# Patient Record
Sex: MALE | Race: WHITE | NOT HISPANIC OR LATINO | Employment: OTHER | URBAN - METROPOLITAN AREA
[De-identification: names, ages, dates, MRNs, and addresses within clinical notes are randomized per-mention and may not be internally consistent; named-entity substitution may affect disease eponyms.]

---

## 2022-02-04 ENCOUNTER — APPOINTMENT (OUTPATIENT)
Dept: CT IMAGING | Facility: CLINIC | Age: 87
End: 2022-02-04
Attending: EMERGENCY MEDICINE
Payer: COMMERCIAL

## 2022-02-04 ENCOUNTER — APPOINTMENT (OUTPATIENT)
Dept: MRI IMAGING | Facility: CLINIC | Age: 87
End: 2022-02-04
Attending: EMERGENCY MEDICINE
Payer: COMMERCIAL

## 2022-02-04 ENCOUNTER — HOSPITAL ENCOUNTER (EMERGENCY)
Facility: CLINIC | Age: 87
Discharge: HOME | End: 2022-02-04
Attending: EMERGENCY MEDICINE | Admitting: EMERGENCY MEDICINE
Payer: COMMERCIAL

## 2022-02-04 VITALS
SYSTOLIC BLOOD PRESSURE: 147 MMHG | BODY MASS INDEX: 19.05 KG/M2 | HEART RATE: 68 BPM | RESPIRATION RATE: 12 BRPM | TEMPERATURE: 97.8 F | WEIGHT: 140.65 LBS | OXYGEN SATURATION: 99 % | HEIGHT: 72 IN | DIASTOLIC BLOOD PRESSURE: 81 MMHG

## 2022-02-04 DIAGNOSIS — R42 VERTIGO: ICD-10-CM

## 2022-02-04 LAB
ANION GAP SERPL CALCULATED.3IONS-SCNC: 5 MMOL/L (ref 3–14)
ATRIAL RATE - MUSE: 74 BPM
BASOPHILS # BLD AUTO: 0 10E3/UL (ref 0–0.2)
BASOPHILS NFR BLD AUTO: 1 %
BUN SERPL-MCNC: 17 MG/DL (ref 7–30)
CALCIUM SERPL-MCNC: 8.4 MG/DL (ref 8.5–10.1)
CHLORIDE BLD-SCNC: 104 MMOL/L (ref 94–109)
CO2 SERPL-SCNC: 28 MMOL/L (ref 20–32)
CREAT BLD-MCNC: 0.8 MG/DL (ref 0.7–1.3)
CREAT SERPL-MCNC: 0.84 MG/DL (ref 0.66–1.25)
DIASTOLIC BLOOD PRESSURE - MUSE: NORMAL MMHG
EOSINOPHIL # BLD AUTO: 0 10E3/UL (ref 0–0.7)
EOSINOPHIL NFR BLD AUTO: 0 %
ERYTHROCYTE [DISTWIDTH] IN BLOOD BY AUTOMATED COUNT: 15.9 % (ref 10–15)
GFR SERPL CREATININE-BSD FRML MDRD: 85 ML/MIN/1.73M2
GFR SERPL CREATININE-BSD FRML MDRD: >60 ML/MIN/1.73M2
GLUCOSE BLD-MCNC: 123 MG/DL (ref 70–99)
GLUCOSE BLDC GLUCOMTR-MCNC: 115 MG/DL (ref 70–99)
HCT VFR BLD AUTO: 43.2 % (ref 40–53)
HGB BLD-MCNC: 13.6 G/DL (ref 13.3–17.7)
HOLD SPECIMEN: NORMAL
IMM GRANULOCYTES # BLD: 0 10E3/UL
IMM GRANULOCYTES NFR BLD: 0 %
INR PPP: 2.23 (ref 0.85–1.15)
INTERPRETATION ECG - MUSE: NORMAL
LYMPHOCYTES # BLD AUTO: 0.8 10E3/UL (ref 0.8–5.3)
LYMPHOCYTES NFR BLD AUTO: 14 %
MCH RBC QN AUTO: 29.6 PG (ref 26.5–33)
MCHC RBC AUTO-ENTMCNC: 31.5 G/DL (ref 31.5–36.5)
MCV RBC AUTO: 94 FL (ref 78–100)
MONOCYTES # BLD AUTO: 0.6 10E3/UL (ref 0–1.3)
MONOCYTES NFR BLD AUTO: 10 %
NEUTROPHILS # BLD AUTO: 4.4 10E3/UL (ref 1.6–8.3)
NEUTROPHILS NFR BLD AUTO: 75 %
NRBC # BLD AUTO: 0 10E3/UL
NRBC BLD AUTO-RTO: 0 /100
P AXIS - MUSE: 46 DEGREES
PLATELET # BLD AUTO: 256 10E3/UL (ref 150–450)
POTASSIUM BLD-SCNC: 4.4 MMOL/L (ref 3.4–5.3)
PR INTERVAL - MUSE: 242 MS
QRS DURATION - MUSE: 160 MS
QT - MUSE: 448 MS
QTC - MUSE: 497 MS
R AXIS - MUSE: -43 DEGREES
RBC # BLD AUTO: 4.6 10E6/UL (ref 4.4–5.9)
SODIUM SERPL-SCNC: 137 MMOL/L (ref 133–144)
SYSTOLIC BLOOD PRESSURE - MUSE: NORMAL MMHG
T AXIS - MUSE: 97 DEGREES
TROPONIN I SERPL HS-MCNC: 10 NG/L
VENTRICULAR RATE- MUSE: 74 BPM
WBC # BLD AUTO: 5.8 10E3/UL (ref 4–11)

## 2022-02-04 PROCEDURE — 70450 CT HEAD/BRAIN W/O DYE: CPT

## 2022-02-04 PROCEDURE — 36415 COLL VENOUS BLD VENIPUNCTURE: CPT | Performed by: EMERGENCY MEDICINE

## 2022-02-04 PROCEDURE — 85025 COMPLETE CBC W/AUTO DIFF WBC: CPT | Performed by: EMERGENCY MEDICINE

## 2022-02-04 PROCEDURE — 250N000011 HC RX IP 250 OP 636: Performed by: EMERGENCY MEDICINE

## 2022-02-04 PROCEDURE — 84484 ASSAY OF TROPONIN QUANT: CPT | Performed by: EMERGENCY MEDICINE

## 2022-02-04 PROCEDURE — 93005 ELECTROCARDIOGRAM TRACING: CPT

## 2022-02-04 PROCEDURE — 250N000013 HC RX MED GY IP 250 OP 250 PS 637: Performed by: EMERGENCY MEDICINE

## 2022-02-04 PROCEDURE — 85610 PROTHROMBIN TIME: CPT | Performed by: EMERGENCY MEDICINE

## 2022-02-04 PROCEDURE — 82310 ASSAY OF CALCIUM: CPT | Performed by: EMERGENCY MEDICINE

## 2022-02-04 PROCEDURE — 99285 EMERGENCY DEPT VISIT HI MDM: CPT | Mod: 25

## 2022-02-04 PROCEDURE — 70551 MRI BRAIN STEM W/O DYE: CPT

## 2022-02-04 PROCEDURE — 70496 CT ANGIOGRAPHY HEAD: CPT

## 2022-02-04 PROCEDURE — 82565 ASSAY OF CREATININE: CPT | Mod: 91

## 2022-02-04 PROCEDURE — 70498 CT ANGIOGRAPHY NECK: CPT

## 2022-02-04 RX ORDER — MECLIZINE HCL 12.5 MG 12.5 MG/1
12.5 TABLET ORAL 4 TIMES DAILY PRN
Qty: 10 TABLET | Refills: 0 | Status: ON HOLD | OUTPATIENT
Start: 2022-02-04 | End: 2024-01-01

## 2022-02-04 RX ORDER — IOPAMIDOL 755 MG/ML
70 INJECTION, SOLUTION INTRAVASCULAR ONCE
Status: COMPLETED | OUTPATIENT
Start: 2022-02-04 | End: 2022-02-04

## 2022-02-04 RX ORDER — MECLIZINE HCL 12.5 MG 12.5 MG/1
12.5 TABLET ORAL ONCE
Status: COMPLETED | OUTPATIENT
Start: 2022-02-04 | End: 2022-02-04

## 2022-02-04 RX ADMIN — MECLIZINE 12.5 MG: 12.5 TABLET ORAL at 12:39

## 2022-02-04 RX ADMIN — IOPAMIDOL 70 ML: 755 INJECTION, SOLUTION INTRAVENOUS at 10:13

## 2022-02-04 ASSESSMENT — ENCOUNTER SYMPTOMS
WEAKNESS: 0
MYALGIAS: 1
SPEECH DIFFICULTY: 0
LIGHT-HEADEDNESS: 1
CHEST TIGHTNESS: 0
FACIAL ASYMMETRY: 0
DIZZINESS: 1

## 2022-02-04 ASSESSMENT — MIFFLIN-ST. JEOR: SCORE: 1356

## 2022-02-04 NOTE — CONSULTS
"    [facility]    Stroke Telephone Note    I was called by  on 02/04/22 regarding patient Salvador Costello. The patient is a 86 year old male with history of valve replacement (on Coumadin). He presented for evaluation of 12 hours of room-spinning dizziness and lightheadedness. This is associated with blurred vision, both of which are improving in the ED. No reports of focal neurologic deficits.     Imaging Findings   CTH negative for acute pathology  CTA head/neck without significant stenosis or LVO  MRI brain pending    Impression  Dizziness without focal neurologic deficits, MRI brain pending for further evaluation of central vs peripheral etiology.    Recommendations   - MRI brain    My recommendations are based on the information provided over the phone by Salvador Costello's in-person providers. They are not intended to replace the clinical judgment of his in-person providers. I was not requested to personally see or examine the patient at this time.    ERLINDA Hobbs, CNP  Neurology  To page me or covering stroke neurology team member, click here: AMCOM   Choose \"On Call\" tab at top, then search dropdown box for \"Neurology Adult\", select location, press Enter, then look for stroke/neuro ICU/telestroke.           "

## 2022-02-04 NOTE — ED TRIAGE NOTES
Around 10 pm last night patient reports having a onset of dizziness, unsteady gait and vision changes (unable to read and some phototopia). Weakness still persists, and some left upper arm pain. ABCs intact

## 2022-02-04 NOTE — DISCHARGE INSTRUCTIONS
It is safe for you to continue taking your medications as previously prescribed, you can use the Antivert as needed for mild dizziness.  Should you develop other symptoms with it like vision changes that persist, weakness numbness or trouble walking, you should return to the emergency department.

## 2022-02-04 NOTE — ED PROVIDER NOTES
History   Chief Complaint:  Dizziness     The history is provided by the patient.      Salvador Costello is a 86 year old male on Coumadin who presents with dizziness. Patient presents with concern for symptoms of a stroke. He notes last night he became dizzy. He reports both room-spinning and lightheadedness. He also complains of blurred vision at that time. He notes right arm pain but denies any weakness. Here in the Here in the ED he is not dizzy. His right arm pain is gone as well. His blurred vision is improved this morning. He was able to walk this morning but had some balance issues. Denies weakness. No facial asymmetry. No chest pain, chest pressure, or chest heaviness. No speech problems    Review of Systems   Eyes: Positive for visual disturbance.   Respiratory: Negative for chest tightness.    Cardiovascular: Negative for chest pain.   Musculoskeletal: Positive for myalgias (right arm).   Neurological: Positive for dizziness and light-headedness. Negative for facial asymmetry, speech difficulty and weakness.   All other systems reviewed and are negative.    Allergies:  The patient has no known allergies.     Medications:  Coumadin   Lipitor     Past Medical History:     Aortic valve disorder   Mitral valve disorder   Malignant neoplasm of rectosigmoid junction   Osteoporosis   Sleep apnea   Inguinal hernia     Past Surgical History:    Two valve replacements   Colonoscopy   Colon resection   Angiocardiogram      Family History:    Father: stomach cancer  Mother: heart disease   Brother: emphysema     Social History:  Presents to ED alone     Physical Exam     Patient Vitals for the past 24 hrs:   BP Temp Temp src Pulse Resp SpO2 Height Weight   02/04/22 1045 (!) 147/81 -- -- 68 12 99 % -- --   02/04/22 1000 (!) 152/81 -- -- 69 11 100 % -- --   02/04/22 0954 (!) 164/83 97.8  F (36.6  C) Oral 70 12 100 % 1.829 m (6') 63.8 kg (140 lb 10.5 oz)       Physical Exam  Constitutional: Alert, attentive, GCS  15  HENT:    Nose: Nose normal.    Mouth/Throat: Oropharynx is clear, mucous membranes are moist  Eyes: EOM are normal, anicteric, conjugate gaze  CV: regular rate and rhythm; no murmurs  Chest: Effort normal and breath sounds clear without wheezing or rales, symmetric bilaterally   GI:  non tender. No distension. No guarding or rebound.    MSK: No LE edema, no tenderness to palpation of BLE.  Neurological: GCS 15; A/Ox3; Cranial nerves 2-12 intact;   5/5 strength throughout the upper and lower extremities;   sensation intact to light touch throughout the upper and lower extremities;   2+ DTRs to the bilateral upper and lower extremities (biceps, BRs, patellar, achilles);   normal fine motor coordination intact bilaterally;   normal gait   No meningismus   Skin: Skin is warm and dry.    Emergency Department Course   ECG  ECG obtained at 0944, ECG read at 0944  Sinus rhythm with 1st degree AV block with frequent premature ventricular complexes   Possible Left atrial enlargement   Left axis deviation   Left bundle branch block   Abnormal ECG    No significant change as compared to prior, dated 9/30/12.  Rate 74 bpm. TN interval 242 ms. QRS duration 160 ms. QT/QTc 448/497 ms. P-R-T axes 46 -43 97.     Imaging:  MR Brain w/o Contrast   Final Result   IMPRESSION:    1.  No acute intracranial finding. No evidence for recent ischemia,   intracranial hemorrhage, or mass.      CARL TREVIÑO MD            SYSTEM ID:  [id number]      CTA Head Neck with Contrast   Final Result   IMPRESSION:   HEAD CTA:   1.  Minor intracranial atherosclerosis. No vessel stenosis, occlusion   or aneurysm.      NECK CTA:   1.  Minor carotid artery atherosclerosis. No dissection or   hemodynamically significant narrowing in the neck by NASCET criteria.      CARL TREVIÑO MD            SYSTEM ID:  [id number]      Head CT w/o contrast   Final Result   IMPRESSION:   No intracranial hemorrhage, mass, or definite CT evidence of recent    ischemia.      CARL TREVIÑO MD            SYSTEM ID:  [id number]      Report per radiology    Laboratory:  Labs Ordered and Resulted from Time of ED Arrival to Time of ED Departure   BASIC METABOLIC PANEL - Abnormal       Result Value    Sodium 137      Potassium 4.4      Chloride 104      Carbon Dioxide (CO2) 28      Anion Gap 5      Urea Nitrogen 17      Creatinine 0.84      Calcium 8.4 (*)     Glucose 123 (*)     GFR Estimate 85     CBC WITH PLATELETS AND DIFFERENTIAL - Abnormal    WBC Count 5.8      RBC Count 4.60      Hemoglobin 13.6      Hematocrit 43.2      MCV 94      MCH 29.6      MCHC 31.5      RDW 15.9 (*)     Platelet Count 256      % Neutrophils 75      % Lymphocytes 14      % Monocytes 10      % Eosinophils 0      % Basophils 1      % Immature Granulocytes 0      NRBCs per 100 WBC 0      Absolute Neutrophils 4.4      Absolute Lymphocytes 0.8      Absolute Monocytes 0.6      Absolute Eosinophils 0.0      Absolute Basophils 0.0      Absolute Immature Granulocytes 0.0      Absolute NRBCs 0.0     GLUCOSE BY METER - Abnormal    GLUCOSE BY METER POCT 115 (*)    INR - Abnormal    INR 2.23 (*)    TROPONIN I - Normal    Troponin I High Sensitivity 10     ISTAT CREATININE POCT - Normal    Creatinine POCT 0.8      GFR, ESTIMATED POCT >60          Emergency Department Course:     Reviewed:  I reviewed nursing notes, vitals, past medical history and Care Everywhere    Assessments:  0946 I obtained history and examined the patient as noted above.   1115 I rechecked the patient and explained findings.     Consults:  1003 I spoke with Brittney Wallace CNP from Stroke Neuro regarding patient's presentation, findings, and plan of care.     Interventions:  1239 Antivert, 12.5 mg, PO     Disposition:  The patient was discharged to home.     Impression & Plan   Medical Decision Makin-year-old male past medical history significant for aortic valve and mitral valve replacement, coronary artery disease, on Coumadin  presenting for evaluation of vertigo with difficulty walking that began last night.  He does report a history of vertigo though remotely.  He denies any numbness or weakness but did have some pain in his right arm and a little bit of blurry vision at the time.  CTA head and neck were obtained mainly due to his anticoagulated status to assess for possible intracranial hemorrhage and these were fortunately negative except for mild atherosclerotic disease.  Given his symptom, constellation certainly concern was for possible posterior circulation stroke though likely subacute however MRI here was negative.  With meclizine he was able to ambulate with a steady gait, and given the negative above work-up, I suspect likely peripheral etiology.  He does not have any aural fullness or tinnitus, given his history of vertigo I suspect BPPV.  I recommended met Antivert, close PCP follow-up and strict return precautions.  He was discharged home.    Diagnosis:    ICD-10-CM    1. Vertigo  R42        Discharge Medications:  New Prescriptions    MECLIZINE (ANTIVERT) 12.5 MG TABLET    Take 1 tablet (12.5 mg) by mouth 4 times daily as needed for dizziness     Audi Mckeon MD  [employer]  1:00 PM 02/04/22     Scribe Disclosure:  DANITA, Dhaval Jc, am serving as a scribe at 9:45 AM on 2/4/2022 to document services personally performed by Adui Mckeon MD based on my observations and the provider's statements to me.            Audi Mckeon MD  02/04/22 1300

## 2023-01-01 ENCOUNTER — HOSPITAL ENCOUNTER (OUTPATIENT)
Dept: CARDIOLOGY | Facility: CLINIC | Age: 88
Discharge: HOME | End: 2023-10-04
Attending: EMERGENCY MEDICINE | Admitting: EMERGENCY MEDICINE
Payer: COMMERCIAL

## 2023-01-01 ENCOUNTER — TELEPHONE (OUTPATIENT)
Dept: CARDIOLOGY | Facility: CLINIC | Age: 88
End: 2023-01-01
Payer: COMMERCIAL

## 2023-01-01 ENCOUNTER — ANCILLARY PROCEDURE (OUTPATIENT)
Dept: CARDIOLOGY | Facility: CLINIC | Age: 88
End: 2023-01-01
Attending: EMERGENCY MEDICINE
Payer: COMMERCIAL

## 2023-01-01 ENCOUNTER — APPOINTMENT (OUTPATIENT)
Dept: CT IMAGING | Facility: CLINIC | Age: 88
End: 2023-01-01
Attending: EMERGENCY MEDICINE
Payer: COMMERCIAL

## 2023-01-01 ENCOUNTER — ANCILLARY PROCEDURE (OUTPATIENT)
Dept: CARDIOLOGY | Facility: CLINIC | Age: 88
End: 2023-01-01
Attending: INTERNAL MEDICINE
Payer: COMMERCIAL

## 2023-01-01 ENCOUNTER — TELEPHONE (OUTPATIENT)
Dept: NEUROSURGERY | Facility: CLINIC | Age: 88
End: 2023-01-01
Payer: COMMERCIAL

## 2023-01-01 ENCOUNTER — ANCILLARY PROCEDURE (OUTPATIENT)
Dept: GENERAL RADIOLOGY | Facility: CLINIC | Age: 88
End: 2023-01-01
Attending: PHYSICIAN ASSISTANT
Payer: COMMERCIAL

## 2023-01-01 ENCOUNTER — OFFICE VISIT (OUTPATIENT)
Dept: NEUROSURGERY | Facility: CLINIC | Age: 88
End: 2023-01-01
Attending: PHYSICIAN ASSISTANT
Payer: COMMERCIAL

## 2023-01-01 ENCOUNTER — APPOINTMENT (OUTPATIENT)
Dept: GENERAL RADIOLOGY | Facility: CLINIC | Age: 88
End: 2023-01-01
Attending: INTERNAL MEDICINE
Payer: COMMERCIAL

## 2023-01-01 ENCOUNTER — HEALTH MAINTENANCE LETTER (OUTPATIENT)
Age: 88
End: 2023-01-01

## 2023-01-01 ENCOUNTER — TELEPHONE (OUTPATIENT)
Dept: NEUROSURGERY | Facility: CLINIC | Age: 88
End: 2023-01-01

## 2023-01-01 ENCOUNTER — APPOINTMENT (OUTPATIENT)
Dept: GENERAL RADIOLOGY | Facility: CLINIC | Age: 88
End: 2023-01-01
Attending: EMERGENCY MEDICINE
Payer: COMMERCIAL

## 2023-01-01 ENCOUNTER — HOSPITAL ENCOUNTER (OUTPATIENT)
Facility: CLINIC | Age: 88
Discharge: HOME | End: 2023-10-27
Admitting: INTERNAL MEDICINE
Payer: COMMERCIAL

## 2023-01-01 ENCOUNTER — HOSPITAL ENCOUNTER (EMERGENCY)
Facility: CLINIC | Age: 88
Discharge: HOME | End: 2023-09-29
Attending: EMERGENCY MEDICINE | Admitting: EMERGENCY MEDICINE
Payer: COMMERCIAL

## 2023-01-01 ENCOUNTER — DOCUMENTATION ONLY (OUTPATIENT)
Dept: OTHER | Facility: CLINIC | Age: 88
End: 2023-01-01
Payer: COMMERCIAL

## 2023-01-01 ENCOUNTER — TELEPHONE (OUTPATIENT)
Dept: CARDIOLOGY | Facility: CLINIC | Age: 88
End: 2023-01-01

## 2023-01-01 VITALS
HEIGHT: 72 IN | HEART RATE: 80 BPM | DIASTOLIC BLOOD PRESSURE: 84 MMHG | TEMPERATURE: 97.8 F | OXYGEN SATURATION: 96 % | RESPIRATION RATE: 18 BRPM | BODY MASS INDEX: 19.08 KG/M2 | SYSTOLIC BLOOD PRESSURE: 133 MMHG

## 2023-01-01 VITALS
SYSTOLIC BLOOD PRESSURE: 135 MMHG | WEIGHT: 131 LBS | TEMPERATURE: 97.3 F | BODY MASS INDEX: 17.74 KG/M2 | RESPIRATION RATE: 16 BRPM | HEIGHT: 72 IN | DIASTOLIC BLOOD PRESSURE: 71 MMHG | HEART RATE: 64 BPM | OXYGEN SATURATION: 96 %

## 2023-01-01 VITALS — SYSTOLIC BLOOD PRESSURE: 132 MMHG | OXYGEN SATURATION: 97 % | HEART RATE: 74 BPM | DIASTOLIC BLOOD PRESSURE: 80 MMHG

## 2023-01-01 DIAGNOSIS — S22.080A T12 COMPRESSION FRACTURE, INITIAL ENCOUNTER (H): ICD-10-CM

## 2023-01-01 DIAGNOSIS — R55 SYNCOPE, UNSPECIFIED SYNCOPE TYPE: Primary | ICD-10-CM

## 2023-01-01 DIAGNOSIS — Z95.0 CARDIAC PACEMAKER IN SITU: ICD-10-CM

## 2023-01-01 DIAGNOSIS — R55 SYNCOPE, UNSPECIFIED SYNCOPE TYPE: ICD-10-CM

## 2023-01-01 DIAGNOSIS — S22.080A CLOSED WEDGE COMPRESSION FRACTURE OF T12 VERTEBRA, INITIAL ENCOUNTER (H): Primary | ICD-10-CM

## 2023-01-01 DIAGNOSIS — I44.2 COMPLETE HEART BLOCK (H): Primary | ICD-10-CM

## 2023-01-01 DIAGNOSIS — I44.2 COMPLETE HEART BLOCK (H): ICD-10-CM

## 2023-01-01 DIAGNOSIS — S22.009A THORACIC SPINE FRACTURE (H): Primary | ICD-10-CM

## 2023-01-01 DIAGNOSIS — Z95.0 CARDIAC PACEMAKER IN SITU: Primary | ICD-10-CM

## 2023-01-01 DIAGNOSIS — S22.009A THORACIC SPINE FRACTURE (H): ICD-10-CM

## 2023-01-01 LAB
ANION GAP SERPL CALCULATED.3IONS-SCNC: 10 MMOL/L (ref 7–15)
ANION GAP SERPL CALCULATED.3IONS-SCNC: 8 MMOL/L (ref 7–15)
ATRIAL RATE - MUSE: 71 BPM
BASOPHILS # BLD AUTO: 0 10E3/UL (ref 0–0.2)
BASOPHILS NFR BLD AUTO: 0 %
BUN SERPL-MCNC: 19.8 MG/DL (ref 8–23)
BUN SERPL-MCNC: 25.5 MG/DL (ref 8–23)
CALCIUM SERPL-MCNC: 9 MG/DL (ref 8.8–10.2)
CALCIUM SERPL-MCNC: 9.4 MG/DL (ref 8.8–10.2)
CHLORIDE SERPL-SCNC: 100 MMOL/L (ref 98–107)
CHLORIDE SERPL-SCNC: 104 MMOL/L (ref 98–107)
CREAT SERPL-MCNC: 0.79 MG/DL (ref 0.67–1.17)
CREAT SERPL-MCNC: 0.93 MG/DL (ref 0.67–1.17)
DEPRECATED HCO3 PLAS-SCNC: 25 MMOL/L (ref 22–29)
DEPRECATED HCO3 PLAS-SCNC: 29 MMOL/L (ref 22–29)
DIASTOLIC BLOOD PRESSURE - MUSE: NORMAL MMHG
EGFRCR SERPLBLD CKD-EPI 2021: 79 ML/MIN/1.73M2
EGFRCR SERPLBLD CKD-EPI 2021: 85 ML/MIN/1.73M2
EOSINOPHIL # BLD AUTO: 0 10E3/UL (ref 0–0.7)
EOSINOPHIL NFR BLD AUTO: 0 %
ERYTHROCYTE [DISTWIDTH] IN BLOOD BY AUTOMATED COUNT: 15.3 % (ref 10–15)
ERYTHROCYTE [DISTWIDTH] IN BLOOD BY AUTOMATED COUNT: 15.7 % (ref 10–15)
GLUCOSE SERPL-MCNC: 96 MG/DL (ref 70–99)
GLUCOSE SERPL-MCNC: 97 MG/DL (ref 70–99)
HCT VFR BLD AUTO: 42 % (ref 40–53)
HCT VFR BLD AUTO: 44.8 % (ref 40–53)
HGB BLD-MCNC: 13.4 G/DL (ref 13.3–17.7)
HGB BLD-MCNC: 14.8 G/DL (ref 13.3–17.7)
IMM GRANULOCYTES # BLD: 0.1 10E3/UL
IMM GRANULOCYTES NFR BLD: 1 %
INR BLD: 2.2 (ref 0.9–1.1)
INR PPP: 2.1 (ref 0.85–1.15)
INTERPRETATION ECG - MUSE: NORMAL
LVEF ECHO: NORMAL
LYMPHOCYTES # BLD AUTO: 0.6 10E3/UL (ref 0.8–5.3)
LYMPHOCYTES NFR BLD AUTO: 5 %
MAGNESIUM SERPL-MCNC: 2.1 MG/DL (ref 1.7–2.3)
MCH RBC QN AUTO: 29.6 PG (ref 26.5–33)
MCH RBC QN AUTO: 30.3 PG (ref 26.5–33)
MCHC RBC AUTO-ENTMCNC: 31.9 G/DL (ref 31.5–36.5)
MCHC RBC AUTO-ENTMCNC: 33 G/DL (ref 31.5–36.5)
MCV RBC AUTO: 92 FL (ref 78–100)
MCV RBC AUTO: 93 FL (ref 78–100)
MDC_IDC_LEAD_CONNECTION_STATUS: NORMAL
MDC_IDC_LEAD_CONNECTION_STATUS: NORMAL
MDC_IDC_LEAD_IMPLANT_DT: NORMAL
MDC_IDC_LEAD_IMPLANT_DT: NORMAL
MDC_IDC_LEAD_LOCATION: NORMAL
MDC_IDC_LEAD_LOCATION: NORMAL
MDC_IDC_LEAD_LOCATION_DETAIL_1: NORMAL
MDC_IDC_LEAD_LOCATION_DETAIL_1: NORMAL
MDC_IDC_LEAD_MFG: NORMAL
MDC_IDC_LEAD_MFG: NORMAL
MDC_IDC_LEAD_MODEL: NORMAL
MDC_IDC_LEAD_MODEL: NORMAL
MDC_IDC_LEAD_POLARITY_TYPE: NORMAL
MDC_IDC_LEAD_POLARITY_TYPE: NORMAL
MDC_IDC_LEAD_SERIAL: NORMAL
MDC_IDC_LEAD_SERIAL: NORMAL
MDC_IDC_MSMT_BATTERY_STATUS: NORMAL
MDC_IDC_MSMT_LEADCHNL_RA_IMPEDANCE_VALUE: 468 OHM
MDC_IDC_MSMT_LEADCHNL_RA_PACING_THRESHOLD_AMPLITUDE: 0.8 V
MDC_IDC_MSMT_LEADCHNL_RA_PACING_THRESHOLD_AMPLITUDE: 1 V
MDC_IDC_MSMT_LEADCHNL_RA_PACING_THRESHOLD_AMPLITUDE: 1 V
MDC_IDC_MSMT_LEADCHNL_RA_PACING_THRESHOLD_PULSEWIDTH: 0.4 MS
MDC_IDC_MSMT_LEADCHNL_RA_SENSING_INTR_AMPL: 2.7 MV
MDC_IDC_MSMT_LEADCHNL_RA_SENSING_INTR_AMPL: 2.7 MV
MDC_IDC_MSMT_LEADCHNL_RV_IMPEDANCE_VALUE: 565 OHM
MDC_IDC_MSMT_LEADCHNL_RV_PACING_THRESHOLD_AMPLITUDE: 1 V
MDC_IDC_MSMT_LEADCHNL_RV_PACING_THRESHOLD_AMPLITUDE: 1 V
MDC_IDC_MSMT_LEADCHNL_RV_PACING_THRESHOLD_PULSEWIDTH: 0.4 MS
MDC_IDC_MSMT_LEADCHNL_RV_PACING_THRESHOLD_PULSEWIDTH: 0.4 MS
MDC_IDC_MSMT_LEADCHNL_RV_SENSING_INTR_AMPL: 11.4 MV
MDC_IDC_MSMT_LEADCHNL_RV_SENSING_INTR_AMPL: 12 MV
MDC_IDC_PG_IMPLANT_DTM: NORMAL
MDC_IDC_PG_MFG: NORMAL
MDC_IDC_PG_MODEL: NORMAL
MDC_IDC_PG_SERIAL: NORMAL
MDC_IDC_PG_TYPE: NORMAL
MDC_IDC_SESS_CLINIC_NAME: NORMAL
MDC_IDC_SESS_DTM: NORMAL
MDC_IDC_SESS_REPROGRAMMED: NORMAL
MDC_IDC_SESS_TYPE: NORMAL
MDC_IDC_SET_BRADY_AT_MODE_SWITCH_MODE: NORMAL
MDC_IDC_SET_BRADY_AT_MODE_SWITCH_RATE: 160 {BEATS}/MIN
MDC_IDC_SET_BRADY_HYSTRATE: 60 {BEATS}/MIN
MDC_IDC_SET_BRADY_LOWRATE: 60 {BEATS}/MIN
MDC_IDC_SET_BRADY_MAX_SENSOR_RATE: 120 {BEATS}/MIN
MDC_IDC_SET_BRADY_MAX_TRACKING_RATE: 130 {BEATS}/MIN
MDC_IDC_SET_BRADY_MODE: NORMAL
MDC_IDC_SET_BRADY_NIGHT_RATE: 60 {BEATS}/MIN
MDC_IDC_SET_BRADY_PAV_DELAY_HIGH: 190 MS
MDC_IDC_SET_BRADY_PAV_DELAY_LOW: 230 MS
MDC_IDC_SET_BRADY_SAV_DELAY_HIGH: 160 MS
MDC_IDC_SET_BRADY_SAV_DELAY_LOW: 200 MS
MDC_IDC_SET_CRT_PACED_CHAMBERS: NORMAL
MDC_IDC_SET_LEADCHNL_LV_PACING_CATHODE_ELECTRODE_1: NORMAL
MDC_IDC_SET_LEADCHNL_LV_PACING_CATHODE_LOCATION_1: NORMAL
MDC_IDC_SET_LEADCHNL_LV_PACING_POLARITY: NORMAL
MDC_IDC_SET_LEADCHNL_LV_SENSING_CATHODE_ELECTRODE_1: NORMAL
MDC_IDC_SET_LEADCHNL_LV_SENSING_CATHODE_LOCATION_1: NORMAL
MDC_IDC_SET_LEADCHNL_LV_SENSING_POLARITY: NORMAL
MDC_IDC_SET_LEADCHNL_RA_PACING_AMPLITUDE: 1.8 V
MDC_IDC_SET_LEADCHNL_RA_PACING_POLARITY: NORMAL
MDC_IDC_SET_LEADCHNL_RA_PACING_PULSEWIDTH: 0.4 MS
MDC_IDC_SET_LEADCHNL_RA_SENSING_ADAPTATION_MODE: NORMAL
MDC_IDC_SET_LEADCHNL_RA_SENSING_POLARITY: NORMAL
MDC_IDC_SET_LEADCHNL_RA_SENSING_SENSITIVITY: NORMAL
MDC_IDC_SET_LEADCHNL_RV_PACING_AMPLITUDE: 1.5 V
MDC_IDC_SET_LEADCHNL_RV_PACING_POLARITY: NORMAL
MDC_IDC_SET_LEADCHNL_RV_PACING_PULSEWIDTH: 0.4 MS
MDC_IDC_SET_LEADCHNL_RV_SENSING_ADAPTATION_MODE: NORMAL
MDC_IDC_SET_LEADCHNL_RV_SENSING_POLARITY: NORMAL
MDC_IDC_SET_LEADCHNL_RV_SENSING_SENSITIVITY: NORMAL
MONOCYTES # BLD AUTO: 0.6 10E3/UL (ref 0–1.3)
MONOCYTES NFR BLD AUTO: 5 %
NEUTROPHILS # BLD AUTO: 10.4 10E3/UL (ref 1.6–8.3)
NEUTROPHILS NFR BLD AUTO: 89 %
NRBC # BLD AUTO: 0 10E3/UL
NRBC BLD AUTO-RTO: 0 /100
P AXIS - MUSE: 1 DEGREES
PLATELET # BLD AUTO: 234 10E3/UL (ref 150–450)
PLATELET # BLD AUTO: 261 10E3/UL (ref 150–450)
POTASSIUM SERPL-SCNC: 4.6 MMOL/L (ref 3.4–5.3)
POTASSIUM SERPL-SCNC: 5.3 MMOL/L (ref 3.4–5.3)
POTASSIUM SERPL-SCNC: 6.8 MMOL/L (ref 3.4–5.3)
PR INTERVAL - MUSE: 208 MS
QRS DURATION - MUSE: 148 MS
QT - MUSE: 458 MS
QTC - MUSE: 497 MS
R AXIS - MUSE: 244 DEGREES
RBC # BLD AUTO: 4.53 10E6/UL (ref 4.4–5.9)
RBC # BLD AUTO: 4.89 10E6/UL (ref 4.4–5.9)
SODIUM SERPL-SCNC: 135 MMOL/L (ref 135–145)
SODIUM SERPL-SCNC: 141 MMOL/L (ref 135–145)
SYSTOLIC BLOOD PRESSURE - MUSE: NORMAL MMHG
T AXIS - MUSE: 60 DEGREES
TROPONIN T SERPL HS-MCNC: 19 NG/L
TROPONIN T SERPL HS-MCNC: NORMAL NG/L
VENTRICULAR RATE- MUSE: 71 BPM
WBC # BLD AUTO: 11.8 10E3/UL (ref 4–11)
WBC # BLD AUTO: 6.6 10E3/UL (ref 4–11)

## 2023-01-01 PROCEDURE — 99223 1ST HOSP IP/OBS HIGH 75: CPT | Performed by: NURSE PRACTITIONER

## 2023-01-01 PROCEDURE — 83735 ASSAY OF MAGNESIUM: CPT | Performed by: EMERGENCY MEDICINE

## 2023-01-01 PROCEDURE — 70450 CT HEAD/BRAIN W/O DYE: CPT

## 2023-01-01 PROCEDURE — 84484 ASSAY OF TROPONIN QUANT: CPT | Mod: 91 | Performed by: EMERGENCY MEDICINE

## 2023-01-01 PROCEDURE — 80048 BASIC METABOLIC PNL TOTAL CA: CPT | Performed by: INTERNAL MEDICINE

## 2023-01-01 PROCEDURE — 36415 COLL VENOUS BLD VENIPUNCTURE: CPT | Performed by: INTERNAL MEDICINE

## 2023-01-01 PROCEDURE — 93005 ELECTROCARDIOGRAM TRACING: CPT

## 2023-01-01 PROCEDURE — 99418 PROLNG IP/OBS E/M EA 15 MIN: CPT | Performed by: NURSE PRACTITIONER

## 2023-01-01 PROCEDURE — 272N000001 HC OR GENERAL SUPPLY STERILE: Performed by: INTERNAL MEDICINE

## 2023-01-01 PROCEDURE — C1785 PMKR, DUAL, RATE-RESP: HCPCS | Performed by: INTERNAL MEDICINE

## 2023-01-01 PROCEDURE — 999N000071 HC STATISTIC HEART CATH LAB OR EP LAB

## 2023-01-01 PROCEDURE — 85610 PROTHROMBIN TIME: CPT | Performed by: EMERGENCY MEDICINE

## 2023-01-01 PROCEDURE — 999N000184 HC STATISTIC TELEMETRY

## 2023-01-01 PROCEDURE — 250N000009 HC RX 250: Performed by: INTERNAL MEDICINE

## 2023-01-01 PROCEDURE — 33208 INSRT HEART PM ATRIAL & VENT: CPT | Performed by: INTERNAL MEDICINE

## 2023-01-01 PROCEDURE — 93280 PM DEVICE PROGR EVAL DUAL: CPT | Performed by: INTERNAL MEDICINE

## 2023-01-01 PROCEDURE — 99152 MOD SED SAME PHYS/QHP 5/>YRS: CPT | Performed by: INTERNAL MEDICINE

## 2023-01-01 PROCEDURE — G0463 HOSPITAL OUTPT CLINIC VISIT: HCPCS | Performed by: PHYSICIAN ASSISTANT

## 2023-01-01 PROCEDURE — 36415 COLL VENOUS BLD VENIPUNCTURE: CPT

## 2023-01-01 PROCEDURE — 80048 BASIC METABOLIC PNL TOTAL CA: CPT | Performed by: EMERGENCY MEDICINE

## 2023-01-01 PROCEDURE — 258N000002 HC RX IP 258 OP 250: Performed by: INTERNAL MEDICINE

## 2023-01-01 PROCEDURE — C1898 LEAD, PMKR, OTHER THAN TRANS: HCPCS | Performed by: INTERNAL MEDICINE

## 2023-01-01 PROCEDURE — 33208 INSRT HEART PM ATRIAL & VENT: CPT | Mod: KX | Performed by: INTERNAL MEDICINE

## 2023-01-01 PROCEDURE — C1779 LEAD, PMKR, TRANSVENOUS VDD: HCPCS | Performed by: INTERNAL MEDICINE

## 2023-01-01 PROCEDURE — 85004 AUTOMATED DIFF WBC COUNT: CPT | Performed by: EMERGENCY MEDICINE

## 2023-01-01 PROCEDURE — 72070 X-RAY EXAM THORAC SPINE 2VWS: CPT | Mod: TC | Performed by: RADIOLOGY

## 2023-01-01 PROCEDURE — 99285 EMERGENCY DEPT VISIT HI MDM: CPT | Mod: 25

## 2023-01-01 PROCEDURE — 250N000011 HC RX IP 250 OP 636: Mod: JZ | Performed by: INTERNAL MEDICINE

## 2023-01-01 PROCEDURE — 999N000065 XR CHEST 2 VIEWS

## 2023-01-01 PROCEDURE — 85610 PROTHROMBIN TIME: CPT

## 2023-01-01 PROCEDURE — C1894 INTRO/SHEATH, NON-LASER: HCPCS | Performed by: INTERNAL MEDICINE

## 2023-01-01 PROCEDURE — 99203 OFFICE O/P NEW LOW 30 MIN: CPT | Performed by: PHYSICIAN ASSISTANT

## 2023-01-01 PROCEDURE — 72072 X-RAY EXAM THORAC SPINE 3VWS: CPT

## 2023-01-01 PROCEDURE — 85027 COMPLETE CBC AUTOMATED: CPT | Performed by: INTERNAL MEDICINE

## 2023-01-01 PROCEDURE — 72125 CT NECK SPINE W/O DYE: CPT

## 2023-01-01 PROCEDURE — 250N000013 HC RX MED GY IP 250 OP 250 PS 637: Performed by: EMERGENCY MEDICINE

## 2023-01-01 PROCEDURE — 93306 TTE W/DOPPLER COMPLETE: CPT | Performed by: STUDENT IN AN ORGANIZED HEALTH CARE EDUCATION/TRAINING PROGRAM

## 2023-01-01 PROCEDURE — 93244 EXT ECG>48HR<7D REV&INTERPJ: CPT | Performed by: INTERNAL MEDICINE

## 2023-01-01 PROCEDURE — 93242 EXT ECG>48HR<7D RECORDING: CPT

## 2023-01-01 PROCEDURE — 84132 ASSAY OF SERUM POTASSIUM: CPT | Performed by: EMERGENCY MEDICINE

## 2023-01-01 PROCEDURE — 99153 MOD SED SAME PHYS/QHP EA: CPT | Performed by: INTERNAL MEDICINE

## 2023-01-01 PROCEDURE — 36415 COLL VENOUS BLD VENIPUNCTURE: CPT | Performed by: EMERGENCY MEDICINE

## 2023-01-01 DEVICE — LEAD PACING SOLIA S 53 PROMRI: Type: IMPLANTABLE DEVICE | Status: FUNCTIONAL

## 2023-01-01 DEVICE — LEAD PACING SOLIA S 45 PROMRI: Type: IMPLANTABLE DEVICE | Status: FUNCTIONAL

## 2023-01-01 DEVICE — PACEMAKER PROMRI DUAL CHAMBER: Type: IMPLANTABLE DEVICE | Status: FUNCTIONAL

## 2023-01-01 RX ORDER — FENTANYL CITRATE 50 UG/ML
INJECTION, SOLUTION INTRAMUSCULAR; INTRAVENOUS
Status: DISCONTINUED | OUTPATIENT
Start: 2023-01-01 | End: 2023-01-01 | Stop reason: HOSPADM

## 2023-01-01 RX ORDER — ONDANSETRON 2 MG/ML
4 INJECTION INTRAMUSCULAR; INTRAVENOUS EVERY 6 HOURS PRN
Status: CANCELLED | OUTPATIENT
Start: 2023-01-01

## 2023-01-01 RX ORDER — OXYCODONE AND ACETAMINOPHEN 5; 325 MG/1; MG/1
1 TABLET ORAL EVERY 4 HOURS PRN
Status: DISCONTINUED | OUTPATIENT
Start: 2023-01-01 | End: 2023-01-01 | Stop reason: HOSPADM

## 2023-01-01 RX ORDER — NALOXONE HYDROCHLORIDE 0.4 MG/ML
0.4 INJECTION, SOLUTION INTRAMUSCULAR; INTRAVENOUS; SUBCUTANEOUS
Status: DISCONTINUED | OUTPATIENT
Start: 2023-01-01 | End: 2023-01-01 | Stop reason: HOSPADM

## 2023-01-01 RX ORDER — HYDROMORPHONE HCL IN WATER/PF 6 MG/30 ML
0.4 PATIENT CONTROLLED ANALGESIA SYRINGE INTRAVENOUS
Status: CANCELLED | OUTPATIENT
Start: 2023-01-01

## 2023-01-01 RX ORDER — HYDROMORPHONE HCL IN WATER/PF 6 MG/30 ML
0.2 PATIENT CONTROLLED ANALGESIA SYRINGE INTRAVENOUS
Status: CANCELLED | OUTPATIENT
Start: 2023-01-01

## 2023-01-01 RX ORDER — CEFAZOLIN SODIUM 2 G/100ML
2 INJECTION, SOLUTION INTRAVENOUS
Status: CANCELLED | OUTPATIENT
Start: 2023-01-01

## 2023-01-01 RX ORDER — NALOXONE HYDROCHLORIDE 0.4 MG/ML
0.2 INJECTION, SOLUTION INTRAMUSCULAR; INTRAVENOUS; SUBCUTANEOUS
Status: DISCONTINUED | OUTPATIENT
Start: 2023-01-01 | End: 2023-01-01 | Stop reason: HOSPADM

## 2023-01-01 RX ORDER — OXYCODONE HYDROCHLORIDE 5 MG/1
5 TABLET ORAL EVERY 4 HOURS PRN
Status: CANCELLED | OUTPATIENT
Start: 2023-01-01

## 2023-01-01 RX ORDER — CEFAZOLIN SODIUM 2 G/100ML
INJECTION, SOLUTION INTRAVENOUS CONTINUOUS PRN
Status: COMPLETED | OUTPATIENT
Start: 2023-01-01 | End: 2023-01-01

## 2023-01-01 RX ORDER — ACETAMINOPHEN 325 MG/1
650 TABLET ORAL ONCE
Status: COMPLETED | OUTPATIENT
Start: 2023-01-01 | End: 2023-01-01

## 2023-01-01 RX ORDER — ONDANSETRON 4 MG/1
4 TABLET, ORALLY DISINTEGRATING ORAL EVERY 8 HOURS PRN
Qty: 10 TABLET | Refills: 0 | Status: SHIPPED | OUTPATIENT
Start: 2023-01-01 | End: 2023-01-01

## 2023-01-01 RX ORDER — AMOXICILLIN 250 MG
1 CAPSULE ORAL 2 TIMES DAILY PRN
Status: CANCELLED | OUTPATIENT
Start: 2023-01-01

## 2023-01-01 RX ORDER — ACETAMINOPHEN 325 MG/1
975 TABLET ORAL EVERY 8 HOURS
Status: CANCELLED | OUTPATIENT
Start: 2023-01-01

## 2023-01-01 RX ORDER — LIDOCAINE 40 MG/G
CREAM TOPICAL
Status: DISCONTINUED | OUTPATIENT
Start: 2023-01-01 | End: 2023-01-01 | Stop reason: HOSPADM

## 2023-01-01 RX ORDER — BUPIVACAINE HYDROCHLORIDE 2.5 MG/ML
INJECTION, SOLUTION EPIDURAL; INFILTRATION; INTRACAUDAL
Status: DISCONTINUED | OUTPATIENT
Start: 2023-01-01 | End: 2023-01-01 | Stop reason: HOSPADM

## 2023-01-01 RX ORDER — METHOCARBAMOL 500 MG/1
500 TABLET, FILM COATED ORAL 4 TIMES DAILY PRN
Status: CANCELLED | OUTPATIENT
Start: 2023-01-01

## 2023-01-01 RX ORDER — ONDANSETRON 4 MG/1
4 TABLET, ORALLY DISINTEGRATING ORAL EVERY 6 HOURS PRN
Status: CANCELLED | OUTPATIENT
Start: 2023-01-01

## 2023-01-01 RX ORDER — OXYCODONE HYDROCHLORIDE 5 MG/1
5 TABLET ORAL EVERY 6 HOURS PRN
Qty: 10 TABLET | Refills: 0 | Status: SHIPPED | OUTPATIENT
Start: 2023-01-01 | End: 2023-01-01

## 2023-01-01 RX ORDER — CEFAZOLIN SODIUM 2 G/100ML
2 INJECTION, SOLUTION INTRAVENOUS
Status: DISCONTINUED | OUTPATIENT
Start: 2023-01-01 | End: 2023-01-01 | Stop reason: HOSPADM

## 2023-01-01 RX ORDER — SODIUM CHLORIDE 450 MG/100ML
INJECTION, SOLUTION INTRAVENOUS CONTINUOUS
Status: DISCONTINUED | OUTPATIENT
Start: 2023-01-01 | End: 2023-01-01 | Stop reason: HOSPADM

## 2023-01-01 RX ORDER — PROCHLORPERAZINE MALEATE 5 MG
5 TABLET ORAL EVERY 6 HOURS PRN
Status: CANCELLED | OUTPATIENT
Start: 2023-01-01

## 2023-01-01 RX ORDER — LIDOCAINE 40 MG/G
CREAM TOPICAL
Status: CANCELLED | OUTPATIENT
Start: 2023-01-01

## 2023-01-01 RX ORDER — AMOXICILLIN 250 MG
2 CAPSULE ORAL 2 TIMES DAILY PRN
Status: CANCELLED | OUTPATIENT
Start: 2023-01-01

## 2023-01-01 RX ORDER — PROCHLORPERAZINE 25 MG
12.5 SUPPOSITORY, RECTAL RECTAL EVERY 12 HOURS PRN
Status: CANCELLED | OUTPATIENT
Start: 2023-01-01

## 2023-01-01 RX ORDER — SODIUM CHLORIDE 450 MG/100ML
INJECTION, SOLUTION INTRAVENOUS CONTINUOUS
Status: CANCELLED | OUTPATIENT
Start: 2023-01-01

## 2023-01-01 RX ADMIN — ACETAMINOPHEN 650 MG: 325 TABLET, FILM COATED ORAL at 13:16

## 2023-01-01 RX ADMIN — SODIUM CHLORIDE: 4.5 INJECTION, SOLUTION INTRAVENOUS at 07:05

## 2023-01-01 ASSESSMENT — ACTIVITIES OF DAILY LIVING (ADL)
ADLS_ACUITY_SCORE: 35

## 2023-01-01 ASSESSMENT — PAIN SCALES - GENERAL: PAINLEVEL: NO PAIN (0)

## 2023-09-29 NOTE — ED PROVIDER NOTES
"  History     Chief Complaint:  Fall and Loss of Consciousness       HPI   Salvador Costello is a 88 year old anticoagulated male with a history of osteopenia, aortic valve disorders, mitral valve disorders  and hyperlipidemia who presents to the emergency department after a fall. Patient reports that he was in the kitchen cutting sausages when he turned around to get a bag and then \"came to\" flat on the floor. He was feeling normal when he woke up this morning was not lightheaded or dizzy before the fall. He did not notice any heart fluttering and was not aware of hitting his head. Patient is experiencing right mid back pain. He notes that he had a dizzy episode without syncope in January of this year, had a full work up in the ED, and was diagnosed with vertigo. He had a heart surgery (valve replacement) many years ago which is why he is anticoagulated. He recently had blood work and says that it looked perfect. Patient denies head pain, hip pain, knee pain, and ankle pain.    Independent Historian:    EMS - They report that the patient had a syncopal episode at around 0830 this morning and per patient did not have any dizziness or lightheadedness prior to the fall. Patient was able to get up off the ground on his own but has been feeling shaky since then.   Son - Endorses above history and notes that patient is normally very healthy.     Review of External Notes:  ED provider note 2/4/2022: Dizziness    Allergies:  No Known Allergies     Physical Exam   Patient Vitals for the past 24 hrs:   BP Temp Temp src Pulse Resp SpO2 Height   09/29/23 1318 137/83 -- -- 80 -- 91 % --   09/29/23 1131 -- -- -- 77 -- -- --   09/29/23 1130 (!) 153/96 -- -- 78 -- -- --   09/29/23 1122 -- -- -- 78 -- 98 % --   09/29/23 1059 (!) 161/97 -- -- 72 -- 92 % --   09/29/23 1048 (!) 157/86 97.8  F (36.6  C) Oral 70 18 93 % 1.829 m (6')        Physical Exam    Constitutional: Vital signs reviewed as above.   Head: No obvious external signs of " trauma noted. Head is without raccoon's eyes and without Neri's sign. No external signs of basilar skull fracture. No signs of facial bone instability.  Eyes: Conjunctivae and EOM are normal. Pupils are equal, round, and reactive to light.  ENT:  Nose: No nose lacerations, nasal deformity, septal deviation or nasal septal hematoma. No epistaxis.   Mouth/Throat: Mildly tacky MM  Neck: Normal range of motion and full passive range of motion without pain. Neck supple. No spinous process tenderness present. No tracheal deviation present.   Cardiovascular: Normal rate, regular rhythm, S1 normal, S2 normal and normal pulses.   Pulmonary/Chest: Effort normal and breath sounds normal. No accessory muscle usage. No respiratory distress. Patient has no decreased breath sounds. Patient has no wheezes. Patient has no rhonchi. Patient has no rales. Patient exhibits no bony tenderness and no retraction.   Abdominal: Soft. Normal appearance and bowel sounds are normal. Patient exhibits no distension. There is no tenderness. There is no rebound.   Musculoskeletal/Extremities:        Back:   Cervical back: Normal. No midline TTP.    Thoracic back:  There is mid T-spine midline TTP.   Lumbar back: Normal.  No midline TTP.       Pelvis:   No ASIS tenderness to palpation   No pelvic pain or instability to compression       Extremities:   No deformities or tenderness noted. Normal ROM  Neurological: Patient is alert and oriented to person, place, and time. Patient has normal strength. Patient is not disoriented. No cranial nerve deficit or sensory deficit. GCS eye subscore is 4. GCS verbal subscore is 5. GCS motor subscore is 6.   Skin: Skin is warm, dry and intact.       Emergency Department Course   ECG  ECG results from 09/29/23   EKG 12-lead, tracing only     Value    Systolic Blood Pressure     Diastolic Blood Pressure     Ventricular Rate 71    Atrial Rate 71    GA Interval 208    QRS Duration 148        QTc 497    P Axis  1    R AXIS 244    T Axis 60    Interpretation ECG      Sinus rhythm  Right superior axis deviation  Left bundle branch block  Abnormal ECG  When compared with ECG of 04-FEB-2022 09:44,  Premature ventricular complexes are no longer Present  CA interval has decreased  T wave inversion less evident in Lateral leads  Interpreted by me at 1115         Laboratory: Imaging:   Labs Ordered and Resulted from Time of ED Arrival to Time of ED Departure   BASIC METABOLIC PANEL - Abnormal       Result Value    Sodium 135      Potassium 6.8 (*)     Chloride 100      Carbon Dioxide (CO2) 25      Anion Gap 10      Urea Nitrogen 19.8      Creatinine 0.79      GFR Estimate 85      Calcium 9.0      Glucose 97     CBC WITH PLATELETS AND DIFFERENTIAL - Abnormal    WBC Count 11.8 (*)     RBC Count 4.89      Hemoglobin 14.8      Hematocrit 44.8      MCV 92      MCH 30.3      MCHC 33.0      RDW 15.7 (*)     Platelet Count 234      % Neutrophils 89      % Lymphocytes 5      % Monocytes 5      % Eosinophils 0      % Basophils 0      % Immature Granulocytes 1      NRBCs per 100 WBC 0      Absolute Neutrophils 10.4 (*)     Absolute Lymphocytes 0.6 (*)     Absolute Monocytes 0.6      Absolute Eosinophils 0.0      Absolute Basophils 0.0      Absolute Immature Granulocytes 0.1      Absolute NRBCs 0.0     INR - Abnormal    INR 2.10 (*)    MAGNESIUM - Normal    Magnesium 2.1     POTASSIUM - Normal    Potassium 4.6     TROPONIN T, HIGH SENSITIVITY    Troponin T, High Sensitivity       TROPONIN T, HIGH SENSITIVITY     Thoracic spine XR, 3 views   Final Result   IMPRESSION: T12 compression fracture with moderate to severe height   loss, age indeterminate. More subtle height loss of multiple other   vertebral bodies, age-indeterminate. CT or MRI could be performed.   Multilevel degenerative change.      JESSICA CHAMBERS MD            SYSTEM ID:  [id number]      Cervical spine CT w/o contrast   Final Result   IMPRESSION: No evidence of acute fracture or  subluxation in the   cervical spine.      JESSICA CHAMBERS MD            SYSTEM ID:  [id number]      Head CT w/o contrast   Final Result   IMPRESSION:    No acute intracranial abnormality.      JESSICA CHAMBERS MD            SYSTEM ID:  [id number]      CT Thoracic Spine w/o Contrast    (Results Pending)      Report per radiology     Emergency Department Course & Assessments:         Interventions:  Medications   acetaminophen (TYLENOL) tablet 650 mg (650 mg Oral $Given 9/29/23 1316)        Assessments, Independent Interpretation, Consult/Discussion of ManagementTests:  ED Course as of 09/29/23 1623   Fri Sep 29, 2023   1103 I evaluated the patient.   1308 Rechecked and updated.   1402 D/W Nehemiah Morrell NP accepting for Dr. Way.    1505 Rechecked and updated. The patient wants to DC to home.       Social Determinants of Health affecting care:  None    Disposition:  The patient was discharged to home.     Impression & Plan    CMS Diagnoses: None    Code Status: No Order    Medical Decision Making:    This 88 year old male presents to the ED due to Fall and Loss of Consciousness   . Please see the HPI and exam for specifics. A broad differential was considered including syncope, fracture, intracranial hemorrhage, electrolyte dysfunction, arrhythmia, etc.    Based on the differential, exam, and any decision tools, the above workup was undertaken. Lab and imaging results were reviewed by me and are notable for an initially elevated potassium though on repeat it was normal and this likely reflects a hemolyzed sample.  The patient's troponin is also normal.  He has had no chest pain.  INR is elevated but therapeutic, magnesium is normal, and CBC does not seem concerning.  The patient's CT head and C-spine are reassuring.  The x-ray of his thoracic spine is notable for what is likely an acute T12 compression fracture as this is right over the area that he is feeling pain.    Management of these findings included medications as  above.    The patient's EKG is likely abnormal primarily due to his cardiac surgeries and age.  He is not low risk per the [place] syncope rule and given his event I planned on hospitalization.  The patient declines this and wants to go home.  I will place an order for outpatient echocardiogram and heart monitor, I will encourage primary care follow-up, and I will also encourage the patient to contact the spine surgery clinic regarding his T12 compression fracture.    Medications will be prescribed to his pharmacy of choice and he can always return with new or worsening symptoms.    Critical Care time:  was 0 minutes for this patient excluding procedures.    Diagnosis:    ICD-10-CM    1. Syncope, unspecified syncope type  R55       2. T12 compression fracture, initial encounter (H)  S22.080A            Discharge Medications:  New Prescriptions    No medications on file        Scribe Disclosure:  I, Cadence Chopra, am serving as a scribe at 2:35 PM on 9/29/2023 to document services personally performed by Andrews Lang DO based on my observations and the provider's statements to me.    9/29/2023   Andrews Lang DO Burns, Bradley Joseph, DO  09/29/23 1626

## 2023-09-29 NOTE — ED TRIAGE NOTES
"Patient arrives to [facility] ED via EMS. Patient had a syncopal episode around 0830. Per patient, he did not have any dizziness, lightheadedness, or changes in vision prior to falling and \"coming to\" on the floor.     Patient complains of pain in R mid back where he thinks he fell into his kitchen cabinetry on the way to the floor. Patient states one moment he was in the kitchen and the next he was laying down flat on the floor. Patient was able to get up off the ground on his own. Patient has been feeling \"shaky\" since. Patient denies any head pain. Patient in c-spine precautions due to mechanism of injury.     Patient states he had a similar episode in January of this year and after that work up was diagnosed with vertigo.      Triage Assessment       Row Name 09/29/23 1050       Triage Assessment (Adult)    Airway WDL WDL       Respiratory WDL    Respiratory WDL WDL       Peripheral/Neurovascular WDL    Peripheral Neurovascular WDL WDL                    "

## 2023-09-29 NOTE — DISCHARGE INSTRUCTIONS
What do you do next:   Continue your home medications unless we have specifically changed them  If medications were prescribed today, take these as directed.  You will be contacted about an outpatient ultrasound in your heart and a wearable heart monitor.  I strongly recommend that you follow with your primary care clinic as well.  Follow up as indicated below    When do you return: If you have recurrent fainting, uncontrollable pain, or any other symptoms that concern you, please return to the ED for reevaluation.    Thank you for allowing us to care for you today.

## 2023-09-29 NOTE — H&P
[facility]    History and Physical - Hospitalist Service       Date of Admission:  9/29/2023    Assessment & Plan      Salvador Costello is a 88 year old male with a past medical history of aortic stenosis status post aortic and mitral valve replacement on anticoagulation (warfarin) hyperlipidemia and remote history of colon cancer who presents to [facility] for evaluation of a syncopal event.  He states he was in the kitchen making breakfast this morning when he turned around and to grab something and the next thing he knew he came to laying flat on the floor.  No prodromal symptoms.  He states he has been in his usual state of health and has been feeling normal.  Denies chest pain or shortness of breath.  Denies lightheadedness or dizziness.  No heart palpitations.  He is not aware of hitting his head.  He does endorse right sided mid to upper back pain.  He does endorse that he had a previous episode without syncope in January of this year and had a full work-up in the emergency department and at that time was diagnosed with vertigo.    Collateral information was obtained through EMS and family.  Per paramedics they report that he had a syncopal episode at around 0830 this morning and they state that they were told that he did not have any prodromal symptoms.  He was able to get up off the ground on his own but was noted to be quite shaky.  Patient's son is able to endorses history and adds that his father has been in his usual state of normal health.    Acute medical issues:   #Syncopal event - hx of vertigo.  Previous work up for CVA was negative.    #T12 fracture etiology unclear.    Plan:  -- Admit to Obs   -- Telemetry monitoring overnight.  Serial troponin. Consider event monitor at discharge.   -- Head CT imaging reassuring.  Hold warfarin today (INR goal 2-3).  Consider repeat CT head in the AM pending clinical status.  -- Echo in the AM.  -- Repeat INR, CBC, CMP in the AM.  -- IVF hydration  overnight.  Replace elytes PRN.   -- CT T spine.       Chronic medical issues:  #Hx valvular disease -- aortic and mitral valve replacement on warfarin:  Hold warfarin tonight.  Resume in the AM.         Diet:  Cardiac diet.  IVF hydration. Replete elytes.  DVT Prophylaxis: Hold warfarin tonight. Resume in the AM assuming stability   Palm Catheter: Not present  Lines: None     Cardiac Monitoring: None  Code Status:  Full       Disposition Plan  pending clinical course   9/30/2023.     The patient's care was discussed with the Bedside Nurse, Patient, Patient's Family, and EM Team.    ERLINDA Lutz [facility]  Hospitalist Service  [facility]  Securely message with [employer] (more info)  Text page via [employer] Paging/Directory     Addendum:  9/29/2023 3:46 PM discussed with EM team.  Mr. Costello does not want to stay and his family is in agreement. We recommend overnight observation given unwitnessed fall and unclear etiology/chronicity of T12 fx.  We discussed RBA of staying vs outpatient follow up.  EM team will also discuss and final disposition per EM team.   SF  ______________________________________________________________________    Chief Complaint   Syncope    History is obtained from the patient, electronic health record, and emergency department physician    History of Present Illness   Salvador Costello is a 88 year old male with a past medical history of aortic stenosis status post aortic and mitral valve replacement on anticoagulation (warfarin) hyperlipidemia and remote history of colon cancer who presents to [facility] for evaluation of a syncopal event.  He states he was in the kitchen making breakfast this morning when he turned around and to grab something and the next thing he knew he came to laying flat on the floor.  No prodromal symptoms.  He states he has been in his usual state of health and has been feeling normal.  Denies chest pain or shortness of breath.  Denies lightheadedness or  "dizziness.  No heart palpitations.  He is not aware of hitting his head.  He does endorse right sided mid to upper back pain.  He does endorse that he had a previous episode without syncope in January of this year and had a full work-up in the emergency department and at that time was diagnosed with vertigo.    Collateral information was obtained through EMS and family.  Per paramedics they report that he had a syncopal episode at around 08 30 this morning and they state that they were told that he did not have any prodromal symptoms.  He was able to get up off the ground on his own but was noted to be quite shaky.  Patient's son is able to endorses history and adds that his father has been in his usual state of normal health.    ED course: IV, labs, imaging, treatment  Pertinent findings:  Labs: INR 2.10, potassium of 4.6 (there was an isolated potassium of 6.8 but felt to be hemolyzed), magnesium normal at 2.1, high-sensitivity troponin is currently pending.  Remainder of BMP including renal function is otherwise normal.  CBC with mild leukocytosis at 11.8.  Hemoglobin stable at 14.8.  Platelet count 234,000.  Differential is normal.  Imaging:  CT of the head is negative for hemorrhage, mass, or acute injury.  CT of the cervical spine no evidence of acute fracture or subluxation of the cervical spine.  X-ray of the thoracic spine shows a T12 compression fracture with moderate to severe height loss.  Treatment:  APAP 650 mg PO         Past Medical History    Past Medical History:   Diagnosis Date    Malignant neoplasm (H)     colon (prior to 2004)       Past Surgical History   Past Surgical History:   Procedure Laterality Date    CARDIAC SURGERY  \"[id number]\"    Two valve replacements (aortic and ...)    COLONOSCOPY N/A 10/21/2014    Procedure: COLONOSCOPY;  Surgeon: Brett Reece MD;  Location:  GI    GI SURGERY      colon resection       Prior to Admission Medications   Prior to Admission Medications "   Prescriptions Last Dose Informant Patient Reported? Taking?   Warfarin Sodium (COUMADIN PO)   Yes No   Sig: Take  by mouth.     meclizine (ANTIVERT) 12.5 MG tablet   No No   Sig: Take 1 tablet (12.5 mg) by mouth 4 times daily as needed for dizziness      Facility-Administered Medications: None        Review of Systems    12 point ROS as above otherwise negative.     Social History   I have reviewed this patient's social history and updated it with pertinent information if needed.  Social History     Tobacco Use    Smoking status: Former   Substance Use Topics    Alcohol use: Yes     Alcohol/week: 2.5 standard drinks of alcohol     Types: 3 Cans of beer per week     Comment: a beer a day         Family History     Non contributory to presenting illness.       Allergies   No Known Allergies     Physical Exam   Vital Signs: Temp: 97.8  F (36.6  C) Temp src: Oral BP: 137/83 Pulse: 80   Resp: 18 SpO2: 91 % O2 Device: None (Room air)    Weight: 0 lbs 0 oz    GEN:   Alert, oriented x 3, appears comfortable, NAD.  NECK:   Supple ,no mass or thyromegaly   HEENT:  Normocephalic/atraumatic, no scleral icterus, no nasal discharge, mouth moist.  CV:   Regular rate and rhythm, no murmur or JVD.  S1 + S2 noted, no S3 or S4. +A/M click.   LUNGS:   Clear to auscultation bilaterally without rales/rhonchi/wheezing/retractions.  Symmetric chest rise on inhalation noted.  ABD:   Active bowel sounds, soft, non-tender/non-distended.  No rebound/guarding/rigidity.  EXT:   No edema.  No cyanosis.  No joint synovitis noted.  SKIN:   Dry to touch, no exanthems noted in the visualized areas.  Neurologic: Grossly intact,non focal. Spine: No CTLS tenderness. Bilateral flank tenderness to palpation at L2 but no paraspinal tenderness.    Neuropsychiatric:  General: normal, calm and normal eye contact  Level of consciousness: alert / normal  Affect: normal  Orientation: oriented to self, place, time and situation     Medical Decision Making        90 MINUTES SPENT BY ME on the date of service doing chart review, history, exam, documentation & further activities per the note.      Data   ------------------------- PAST 24 HR DATA REVIEWED -----------------------------------------------    I have personally reviewed the following data over the past 24 hrs:    11.8 (H)  \   14.8   / 234     135 100 19.8 /  97   4.6 25 0.79 \     INR:  2.10 (H) PTT:  N/A   D-dimer:  N/A Fibrinogen:  N/A       Imaging results reviewed over the past 24 hrs:   Recent Results (from the past 24 hour(s))   Head CT w/o contrast    Narrative    CT SCAN OF THE HEAD WITHOUT CONTRAST   9/29/2023 11:52 AM     HISTORY: Fall, patient on warfarin.    TECHNIQUE:  Axial images of the head and coronal reformations without  IV contrast material. Radiation dose for this scan was reduced using  automated exposure control, adjustment of the mA and/or kV according  to patient size, or iterative reconstruction technique.    COMPARISON: Head MRI 2/4/2022    FINDINGS: No evidence of hemorrhage, mass, or hydrocephalus.  Generalized volume loss with background of white matter  hypoattenuation likely representing chronic small vessel ischemic  change. 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 No acute osseous  abnormality. Left scalp nodule, presumed epidermal inclusion or  sebaceous cyst.      Impression    IMPRESSION:   No acute intracranial abnormality.    JESSICA CHAMBERS MD         SYSTEM ID:  [id number]   Cervical spine CT w/o contrast    Narrative    CT CERVICAL SPINE WITHOUT CONTRAST   9/29/2023 11:55 AM     HISTORY: Fall with loss of consciousness.     TECHNIQUE: Axial images of the cervical spine were obtained without  intravenous contrast. Multiplanar reformations were performed.   Radiation dose for this scan was reduced using automated exposure  control, adjustment of the mA and/or kV according to patient size, or  iterative reconstruction technique.    COMPARISON: None.    FINDINGS: No evidence  of acute fracture or posttraumatic subluxation  involving the cervical spine. Mild height loss involving the T1  vertebral body, probably chronic. Alignment is significant for  reversal of the normal cervical lordosis and multilevel subtle grade 1  spondylolisthesis. Multilevel mild-to-moderate degenerative disc  disease and facet arthropathy. Mild spinal canal stenosis at multiple  levels related to disc osteophyte complexes. Mild foraminal stenoses  at multiple levels. Moderate foraminal stenosis on the right at C2-C3.  Severe foraminal stenosis on the right C3-C4. Severe foraminal  stenosis on the left and C4-C5. Moderate foraminal stenosis on the  right at C5-C6. Moderate foraminal stenosis on the left C6-C7.  Moderate foraminal stenosis on the left at C7-T1. Presumed atelectasis  at the lung apices. Scattered vascular calcifications. Small area of  nodular attenuation along the right vocal fold, presumably secretions.  Direct visualization could be considered.      Impression    IMPRESSION: No evidence of acute fracture or subluxation in the  cervical spine.    JESSICA CHAMBERS MD         SYSTEM ID:  [id number]   Thoracic spine XR, 3 views    Narrative    THORACIC SPINE THREE VIEWS   9/29/2023 12:19 PM     HISTORY: Fall, midline thoracic spine pain.    COMPARISON: None.      Impression    IMPRESSION: T12 compression fracture with moderate to severe height  loss, age indeterminate. More subtle height loss of multiple other  vertebral bodies, age-indeterminate. CT or MRI could be performed.  Multilevel degenerative change.    JESSICA CHAMBERS MD         SYSTEM ID:  [id number]

## 2023-10-19 NOTE — PROGRESS NOTES
Patient has upcoming followup appt on 11/21 for thoracic fracture. Xray ordered to get prior to appt.

## 2023-10-23 NOTE — TELEPHONE ENCOUNTER
INR 3.1 today. (Mechanical MVR)  Not yet scheduled for procedure.  Will need to review with Dr. Sanchez once procedure is scheduled.  STACEY DAMON

## 2023-10-23 NOTE — TELEPHONE ENCOUNTER
"Urgent Report receixed from eliezer Clemens: episode of CHB on 10/9/23 at 10:03am that lasted 8.4s w/ HR in the 20s.    Patient presented to [facility] ED on 9/29 after experiencing a syncopal episode:   \"Patient reports that he was in the kitchen cutting sausages when he turned around to get a bag and then \"came to\" flat on the floor. He was feeling normal when he woke up this morning was not lightheaded or dizzy before the fall. He did not notice any heart fluttering and was not aware of hitting his head.\"    Work up was mostly normal but patient \"not low risk per the [place] syncope rule\" and so hospitalization was recommended. Patient declined and was discharged with a heart monitor.    *Important to note: patient is on warfarin for history of mechanical MV replacement. Not on any rate controlling medications.     LVM for patient (non detailed as no C2C on file) requesting a call back to Device RN line so we can assess for any symptoms associated with CHB episode.     Will route message to Dr. Sanchez high priority for review and recommendation.    STACEY RN                                      "

## 2023-10-23 NOTE — TELEPHONE ENCOUNTER
Santosh Sanchez MD  You31 minutes ago (12:56 PM)     QP  Please schedule ppm this week. I'll see pt in the caresuites. Use [employer]otronik. Thanks, qp     Message above received from Dr. Sanchez.    Spoke with patient's son. He is aware and in agreement with recommendations. He knows that this procedure is considered emergent and so the doctor will be meeting with them the day of the procedure to review risks and benefits as opposed to an in clinic appt prior to then.    He confirmed that patient takes warfarin for history of mechnical valve. They will contact patient's INR clinic and get him in for a draw sometime this week. They will call back to the device RN line once INR check is scheduled.    Stat message routed to Ching to assist with scheduling.    STACEY DAMON

## 2023-10-23 NOTE — TELEPHONE ENCOUNTER
Patient's son called back. He was with the patient at the time who gave consent for information to be shared with him.    They are aware of the findings from 10/9 AM. He does not recall experiencing any symptoms.    They are aware that the MD still has to look through all of the information but once he has any recommendations we will call them back.    STACEY DAMON

## 2023-10-24 NOTE — TELEPHONE ENCOUNTER
Per Dr. Sanchez's routing comment:   2.5 mg today and tomorrow and thursday      Called pt's son at [phone], gave him instructions above to take warfarin 2.5mg daily, he states understanding. Told him that further instructions for after the procedure will be given on Friday

## 2023-10-24 NOTE — TELEPHONE ENCOUNTER
Pt is now scheduled for PPM implant on Friday 10/27/2023. Received call from pt's son asking about coumadin dosing.     Pt's INR yesterday was 3.8, this is supra-therapeutic, goal is 2.0-3.0. Pt's PMD told pt to take half his normal dose yesterday, and then stop the coumadin until after his procedure. Pt did take half the dose yesterday. He normally takes his coumadin in the afternoon.     Per previous notes, pt has a mechanical mitral valve. Per our AC protocol, should review AC plan with procedure MD. I told pt's son that we will check with Dr. Sanchez and call him back today at [phone] with the plan.     I called pt's INR clinic at [facility]. Pt's normal warfarin dose is 5mg daily. They confirmed pt does have mechanical mitral valve and mechanical aortic valve.     Will review warfarin dosing with Dr. Sanchez.

## 2023-10-24 NOTE — PROGRESS NOTES
Pre-procedure instructions for PPM implant on 10/27/23 at 8:30am reviewed, sent by fax, and MyChart per patient request:    Anticoagulation: yes, takes warfarin for history of mechanical MVR and AVR.... Instructions regarding warfarin previously discussed with Dr. Sanchez and patient (refer to 10/23 encounter).  Oral diabetes meds: none  Insulin: none  SGLT2 Inhibitors - hold 3-4 days prior to procedure (Invokana, Farxiga, Jardiance, Steglatro): none  GLP-1 Agonists: none  - Byetta (exenitide), Victoza (liraglutide), Ozempic (semaglutide), Trulicity (dulaglutide), Mounjaro (tirzepatide)  - (Weekly dosing, hold GLP-1 agonists 7 days before procedure)  - (Daily or BID dosing, hold GLP-1 agonists day before and day of procedure)  - (Oral semaglutide, hold 7 days before procedure due to long half-life)  Diuretic: none  Contrast allergy: N/A    NPO 8 hours prior to arrival time (10:30pm)  May have clear liquids 2 hours prior to arrival time (4:30am)    -Shower the morning of the procedure, and then put on a clean shirt in order to help prevent infection.     -Post-procedure transportation and 24 hours monitoring set up. Please call before coming in if plans change.  With limited bed availability due to COVID, overnight hospital stays will be allowed for clinical reasons only.    -No driving for 24 hours post procedure due to sedation.     -INR check scheduled: AM of procedure and PRN per Dr. Sanchez  (order entered into Epic)     -Take temperature the morning of the procedure and call [facility] at [phone] if it is above 100.0.  Also call [facility] if pt has any new cold symptoms evening prior or AM of procedure.     -Review arrival time (6:30am) and location.     Pt verbalized understanding of instructions.     MARISOL Zhao

## 2023-10-24 NOTE — LETTER
Salvador Costello      Pre-procedure instructions for pacemaker implant on 10/27/23.    Please arrive at 6:30am to the check in desk on the first floor.  Address: [address] [place] [address]  If using , please pull up to door 2. Otherwise, please utilize the parking structure across [facility] (connected to the hospital via skyway).    NPO 8 hours prior to arrival time... this means please do not have anything to eat after 10:30pm the night before your procedure. You may have clear liquids up until 4:30am  Clear liquid examples: water, coffee (NO CREAMER), tea, jello (NO FRUIT CHUNKS)    You can take your medications in the morning but please hold off on any vitamins, supplements, or medications you are concerned might cause an upset stomach.  Please follow the instructions given to you earlier today in regards to your warfarin dosing    PLEASE CALL US ASAP at [phone] IF YOU TAKE ANY MEDICATIONS LISTED BELOW:  -Medications for diabetes (pills or shots)  -Diuretics (also known as water pills)  -Invokana, Farxiga, Jardiance, or Steglatro  -Byetta (exenitide), Victoza (liraglutide), Ozempic (semaglutide), Trulicity (dulaglutide), Mounjaro (tirzepatide)  -Please also call to let us know if you have any allergies (particularly an allergy to Contrast)    Shower the morning of the procedure, and then put on a clean shirt in order to help prevent infection.   Take your temperature the morning of the procedure and call the procedural area (aka the [facility]) at [phone] if it is above 100.0.  Also call the [facility] with any new cold or flu-like symptoms evening prior or AM of procedure. No need to call this number if you are feeling well and don't have a fever.    You need to have someone available to drive you home from the procedure and monitor you for 24 hours afterwards. *You cannot drive for 24 hours post procedure due to sedation.    Please call us with any questions or concerns.  Thank  [facility]  [phone]

## 2023-10-24 NOTE — TELEPHONE ENCOUNTER
Received a VM from [phone], caller did not leave his name. He said he got VM from scheduling.     I sent Ching in scheduling a message asking her to call this number back to get pt scheduled for his PPM implant.

## 2023-10-27 NOTE — PROGRESS NOTES
[facility] Post Procedure Note    Patient Information  Name: Salvador Costello  Age: 88 year old    Post Procedure  Time patient returned to [facility]: ~0943  Concerns/abnormal assessment: None  If abnormal assessment, provider notified: N/A  Plan/Other: Discharge to  home after  CXR and recovery.      Aissatou Huddleston RN

## 2023-10-27 NOTE — PROGRESS NOTES
Uneventful implantation of a dual chamber ppm. Home after recovering from sedation provided no pneumo on cxr and normal device check.

## 2023-10-27 NOTE — PROGRESS NOTES
[facility] Discharge Nursing Note    Patient Information  Name: Salvador Costello  Age: 88 year old    Discharge Education:  Discharge instructions reviewed: Yes  Additional education/resources provided: Yes  Patient/patient representative verbalizes understanding: Yes  Patient discharging on new medications: No  Medication education completed: Yes    Discharge Plans:   Discharge location: home  Discharge ride contacted: Yes  Approximate discharge time: ~1220    Discharge Criteria:  Discharge criteria met and vital signs stable: Yes    Patient Belongs:  Patient belongings returned to patient: Yes    Aissatou Huddleston RN

## 2023-10-27 NOTE — Clinical Note
Potential access sites were evaluated for patency using ultrasound.   The site  was selected. Access was obtained under with Sonosite guidance using a micropuncture 21 gauge needle with direct visualization of needle entry.

## 2023-10-27 NOTE — PROGRESS NOTES
[facility] Admission Nursing Note    Patient Information  Name: Salvador Costello  Age: 88 year old  Reason for admission: PPM implant   [facility] arrival time: ~0630    Visitor Information  Name: Guillermina-daughter  and son Margarito     Patient Admission/Assessment   Pre-procedure assessment complete: Yes  If abnormal assessment/labs, provider notified: N/A  NPO: Yes  Medications held per instructions/orders: Yes  Consent: deferred  Patient oriented to room: Yes  Education/questions answered: Yes  Plan/other: Procedure ~0830    Discharge Planning  Discharge name/phone number: [phone]  Overnight post sedation caregiver: Yoanna   Discharge location: home    Aissatou Huddleston RN

## 2023-10-27 NOTE — H&P
"[facility]    Cardiac Electrophysiology Consultation     Date of Admission:  10/27/2023  Date of Consult (When I saw the patient): 10/27/23    Assessment & Plan   Salvador Costello is a 88 year old male who was admitted on 10/27/2023. I was asked to see the patient for H/P prior to ppm. Delightful pt with known LBBB  and mechanical valves presented to ED a month ago with syncope without warnings. Pt was sent home with a ZIO. I was made aware of high grade to complete AV block during waking hours around 10 am with the longest pause of 3 seconds. Pt was not on any AVN blocker drugs. Normal mechanical valvular fxn and EF. I recommended an urgent pacemaker as cause of his syncope likely from severe AVB due to intrinsic conduction disease.    I went over the procedure with risks including but not limited to vascular injury and infection.    Santosh Soto MD    Code Status    No Order    Primary Care Physician   Qing Nieves    History is obtained from the patient    Past Medical History   I have reviewed this patient's medical history and updated it with pertinent information if needed.   Past Medical History:   Diagnosis Date    Malignant neoplasm (H)     colon (prior to 2004)       Past Surgical History   I have reviewed this patient's surgical history and updated it with pertinent information if needed.  Past Surgical History:   Procedure Laterality Date    CARDIAC SURGERY  \"[id number]\"    Two valve replacements (aortic and ...)    COLONOSCOPY N/A 10/21/2014    Procedure: COLONOSCOPY;  Surgeon: Brett Reece MD;  Location:  GI    GI SURGERY      colon resection       Prior to Admission Medications   Prior to Admission Medications   Prescriptions Last Dose Informant Patient Reported? Taking?   Warfarin Sodium (COUMADIN PO) 10/26/2023  Yes Yes   Sig: Take  by mouth.     meclizine (ANTIVERT) 12.5 MG tablet 9/29/2023  No No   Sig: Take 1 tablet (12.5 mg) by mouth 4 times daily as needed for " dizziness      Facility-Administered Medications: None     Allergies   No Known Allergies    Social History   I have reviewed this patient's social history and updated it with pertinent information if needed. Salvador Costello  reports that he has quit smoking. He does not have any smokeless tobacco history on file. He reports current alcohol use of about 2.5 standard drinks of alcohol per week.    Family History   I have reviewed this patient's family history and updated it with pertinent information if needed.   History reviewed. No pertinent family history.    Review of Systems   Comprehensive review of systems was performed with pertinent positives and negatives listed in assessment and plan section.    Physical Exam   Temp: 97.3  F (36.3  C) Temp src: Oral BP: 130/75 Pulse: 69   Resp: 16 SpO2: 94 % O2 Device: None (Room air) Oxygen Delivery: 2 LPM  Vital Signs with Ranges  Temp:  [97.3  F (36.3  C)] 97.3  F (36.3  C)  Pulse:  [64-80] 69  Resp:  [16-17] 16  BP: (122-147)/(72-86) 130/75  SpO2:  [94 %-97 %] 94 %  131 lbs 0 oz    Constitutional: awake, alert, cooperative, no apparent distress, and appears stated age  Eyes: Lids and lashes normal, pupils equal, round and reactive to light, extra ocular muscles intact, sclera clear, conjunctiva normal  ENT: Normocephalic, without obvious abnormality, atraumatic, sinuses nontender on palpation, external ears without lesions, oral pharynx with moist mucous membranes, tonsils without erythema or exudates, gums normal and good dentition.  Hematologic / Lymphatic: no cervical lymphadenopathy  Respiratory: No increased work of breathing, good air exchange, clear to auscultation bilaterally, no crackles or wheezing  Cardiovascular: murmurs include systolic murmur I/VI located at right upper sternal border without radiation, mech sound present, normal s1s2  GI: No scars, normal bowel sounds, soft, non-distended, non-tender, no masses palpated, no hepatosplenomegally  Skin: no  bruising or bleeding  Musculoskeletal: There is no redness, warmth, or swelling of the joints.  Full range of motion noted.    Neurologic: Awake, alert,   Neuropsychiatric: General: normal, calm, and normal eye contact    Data   I personally reviewed all recent ECGs and images.  Results for orders placed or performed during the hospital encounter of 10/27/23 (from the past 24 hour(s))   CBC with platelets   Result Value Ref Range    WBC Count 6.6 4.0 - 11.0 10e3/uL    RBC Count 4.53 4.40 - 5.90 10e6/uL    Hemoglobin 13.4 13.3 - 17.7 g/dL    Hematocrit 42.0 40.0 - 53.0 %    MCV 93 78 - 100 fL    MCH 29.6 26.5 - 33.0 pg    MCHC 31.9 31.5 - 36.5 g/dL    RDW 15.3 (H) 10.0 - 15.0 %    Platelet Count 261 150 - 450 10e3/uL   Basic metabolic panel   Result Value Ref Range    Sodium 141 135 - 145 mmol/L    Potassium 5.3 3.4 - 5.3 mmol/L    Chloride 104 98 - 107 mmol/L    Carbon Dioxide (CO2) 29 22 - 29 mmol/L    Anion Gap 8 7 - 15 mmol/L    Urea Nitrogen 25.5 (H) 8.0 - 23.0 mg/dL    Creatinine 0.93 0.67 - 1.17 mg/dL    GFR Estimate 79 >60 mL/min/1.73m2    Calcium 9.4 8.8 - 10.2 mg/dL    Glucose 96 70 - 99 mg/dL   INR point of care   Result Value Ref Range    INR 2.2 (H) 0.9 - 1.1    Narrative    This test is intended for monitoring Coumadin therapy. Results are not accurate in patients with prolonged INR due to factor deficiency.   EP Device    Narrative    Table formatting from the original result was not included.  PROCEDURES PERFORMED:   1. Implantation of a dual-chamber pacemaker, MRI compatible  2. Conscious sedation.   3. Cardiac fluoroscopy    INDICATION: transient CHB    HISTORY OF PRESENT ILLNESS: This is a 88 year old year-old patient with a   history of syncope and noted to have transient CHB considered to be   irreversible who is referred for a permanent pacemaker. Risks of the   procedure was discussed before the procedure including but not limited to   vascular injury, infection, pneumothorax, and cardiac  perforation.    METHOD: I determined this patient to be an appropriate candidate for the   planned sedation and procedure and have reassessed the patient immediately   prior to sedation and procedure. Intravenous antibiotic was given prior to   the procedure. The patient was prepped and draped in the usual manner. 1%   lidocaine was infiltrated into the right cephalic vein area axillary.  An   incision was then made with a #15 blade. The right cephalic vein was   identified and a venotomy was created. A sheath was then glide over the   wire into the vein. A lead was then advanced into the heart and was fixed   into the right ventricular  area. Appropriate sensing and threshold were   obtained. There was no diaphragmatic stimulation with high output pacing.   Another sheath was glided over the wire into the vein. A lead was advanced   to the right atrium and fixed into the right atrial wall. There was no   diaphragmatic stimulation with high output pacing. The leads were then   secured to the pectoralis muscle fascia using O-Ethibond sutures.     A pocket was then fashioned. The leads were then attached to the device   and placed in the pocket.  The pocket was then closed with 2-0 and 4-0   Vicryl sutures. Steri-Strips and an OpSite dressing were then placed over   the incision.  The patient was then transferred back to the [facility] in   stable condition.     DEVICE INFORMATION:  Implant Name Type Inv. Item Serial No.  Lot No. LRB No. Used   Action   LEAD PACING SOLIA S 53 PROMRI - [id number] Leads LEAD PACING SOLIA S 53   PROMRI [id number] [employer] [id number]  1 Implanted   LEAD PACING SOLIA S 45 PROMRI - [id number] Leads LEAD PACING SOLIA S 45   PROMRI [id number] [employer]RONIK [id number]  1 Implanted   PACEMAKER PROMRI DUAL CHAMBER - [id number] Pacemaker PACEMAKER PROMRI DUAL   CHAMBER [id number] BIOTRONIK [id number]  1 Implanted       STIMULATION THRESHOLDS:  RA -  Sense:3.2 mV, Threshold: 1.1 V @  0.4-0.5 ms, Impedance: 487 ohms  RV -  Sense:9.6 mV, Threshold: 0.9 V @ 0.4-0.5 ms, Impedance: 585 ohms    COMPLICATIONS: None.              CONCLUSION:    1. Uneventful implantation of a dual-chamber pacemaker, MRI compatible    Santosh Sanchez MD

## 2023-10-27 NOTE — DISCHARGE INSTRUCTIONS
Pacemaker Implant Discharge Instructions     After you go home:    Have an adult stay with you until tomorrow.  You may resume your normal diet.       For 24 hours - due to the sedation you received:  Relax and take it easy.  Do NOT make any important or legal decisions.  Do NOT drive or operate machines at home or at work.  Do NOT drink alcohol.    Care of Chest Incision:    Keep the bandage on at least 3 days. You may remove the dressing on Monday 10/30/23. Change it only if it gets loose or soaked. If you need to change it, use 4x4-inch gauze and a large clear bandage.   If there is a pressure dressing (gauze & tape) - 24 hours after your procedure you may remove ONLY the top dressing. Leave the bottom dressing on.  Leave the strips of tape on. They will fall off on their own, or we will remove them at your first check-up.  Check your wound daily for signs of infection, such as increased redness, severe swelling or draining. Fever may also be a sign of infection. Call us if you see any of these signs.  If there are no signs of infection, you may shower after the bandage comes off in 3 days. If you take a tub bath, keep the wound dry.  No soaking the incision (swimming pool, bathtub, hot tub) for 2 weeks.  You may have mild to medium pain for 3 to 5 days. Take Acetaminophen (Tylenol) or Ibuprofen (Advil) for the pain. If the pain persists or is severe, call us.    Activity:    For at least 2 weeks: Do not raise your elbow above your shoulder. You can begin to use your arm as it feels comfortable to you.  Do not use arm on implant side to lift more than 10 pounds for 2 weeks.  In 6 to 8 weeks: You may begin to golf, play tennis, swim and do similar activities.  No driving for one day & limit to necessary driving for one week.    Bleeding:    If you start bleeding from the incision site, sit down and press firmly on the site for 10 minutes.   Once bleeding stops, call [facility] as soon as you can.       Call  911 right away if you have heavy bleeding or bleeding that does not stop.      Medicines:    Take your medications, including blood thinners, unless your provider tells you not to.  If you have stopped any medicines, check with your provider about when to restart them.    Follow Up Appointments:    Follow up with Device Clinic at [facility] of patient preference in 7-10 days.    Call the clinic if:    You have a large or growing hard lump around the site.  The site is red, swollen, hot or tender.  Blood or fluid is draining from the site.  You have chills or a fever greater than 101 F (38 C).  You feel dizzy or light-headed.  Questions or concerns    Telling others about your device:    Before you leave the hospital, you will receive a temporary ID card. A permanent card will be mailed to you about 6 to 8 weeks later. Always carry the ID card with you. It has important details about your device.  You may also get a Medical Alert bracelet or tag that says you have a pacemaker.  Go to [email or web address].   Always tell doctors, dentists and other care providers that you have a device implanted in you.  Let us know before you plan any surgeries. Your care team must take special steps to keep you safe during certain procedures. These steps will depend on the type of device you have. Your provider will need to see your ID card. They may need to call us for instructions.    Device Safety:    Please refer to device  s booklet for further information.        [facility] [place]:    [phone] [facility] (7 days a week)

## 2023-10-27 NOTE — PROGRESS NOTES
Received report from Aissatou HERRON RN. Pt had a PPM and is recovering well, VSS. Rep was in to see him and finished at 1105. Pt went to Xray at 1110. Once Xray is back and shows no issues he will be discharged.

## 2023-10-27 NOTE — Clinical Note
Hemodynamic equipment used: 5 lead ECG, [employer] With 3 Leads, Machine BP Cuff and pulse oximeter probe.

## 2023-10-30 NOTE — TELEPHONE ENCOUNTER
Post Dual Chamber Pacemaker device implant discharge phone call.    Reviewed the following:  - No raising arm above shoulder on the side of implant for 3 weeks  - Remove outer dressing 3 days after implant. May shower after outer dressing removed.   - Leave steri-strips in place, will be removed at 1 week device check  - Limit driving for: 1 week (PPM)  - Watch for redness, drainage, warmth, or fever. Call device clinic if any signs of infection.     1 week device check scheduled: 1 week 11/8/23 at 1130 in [facility], 6 week 1/3/24 at 0930.  Order also entered for 3 month HALLIE visit.    Pt's son states understanding of all instructions.      MARISOL Ramos

## 2023-11-21 NOTE — LETTER
"    11/21/2023         RE: Salvador Costello  [address]  [place] [address]        Dear Colleague,    Thank you for referring your patient, Salvador Costello, to the [facility] [place]. Please see a copy of my visit note below.    NEUROSURGERY CLINIC CONSULT NOTE     DATE OF VISIT: 11/21/2023     SUBJECTIVE:     Salvador Costello is a pleasant 88 year old male who presents to the clinic today for consultation on a traumatic T12 compression fracture. He is referred to the Neurosurgery Clinic by Dr. Nieves in Primary Care.   Today, he reports a two-month history of symptoms. He describes a daily with fluctuating intensity, dull, aching, pain that initiates in the midline low thoracic region and does not radiate what-so-ever and is not accompanied by paresthesia, numbness or perceived weakness. He was not provided with a brace, by choice. He rates his pain 0/10 when sitting and 06/10 when standing. There are no bowel or bladder changes. He denies saddle anesthesia.        Current Outpatient Medications:      meclizine (ANTIVERT) 12.5 MG tablet, Take 1 tablet (12.5 mg) by mouth 4 times daily as needed for dizziness, Disp: 10 tablet, Rfl: 0     Warfarin Sodium (COUMADIN PO), Take  by mouth.  , Disp: , Rfl:      No Known Allergies     Past Medical History:   Diagnosis Date     Malignant neoplasm (H)     colon (prior to 2004)        ROS: 10 point review of symptoms are negative other than the symptoms noted above in the HPI.     Family History has been reviewed with the patient, there are no pertinent findings to presenting concern.     Past Surgical History:   Procedure Laterality Date     CARDIAC SURGERY  \"[id number]\"    Two valve replacements (aortic and ...)     COLONOSCOPY N/A 10/21/2014    Procedure: COLONOSCOPY;  Surgeon: Brett Reece MD;  Location:  GI     EP PACEMAKER DEVICE & LEAD IMPLANT- RIGHT ATRIAL & RIGHT VENTRICULAR N/A 10/27/2023    Procedure: Pacemaker " Device & Lead Implant - Right Atrial & Right Ventricular;  Surgeon: Santosh Sanchez MD;  Location:  HEART CARDIAC CATH LAB     GI SURGERY      colon resection        Social History     Tobacco Use     Smoking status: Former   Substance Use Topics     Alcohol use: Yes     Alcohol/week: 2.5 standard drinks of alcohol     Types: 3 Cans of beer per week     Comment: a beer a day-has not has one for 1 month        OBJECTIVE:   /80   Pulse 74   SpO2 97%    There is no height or weight on file to calculate BMI.     Imaging:     THORACIC SPINE TWO VIEWS November 21, 2023 2:36 PM     Findings, per radiologist read, notable for:      Limited study due to osteopenia and overlying structures.  Severe anterior wedge compression fracture deformity of the T12  vertebral body again noted. No definite new fractures.    Full radiological report in chart. Imaging was reviewed with with patient today.     Exam:     Patient appears comfortable, conversational, and in no apparent distress.   Head: Normocephalic, without obvious abnormality, atraumatic, no facial asymmetry.   Eyes: conjunctivae/corneas clear. PERRL, EOM's intact.   Throat: lips, mucosa, and tongue normal; teeth and gums normal.   Neck: supple, symmetrical, trachea midline, no adenopathy and thyroid: not enlarged, symmetric, no tenderness/mass/nodules.   Lungs: clear to auscultation bilaterally.   Heart: regular rate and rhythm.   Abdomen: soft, non-tender; bowel sounds normal; no masses, no organomegaly.   Pulses: 2+ and symmetric.   Skin: Skin color, texture, turgor normal. No rashes or lesions.     CN II-XII grossly intact, alert and appropriate with conversation and following commands.   Cervical spine is non tender to palpation. Appropriate range of motion of neck, not concerning for lhermitte's phenomenon.   Bilateral bicep 2/4 and tricep reflexes 1/4. Sensation intact throughout upper extremities.     UE muscle strength  Right  Left    Deltoid  5/5  5/5     Biceps  5/5  5/5    Triceps  5/5  5/5    Hand intrinsics  5/5  5/5    Hand grasp  5/5  5/5    Albright signs  neg  neg      Thoracolumbar spine is tender to palpation at T12.  Intact sensation throughout lower extremities.   Bilateral patellar 2/4 and achilles reflex 1/4.     LE muscle strength  Right  Left    Iliopsoas (hip flexion)  5/5  5/5    Quad (knee extension)  5/5  5/5    Hamstring (knee flexion)  5/5  5/5    Gastrocnemius (PF)  5/5  5/5    Tibialis Ant. (DF)  5/5  5/5    EHL  5/5  5/5      Negative Babinski bilaterally. Negative for clonus.   Calves are soft and non-tender bilaterally.       ASSESSMENT/PLAN:     Salvador Costello is a 88 year old male who presents to the clinic for consultation on a traumatic T12 fracture that has resulted in a two-month history a daily with fluctuating intensity, dull, aching, pain that initiates in the midline low thoracic region and does not radiate what-so-ever and is not accompanied by paresthesia, numbness or perceived weakness. The patient's most recent imaging was reviewed with him today. It was explained that images do appear to be stable. On exam, he is noted to have appropriate strength, sensation and range of motion.     We encouraged him to continue to avoid excessive bending, twisting, and turning the lumbar spine and to avoid excessive jostling and jarring activities.     We would like to see him back in six weeks with updated imaging and to further discuss possible surgical interventions or other conservative therapies. In the event that patient's symptoms worsen or change we would like to see him sooner. We also discussed signs of a worsening problem that he should seek being evaluated.        Respectfully,     LEE Sierra, AMNA  [facility]  [facility]  Tel: [phone]      Exam, imaging, and plan reviewed by Dr. Carter.       Again, thank you for allowing me to participate in the  care of your patient.        Sincerely,        Junaid Clarke PA-C

## 2023-11-21 NOTE — PROGRESS NOTES
"NEUROSURGERY CLINIC CONSULT NOTE     DATE OF VISIT: 11/21/2023     SUBJECTIVE:     Salvador Costello is a pleasant 88 year old male who presents to the clinic today for consultation on a traumatic T12 compression fracture. He is referred to the Neurosurgery Clinic by Dr. Nieves in Primary Care.   Today, he reports a two-month history of symptoms. He describes a daily with fluctuating intensity, dull, aching, pain that initiates in the midline low thoracic region and does not radiate what-so-ever and is not accompanied by paresthesia, numbness or perceived weakness. He was not provided with a brace, by choice. He rates his pain 0/10 when sitting and 06/10 when standing. There are no bowel or bladder changes. He denies saddle anesthesia.        Current Outpatient Medications:     meclizine (ANTIVERT) 12.5 MG tablet, Take 1 tablet (12.5 mg) by mouth 4 times daily as needed for dizziness, Disp: 10 tablet, Rfl: 0    Warfarin Sodium (COUMADIN PO), Take  by mouth.  , Disp: , Rfl:      No Known Allergies     Past Medical History:   Diagnosis Date    Malignant neoplasm (H)     colon (prior to 2004)        ROS: 10 point review of symptoms are negative other than the symptoms noted above in the HPI.     Family History has been reviewed with the patient, there are no pertinent findings to presenting concern.     Past Surgical History:   Procedure Laterality Date    CARDIAC SURGERY  \"[id number]\"    Two valve replacements (aortic and ...)    COLONOSCOPY N/A 10/21/2014    Procedure: COLONOSCOPY;  Surgeon: Brett Reece MD;  Location:  GI    EP PACEMAKER DEVICE & LEAD IMPLANT- RIGHT ATRIAL & RIGHT VENTRICULAR N/A 10/27/2023    Procedure: Pacemaker Device & Lead Implant - Right Atrial & Right Ventricular;  Surgeon: Santosh Sanchez MD;  Location: [facility] CARDIAC CATH LAB    GI SURGERY      colon resection        Social History     Tobacco Use    Smoking status: Former   Substance Use Topics    Alcohol use: Yes     Alcohol/week: " 2.5 standard drinks of alcohol     Types: 3 Cans of beer per week     Comment: a beer a day-has not has one for 1 month        OBJECTIVE:   /80   Pulse 74   SpO2 97%    There is no height or weight on file to calculate BMI.     Imaging:     THORACIC SPINE TWO VIEWS November 21, 2023 2:36 PM     Findings, per radiologist read, notable for:      Limited study due to osteopenia and overlying structures.  Severe anterior wedge compression fracture deformity of the T12  vertebral body again noted. No definite new fractures.    Full radiological report in chart. Imaging was reviewed with with patient today.     Exam:     Patient appears comfortable, conversational, and in no apparent distress.   Head: Normocephalic, without obvious abnormality, atraumatic, no facial asymmetry.   Eyes: conjunctivae/corneas clear. PERRL, EOM's intact.   Throat: lips, mucosa, and tongue normal; teeth and gums normal.   Neck: supple, symmetrical, trachea midline, no adenopathy and thyroid: not enlarged, symmetric, no tenderness/mass/nodules.   Lungs: clear to auscultation bilaterally.   Heart: regular rate and rhythm.   Abdomen: soft, non-tender; bowel sounds normal; no masses, no organomegaly.   Pulses: 2+ and symmetric.   Skin: Skin color, texture, turgor normal. No rashes or lesions.     CN II-XII grossly intact, alert and appropriate with conversation and following commands.   Cervical spine is non tender to palpation. Appropriate range of motion of neck, not concerning for lhermitte's phenomenon.   Bilateral bicep 2/4 and tricep reflexes 1/4. Sensation intact throughout upper extremities.     UE muscle strength  Right  Left    Deltoid  5/5  5/5    Biceps  5/5  5/5    Triceps  5/5  5/5    Hand intrinsics  5/5  5/5    Hand grasp  5/5  5/5    Albright signs  neg  neg      Thoracolumbar spine is tender to palpation at T12.  Intact sensation throughout lower extremities.   Bilateral patellar 2/4 and achilles reflex 1/4.     LE muscle  strength  Right  Left    Iliopsoas (hip flexion)  5/5  5/5    Quad (knee extension)  5/5  5/5    Hamstring (knee flexion)  5/5  5/5    Gastrocnemius (PF)  5/5  5/5    Tibialis Ant. (DF)  5/5  5/5    EHL  5/5  5/5      Negative Babinski bilaterally. Negative for clonus.   Calves are soft and non-tender bilaterally.       ASSESSMENT/PLAN:     Salvador Costello is a 88 year old male who presents to the clinic for consultation on a traumatic T12 fracture that has resulted in a two-month history a daily with fluctuating intensity, dull, aching, pain that initiates in the midline low thoracic region and does not radiate what-so-ever and is not accompanied by paresthesia, numbness or perceived weakness. The patient's most recent imaging was reviewed with him today. It was explained that images do appear to be stable. On exam, he is noted to have appropriate strength, sensation and range of motion.     We encouraged him to continue to avoid excessive bending, twisting, and turning the lumbar spine and to avoid excessive jostling and jarring activities.     We would like to see him back in six weeks with updated imaging and to further discuss possible surgical interventions or other conservative therapies. In the event that patient's symptoms worsen or change we would like to see him sooner. We also discussed signs of a worsening problem that he should seek being evaluated.        Respectfully,     LEE Sierra, AMNA  [facility] Neurosurgery  [facility]  Tel: [phone]      Exam, imaging, and plan reviewed by Dr. Carter.

## 2023-11-21 NOTE — TELEPHONE ENCOUNTER
Let message for patient okay to come to the clinic today anytime between now and the scheduled appointment.  Imaging will still be able to take the images prior to seeing the provider.

## 2023-11-21 NOTE — NURSING NOTE
Salvador Costello is a 88 year old male who presents for:  Chief Complaint   Patient presents with    Hospital F/U        Vitals:    Vitals:    11/21/23 1439   BP: 132/80   Pulse: 74   SpO2: 97%       BMI:  Estimated body mass index is 17.77 kg/m  as calculated from the following:    Height as of 10/27/23: 6' (1.829 m).    Weight as of 10/27/23: 131 lb (59.4 kg).    Pain Score:  No Pain (0)        Maday Auguste

## 2023-11-22 NOTE — TELEPHONE ENCOUNTER
LVM for patient's daughter Guillermina to call clinic back to schedule 4-6 week follow up at  with HALLIE for compression fracture follow up with XR prior to appointment. Advised any  can help schedule.

## 2024-01-01 ENCOUNTER — APPOINTMENT (OUTPATIENT)
Dept: GENERAL RADIOLOGY | Facility: CLINIC | Age: 89
End: 2024-01-01
Attending: SURGERY
Payer: COMMERCIAL

## 2024-01-01 ENCOUNTER — HOSPITAL ENCOUNTER (OUTPATIENT)
Facility: CLINIC | Age: 89
Setting detail: OBSERVATION
Discharge: HOME | End: 2024-02-21
Attending: EMERGENCY MEDICINE | Admitting: HOSPITALIST
Payer: COMMERCIAL

## 2024-01-01 ENCOUNTER — APPOINTMENT (OUTPATIENT)
Dept: CT IMAGING | Facility: CLINIC | Age: 89
DRG: 329 | End: 2024-01-01
Attending: EMERGENCY MEDICINE
Payer: COMMERCIAL

## 2024-01-01 ENCOUNTER — ANCILLARY PROCEDURE (OUTPATIENT)
Dept: CARDIOLOGY | Facility: CLINIC | Age: 89
End: 2024-01-01
Attending: INTERNAL MEDICINE
Payer: COMMERCIAL

## 2024-01-01 ENCOUNTER — APPOINTMENT (OUTPATIENT)
Dept: GENERAL RADIOLOGY | Facility: CLINIC | Age: 89
DRG: 329 | End: 2024-01-01
Attending: SURGERY
Payer: COMMERCIAL

## 2024-01-01 ENCOUNTER — ANCILLARY PROCEDURE (OUTPATIENT)
Dept: GENERAL RADIOLOGY | Facility: CLINIC | Age: 89
End: 2024-01-01
Attending: PHYSICIAN ASSISTANT
Payer: COMMERCIAL

## 2024-01-01 ENCOUNTER — APPOINTMENT (OUTPATIENT)
Dept: GENERAL RADIOLOGY | Facility: CLINIC | Age: 89
DRG: 329 | End: 2024-01-01
Attending: STUDENT IN AN ORGANIZED HEALTH CARE EDUCATION/TRAINING PROGRAM
Payer: COMMERCIAL

## 2024-01-01 ENCOUNTER — HOSPITAL ENCOUNTER (INPATIENT)
Facility: CLINIC | Age: 89
LOS: 4 days | DRG: 329 | End: 2024-09-02
Attending: EMERGENCY MEDICINE | Admitting: STUDENT IN AN ORGANIZED HEALTH CARE EDUCATION/TRAINING PROGRAM
Payer: COMMERCIAL

## 2024-01-01 ENCOUNTER — OFFICE VISIT (OUTPATIENT)
Dept: NEUROSURGERY | Facility: CLINIC | Age: 89
End: 2024-01-01
Attending: PHYSICIAN ASSISTANT
Payer: COMMERCIAL

## 2024-01-01 ENCOUNTER — ANESTHESIA EVENT (OUTPATIENT)
Dept: SURGERY | Facility: CLINIC | Age: 89
DRG: 329 | End: 2024-01-01
Payer: COMMERCIAL

## 2024-01-01 ENCOUNTER — APPOINTMENT (OUTPATIENT)
Dept: GENERAL RADIOLOGY | Facility: CLINIC | Age: 89
DRG: 329 | End: 2024-01-01
Attending: INTERNAL MEDICINE
Payer: COMMERCIAL

## 2024-01-01 ENCOUNTER — APPOINTMENT (OUTPATIENT)
Dept: GENERAL RADIOLOGY | Facility: CLINIC | Age: 89
DRG: 329 | End: 2024-01-01
Attending: ANESTHESIOLOGY
Payer: COMMERCIAL

## 2024-01-01 ENCOUNTER — APPOINTMENT (OUTPATIENT)
Dept: CT IMAGING | Facility: CLINIC | Age: 89
End: 2024-01-01
Attending: EMERGENCY MEDICINE
Payer: COMMERCIAL

## 2024-01-01 ENCOUNTER — ANESTHESIA (OUTPATIENT)
Dept: SURGERY | Facility: CLINIC | Age: 89
DRG: 329 | End: 2024-01-01
Payer: COMMERCIAL

## 2024-01-01 VITALS
HEART RATE: 70 BPM | DIASTOLIC BLOOD PRESSURE: 59 MMHG | RESPIRATION RATE: 20 BRPM | TEMPERATURE: 98.1 F | HEIGHT: 72 IN | WEIGHT: 136.24 LBS | OXYGEN SATURATION: 95 % | SYSTOLIC BLOOD PRESSURE: 115 MMHG | BODY MASS INDEX: 18.45 KG/M2

## 2024-01-01 VITALS
WEIGHT: 138 LBS | TEMPERATURE: 102 F | BODY MASS INDEX: 19.32 KG/M2 | OXYGEN SATURATION: 91 % | SYSTOLIC BLOOD PRESSURE: 88 MMHG | HEART RATE: 113 BPM | HEIGHT: 71 IN | DIASTOLIC BLOOD PRESSURE: 58 MMHG | RESPIRATION RATE: 25 BRPM

## 2024-01-01 VITALS
DIASTOLIC BLOOD PRESSURE: 66 MMHG | HEIGHT: 72 IN | SYSTOLIC BLOOD PRESSURE: 126 MMHG | BODY MASS INDEX: 18.18 KG/M2 | HEART RATE: 82 BPM | OXYGEN SATURATION: 98 % | WEIGHT: 134.2 LBS

## 2024-01-01 VITALS
DIASTOLIC BLOOD PRESSURE: 88 MMHG | SYSTOLIC BLOOD PRESSURE: 136 MMHG | OXYGEN SATURATION: 98 % | HEART RATE: 89 BPM | WEIGHT: 135.4 LBS | BODY MASS INDEX: 18.34 KG/M2 | HEIGHT: 72 IN

## 2024-01-01 DIAGNOSIS — Z95.0 CARDIAC PACEMAKER IN SITU: ICD-10-CM

## 2024-01-01 DIAGNOSIS — S22.080D CLOSED WEDGE COMPRESSION FRACTURE OF T12 VERTEBRA WITH ROUTINE HEALING, SUBSEQUENT ENCOUNTER: Primary | ICD-10-CM

## 2024-01-01 DIAGNOSIS — I44.2 COMPLETE HEART BLOCK (H): ICD-10-CM

## 2024-01-01 DIAGNOSIS — K56.609 SBO (SMALL BOWEL OBSTRUCTION) (H): ICD-10-CM

## 2024-01-01 DIAGNOSIS — R55 SYNCOPE, UNSPECIFIED SYNCOPE TYPE: ICD-10-CM

## 2024-01-01 DIAGNOSIS — S22.080A CLOSED WEDGE COMPRESSION FRACTURE OF T12 VERTEBRA, INITIAL ENCOUNTER (H): ICD-10-CM

## 2024-01-01 LAB
ACANTHOCYTES BLD QL SMEAR: SLIGHT
ALBUMIN SERPL BCG-MCNC: 2.8 G/DL (ref 3.5–5.2)
ALBUMIN SERPL BCG-MCNC: 3.1 G/DL (ref 3.5–5.2)
ALBUMIN SERPL BCG-MCNC: 3.1 G/DL (ref 3.5–5.2)
ALBUMIN SERPL BCG-MCNC: 4.4 G/DL (ref 3.5–5.2)
ALBUMIN SERPL BCG-MCNC: 4.4 G/DL (ref 3.5–5.2)
ALBUMIN UR-MCNC: 30 MG/DL
ALLEN'S TEST: YES
ALP SERPL-CCNC: 34 U/L (ref 40–150)
ALP SERPL-CCNC: 36 U/L (ref 40–150)
ALP SERPL-CCNC: 45 U/L (ref 40–150)
ALP SERPL-CCNC: 71 U/L (ref 40–150)
ALP SERPL-CCNC: 74 U/L (ref 40–150)
ALT SERPL W P-5'-P-CCNC: 23 U/L (ref 0–70)
ALT SERPL W P-5'-P-CCNC: 23 U/L (ref 0–70)
ALT SERPL W P-5'-P-CCNC: 632 U/L (ref 0–70)
ALT SERPL W P-5'-P-CCNC: 680 U/L (ref 0–70)
ALT SERPL W P-5'-P-CCNC: 766 U/L (ref 0–70)
AMORPH CRY #/AREA URNS HPF: ABNORMAL /HPF
ANION GAP SERPL CALCULATED.3IONS-SCNC: 12 MMOL/L (ref 7–15)
ANION GAP SERPL CALCULATED.3IONS-SCNC: 13 MMOL/L (ref 7–15)
ANION GAP SERPL CALCULATED.3IONS-SCNC: 13 MMOL/L (ref 7–15)
ANION GAP SERPL CALCULATED.3IONS-SCNC: 14 MMOL/L (ref 7–15)
ANION GAP SERPL CALCULATED.3IONS-SCNC: 16 MMOL/L (ref 7–15)
ANION GAP SERPL CALCULATED.3IONS-SCNC: 20 MMOL/L (ref 7–15)
ANION GAP SERPL CALCULATED.3IONS-SCNC: 8 MMOL/L (ref 7–15)
APPEARANCE UR: ABNORMAL
AST SERPL W P-5'-P-CCNC: 1076 U/L (ref 0–45)
AST SERPL W P-5'-P-CCNC: 1746 U/L (ref 0–45)
AST SERPL W P-5'-P-CCNC: 40 U/L (ref 0–45)
AST SERPL W P-5'-P-CCNC: 41 U/L (ref 0–45)
AST SERPL W P-5'-P-CCNC: 779 U/L (ref 0–45)
ATRIAL RATE - MUSE: 93 BPM
BACTERIA #/AREA URNS HPF: ABNORMAL /HPF
BASE EXCESS BLDA CALC-SCNC: -6.4 MMOL/L (ref -3–3)
BASE EXCESS BLDV CALC-SCNC: -1.4 MMOL/L (ref -3–3)
BASE EXCESS BLDV CALC-SCNC: -10.5 MMOL/L (ref -3–3)
BASE EXCESS BLDV CALC-SCNC: -3.9 MMOL/L (ref -3–3)
BASOPHILS # BLD AUTO: 0 10E3/UL (ref 0–0.2)
BASOPHILS # BLD AUTO: 0.1 10E3/UL (ref 0–0.2)
BASOPHILS # BLD MANUAL: 0 10E3/UL (ref 0–0.2)
BASOPHILS NFR BLD AUTO: 0 %
BASOPHILS NFR BLD AUTO: 0 %
BASOPHILS NFR BLD AUTO: 1 %
BASOPHILS NFR BLD AUTO: 2 %
BASOPHILS NFR BLD MANUAL: 0 %
BILIRUB SERPL-MCNC: 1.1 MG/DL
BILIRUB SERPL-MCNC: 1.2 MG/DL
BILIRUB SERPL-MCNC: 1.4 MG/DL
BILIRUB SERPL-MCNC: 1.5 MG/DL
BILIRUB SERPL-MCNC: 2.1 MG/DL
BILIRUB UR QL STRIP: NEGATIVE
BUN SERPL-MCNC: 21.4 MG/DL (ref 8–23)
BUN SERPL-MCNC: 22.6 MG/DL (ref 8–23)
BUN SERPL-MCNC: 23.6 MG/DL (ref 8–23)
BUN SERPL-MCNC: 31.9 MG/DL (ref 8–23)
BUN SERPL-MCNC: 38.2 MG/DL (ref 8–23)
BUN SERPL-MCNC: 58.1 MG/DL (ref 8–23)
BUN SERPL-MCNC: 62.6 MG/DL (ref 8–23)
BUN SERPL-MCNC: 65.8 MG/DL (ref 8–23)
BUN SERPL-MCNC: 69.2 MG/DL (ref 8–23)
BUN SERPL-MCNC: 76.7 MG/DL (ref 8–23)
BURR CELLS BLD QL SMEAR: ABNORMAL
BURR CELLS BLD QL SMEAR: ABNORMAL
BURR CELLS BLD QL SMEAR: SLIGHT
CALCIUM SERPL-MCNC: 6.6 MG/DL (ref 8.8–10.4)
CALCIUM SERPL-MCNC: 7 MG/DL (ref 8.8–10.4)
CALCIUM SERPL-MCNC: 7.2 MG/DL (ref 8.8–10.4)
CALCIUM SERPL-MCNC: 7.5 MG/DL (ref 8.8–10.4)
CALCIUM SERPL-MCNC: 8.5 MG/DL (ref 8.8–10.4)
CALCIUM SERPL-MCNC: 8.7 MG/DL (ref 8.8–10.2)
CALCIUM SERPL-MCNC: 8.8 MG/DL (ref 8.8–10.4)
CALCIUM SERPL-MCNC: 9.1 MG/DL (ref 8.8–10.4)
CALCIUM SERPL-MCNC: 9.3 MG/DL (ref 8.8–10.4)
CALCIUM SERPL-MCNC: 9.5 MG/DL (ref 8.8–10.2)
CHLORIDE SERPL-SCNC: 100 MMOL/L (ref 98–107)
CHLORIDE SERPL-SCNC: 102 MMOL/L (ref 98–107)
CHLORIDE SERPL-SCNC: 103 MMOL/L (ref 98–107)
CHLORIDE SERPL-SCNC: 103 MMOL/L (ref 98–107)
CHLORIDE SERPL-SCNC: 104 MMOL/L (ref 98–107)
CHLORIDE SERPL-SCNC: 106 MMOL/L (ref 98–107)
CHLORIDE SERPL-SCNC: 107 MMOL/L (ref 98–107)
CHLORIDE SERPL-SCNC: 108 MMOL/L (ref 98–107)
CHLORIDE SERPL-SCNC: 109 MMOL/L (ref 98–107)
CHLORIDE SERPL-SCNC: 99 MMOL/L (ref 98–107)
COHGB MFR BLD: 94.9 % (ref 95–96)
COLOR UR AUTO: YELLOW
CREAT SERPL-MCNC: 0.76 MG/DL (ref 0.67–1.17)
CREAT SERPL-MCNC: 0.85 MG/DL (ref 0.67–1.17)
CREAT SERPL-MCNC: 0.86 MG/DL (ref 0.67–1.17)
CREAT SERPL-MCNC: 0.93 MG/DL (ref 0.67–1.17)
CREAT SERPL-MCNC: 0.95 MG/DL (ref 0.67–1.17)
CREAT SERPL-MCNC: 1.41 MG/DL (ref 0.67–1.17)
CREAT SERPL-MCNC: 1.85 MG/DL (ref 0.67–1.17)
CREAT SERPL-MCNC: 2.21 MG/DL (ref 0.67–1.17)
CREAT SERPL-MCNC: 2.38 MG/DL (ref 0.67–1.17)
CREAT SERPL-MCNC: 2.79 MG/DL (ref 0.67–1.17)
DEPRECATED HCO3 PLAS-SCNC: 25 MMOL/L (ref 22–29)
DEPRECATED HCO3 PLAS-SCNC: 28 MMOL/L (ref 22–29)
DIASTOLIC BLOOD PRESSURE - MUSE: NORMAL MMHG
EGFRCR SERPLBLD CKD-EPI 2021: 21 ML/MIN/1.73M2
EGFRCR SERPLBLD CKD-EPI 2021: 25 ML/MIN/1.73M2
EGFRCR SERPLBLD CKD-EPI 2021: 28 ML/MIN/1.73M2
EGFRCR SERPLBLD CKD-EPI 2021: 34 ML/MIN/1.73M2
EGFRCR SERPLBLD CKD-EPI 2021: 48 ML/MIN/1.73M2
EGFRCR SERPLBLD CKD-EPI 2021: 77 ML/MIN/1.73M2
EGFRCR SERPLBLD CKD-EPI 2021: 78 ML/MIN/1.73M2
EGFRCR SERPLBLD CKD-EPI 2021: 83 ML/MIN/1.73M2
EGFRCR SERPLBLD CKD-EPI 2021: 84 ML/MIN/1.73M2
EGFRCR SERPLBLD CKD-EPI 2021: 86 ML/MIN/1.73M2
ELLIPTOCYTES BLD QL SMEAR: SLIGHT
EOSINOPHIL # BLD AUTO: 0 10E3/UL (ref 0–0.7)
EOSINOPHIL # BLD MANUAL: 0 10E3/UL (ref 0–0.7)
EOSINOPHIL NFR BLD AUTO: 0 %
EOSINOPHIL NFR BLD MANUAL: 0 %
ERYTHROCYTE [DISTWIDTH] IN BLOOD BY AUTOMATED COUNT: 14.6 % (ref 10–15)
ERYTHROCYTE [DISTWIDTH] IN BLOOD BY AUTOMATED COUNT: 15.4 % (ref 10–15)
ERYTHROCYTE [DISTWIDTH] IN BLOOD BY AUTOMATED COUNT: 15.8 % (ref 10–15)
ERYTHROCYTE [DISTWIDTH] IN BLOOD BY AUTOMATED COUNT: 15.9 % (ref 10–15)
ERYTHROCYTE [DISTWIDTH] IN BLOOD BY AUTOMATED COUNT: 15.9 % (ref 10–15)
ERYTHROCYTE [DISTWIDTH] IN BLOOD BY AUTOMATED COUNT: 16 % (ref 10–15)
ERYTHROCYTE [DISTWIDTH] IN BLOOD BY AUTOMATED COUNT: 16.1 % (ref 10–15)
ERYTHROCYTE [DISTWIDTH] IN BLOOD BY AUTOMATED COUNT: 16.1 % (ref 10–15)
ERYTHROCYTE [DISTWIDTH] IN BLOOD BY AUTOMATED COUNT: 16.2 % (ref 10–15)
GLUCOSE BLDC GLUCOMTR-MCNC: 114 MG/DL (ref 70–99)
GLUCOSE BLDC GLUCOMTR-MCNC: 130 MG/DL (ref 70–99)
GLUCOSE BLDC GLUCOMTR-MCNC: 66 MG/DL (ref 70–99)
GLUCOSE BLDC GLUCOMTR-MCNC: 96 MG/DL (ref 70–99)
GLUCOSE SERPL-MCNC: 102 MG/DL (ref 70–99)
GLUCOSE SERPL-MCNC: 118 MG/DL (ref 70–99)
GLUCOSE SERPL-MCNC: 138 MG/DL (ref 70–99)
GLUCOSE SERPL-MCNC: 142 MG/DL (ref 70–99)
GLUCOSE SERPL-MCNC: 168 MG/DL (ref 70–99)
GLUCOSE SERPL-MCNC: 169 MG/DL (ref 70–99)
GLUCOSE SERPL-MCNC: 169 MG/DL (ref 70–99)
GLUCOSE SERPL-MCNC: 174 MG/DL (ref 70–99)
GLUCOSE SERPL-MCNC: 67 MG/DL (ref 70–99)
GLUCOSE SERPL-MCNC: 97 MG/DL (ref 70–99)
GLUCOSE UR STRIP-MCNC: NEGATIVE MG/DL
HCO3 BLD-SCNC: 20 MMOL/L (ref 21–28)
HCO3 BLDV-SCNC: 18 MMOL/L (ref 21–28)
HCO3 BLDV-SCNC: 23 MMOL/L (ref 21–28)
HCO3 BLDV-SCNC: 24 MMOL/L (ref 21–28)
HCO3 SERPL-SCNC: 16 MMOL/L (ref 22–29)
HCO3 SERPL-SCNC: 19 MMOL/L (ref 22–29)
HCO3 SERPL-SCNC: 20 MMOL/L (ref 22–29)
HCO3 SERPL-SCNC: 21 MMOL/L (ref 22–29)
HCO3 SERPL-SCNC: 22 MMOL/L (ref 22–29)
HCO3 SERPL-SCNC: 22 MMOL/L (ref 22–29)
HCO3 SERPL-SCNC: 23 MMOL/L (ref 22–29)
HCO3 SERPL-SCNC: 26 MMOL/L (ref 22–29)
HCT VFR BLD AUTO: 32.6 % (ref 40–53)
HCT VFR BLD AUTO: 35.8 % (ref 40–53)
HCT VFR BLD AUTO: 38.7 % (ref 40–53)
HCT VFR BLD AUTO: 41.9 % (ref 40–53)
HCT VFR BLD AUTO: 42.2 % (ref 40–53)
HCT VFR BLD AUTO: 42.4 % (ref 40–53)
HCT VFR BLD AUTO: 42.8 % (ref 40–53)
HCT VFR BLD AUTO: 42.9 % (ref 40–53)
HCT VFR BLD AUTO: 43 % (ref 40–53)
HCT VFR BLD AUTO: 43.2 % (ref 40–53)
HCT VFR BLD AUTO: 43.8 % (ref 40–53)
HCT VFR BLD AUTO: 44.5 % (ref 40–53)
HGB BLD-MCNC: 10.3 G/DL (ref 13.3–17.7)
HGB BLD-MCNC: 11 G/DL (ref 13.3–17.7)
HGB BLD-MCNC: 11.9 G/DL (ref 13.3–17.7)
HGB BLD-MCNC: 13.3 G/DL (ref 13.3–17.7)
HGB BLD-MCNC: 13.3 G/DL (ref 13.3–17.7)
HGB BLD-MCNC: 13.6 G/DL (ref 13.3–17.7)
HGB BLD-MCNC: 13.7 G/DL (ref 13.3–17.7)
HGB BLD-MCNC: 13.9 G/DL (ref 13.3–17.7)
HGB BLD-MCNC: 14 G/DL (ref 13.3–17.7)
HGB BLD-MCNC: 14 G/DL (ref 13.3–17.7)
HGB BLD-MCNC: 14.2 G/DL (ref 13.3–17.7)
HGB BLD-MCNC: 14.6 G/DL (ref 13.3–17.7)
HGB UR QL STRIP: NEGATIVE
HOLD SPECIMEN: NORMAL
HOLD SPECIMEN: NORMAL
HYALINE CASTS: 1 /LPF
IMM GRANULOCYTES # BLD: 0 10E3/UL
IMM GRANULOCYTES # BLD: 0.1 10E3/UL
IMM GRANULOCYTES NFR BLD: 0 %
IMM GRANULOCYTES NFR BLD: 1 %
IMM GRANULOCYTES NFR BLD: 1 %
IMM GRANULOCYTES NFR BLD: 2 %
INR PPP: 1.42 (ref 0.85–1.15)
INR PPP: 1.64 (ref 0.85–1.15)
INR PPP: 2.13 (ref 0.85–1.15)
INR PPP: 2.3 (ref 0.85–1.15)
INR PPP: 2.32 (ref 0.85–1.15)
INR PPP: 2.56 (ref 0.85–1.15)
INR PPP: 2.91 (ref 0.85–1.15)
INR PPP: 3.01 (ref 0.85–1.15)
INR PPP: 3.48 (ref 0.85–1.15)
INTERPRETATION ECG - MUSE: NORMAL
KETONES UR STRIP-MCNC: NEGATIVE MG/DL
LACTATE SERPL-SCNC: 0.9 MMOL/L (ref 0.7–2)
LACTATE SERPL-SCNC: 1.9 MMOL/L (ref 0.7–2)
LACTATE SERPL-SCNC: 2.1 MMOL/L (ref 0.7–2)
LACTATE SERPL-SCNC: 3.8 MMOL/L (ref 0.7–2)
LACTATE SERPL-SCNC: 4 MMOL/L (ref 0.7–2)
LACTATE SERPL-SCNC: 4.2 MMOL/L (ref 0.7–2)
LACTATE SERPL-SCNC: 4.7 MMOL/L (ref 0.7–2)
LACTATE SERPL-SCNC: 8.3 MMOL/L (ref 0.7–2)
LEUKOCYTE ESTERASE UR QL STRIP: NEGATIVE
LIPASE SERPL-CCNC: 27 U/L (ref 13–60)
LIPASE SERPL-CCNC: 29 U/L (ref 13–60)
LYMPHOCYTES # BLD AUTO: 0.3 10E3/UL (ref 0.8–5.3)
LYMPHOCYTES # BLD AUTO: 0.4 10E3/UL (ref 0.8–5.3)
LYMPHOCYTES # BLD AUTO: 0.4 10E3/UL (ref 0.8–5.3)
LYMPHOCYTES # BLD AUTO: 0.5 10E3/UL (ref 0.8–5.3)
LYMPHOCYTES # BLD MANUAL: 0.2 10E3/UL (ref 0.8–5.3)
LYMPHOCYTES NFR BLD AUTO: 16 %
LYMPHOCYTES NFR BLD AUTO: 3 %
LYMPHOCYTES NFR BLD AUTO: 4 %
LYMPHOCYTES NFR BLD AUTO: 4 %
LYMPHOCYTES NFR BLD MANUAL: 2 %
MAGNESIUM SERPL-MCNC: 2.2 MG/DL (ref 1.7–2.3)
MAGNESIUM SERPL-MCNC: 2.3 MG/DL (ref 1.7–2.3)
MAGNESIUM SERPL-MCNC: 2.3 MG/DL (ref 1.7–2.3)
MCH RBC QN AUTO: 29 PG (ref 26.5–33)
MCH RBC QN AUTO: 29.1 PG (ref 26.5–33)
MCH RBC QN AUTO: 29.1 PG (ref 26.5–33)
MCH RBC QN AUTO: 29.2 PG (ref 26.5–33)
MCH RBC QN AUTO: 29.4 PG (ref 26.5–33)
MCH RBC QN AUTO: 29.5 PG (ref 26.5–33)
MCH RBC QN AUTO: 29.6 PG (ref 26.5–33)
MCH RBC QN AUTO: 29.6 PG (ref 26.5–33)
MCH RBC QN AUTO: 29.7 PG (ref 26.5–33)
MCH RBC QN AUTO: 29.9 PG (ref 26.5–33)
MCHC RBC AUTO-ENTMCNC: 30.7 G/DL (ref 31.5–36.5)
MCHC RBC AUTO-ENTMCNC: 30.7 G/DL (ref 31.5–36.5)
MCHC RBC AUTO-ENTMCNC: 30.8 G/DL (ref 31.5–36.5)
MCHC RBC AUTO-ENTMCNC: 31.5 G/DL (ref 31.5–36.5)
MCHC RBC AUTO-ENTMCNC: 31.6 G/DL (ref 31.5–36.5)
MCHC RBC AUTO-ENTMCNC: 32.1 G/DL (ref 31.5–36.5)
MCHC RBC AUTO-ENTMCNC: 32.3 G/DL (ref 31.5–36.5)
MCHC RBC AUTO-ENTMCNC: 32.4 G/DL (ref 31.5–36.5)
MCHC RBC AUTO-ENTMCNC: 32.6 G/DL (ref 31.5–36.5)
MCHC RBC AUTO-ENTMCNC: 32.7 G/DL (ref 31.5–36.5)
MCHC RBC AUTO-ENTMCNC: 32.7 G/DL (ref 31.5–36.5)
MCHC RBC AUTO-ENTMCNC: 32.8 G/DL (ref 31.5–36.5)
MCV RBC AUTO: 89 FL (ref 78–100)
MCV RBC AUTO: 90 FL (ref 78–100)
MCV RBC AUTO: 91 FL (ref 78–100)
MCV RBC AUTO: 92 FL (ref 78–100)
MCV RBC AUTO: 92 FL (ref 78–100)
MCV RBC AUTO: 94 FL (ref 78–100)
MCV RBC AUTO: 95 FL (ref 78–100)
MDC_IDC_EPISODE_DTM: NORMAL
MDC_IDC_EPISODE_ID: 2
MDC_IDC_EPISODE_TYPE: NORMAL
MDC_IDC_EPISODE_TYPE_INDUCED: NO
MDC_IDC_EPISODE_VENDOR_TYPE: NORMAL
MDC_IDC_LEAD_CONNECTION_STATUS: NORMAL
MDC_IDC_LEAD_IMPLANT_DT: NORMAL
MDC_IDC_LEAD_LOCATION: NORMAL
MDC_IDC_LEAD_LOCATION_DETAIL_1: NORMAL
MDC_IDC_LEAD_MFG: NORMAL
MDC_IDC_LEAD_MODEL: NORMAL
MDC_IDC_LEAD_POLARITY_TYPE: NORMAL
MDC_IDC_LEAD_SERIAL: NORMAL
MDC_IDC_MSMT_BATTERY_DTM: NORMAL
MDC_IDC_MSMT_BATTERY_REMAINING_PERCENTAGE: 100 %
MDC_IDC_MSMT_BATTERY_REMAINING_PERCENTAGE: 100 %
MDC_IDC_MSMT_BATTERY_REMAINING_PERCENTAGE: 95 %
MDC_IDC_MSMT_BATTERY_STATUS: NORMAL
MDC_IDC_MSMT_LEADCHNL_RA_IMPEDANCE_VALUE: 410 OHM
MDC_IDC_MSMT_LEADCHNL_RA_IMPEDANCE_VALUE: 468 OHM
MDC_IDC_MSMT_LEADCHNL_RA_IMPEDANCE_VALUE: 488 OHM
MDC_IDC_MSMT_LEADCHNL_RA_LEAD_CHANNEL_STATUS: NORMAL
MDC_IDC_MSMT_LEADCHNL_RA_PACING_THRESHOLD_AMPLITUDE: 1.1 V
MDC_IDC_MSMT_LEADCHNL_RA_PACING_THRESHOLD_AMPLITUDE: 1.9 V
MDC_IDC_MSMT_LEADCHNL_RA_PACING_THRESHOLD_AMPLITUDE: 2 V
MDC_IDC_MSMT_LEADCHNL_RA_PACING_THRESHOLD_PULSEWIDTH: 0.4 MS
MDC_IDC_MSMT_LEADCHNL_RV_IMPEDANCE_VALUE: 566 OHM
MDC_IDC_MSMT_LEADCHNL_RV_IMPEDANCE_VALUE: 585 OHM
MDC_IDC_MSMT_LEADCHNL_RV_IMPEDANCE_VALUE: 585 OHM
MDC_IDC_MSMT_LEADCHNL_RV_LEAD_CHANNEL_STATUS: NORMAL
MDC_IDC_MSMT_LEADCHNL_RV_PACING_THRESHOLD_AMPLITUDE: 0.6 V
MDC_IDC_MSMT_LEADCHNL_RV_PACING_THRESHOLD_AMPLITUDE: 0.7 V
MDC_IDC_MSMT_LEADCHNL_RV_PACING_THRESHOLD_AMPLITUDE: 0.7 V
MDC_IDC_MSMT_LEADCHNL_RV_PACING_THRESHOLD_PULSEWIDTH: 0.4 MS
MDC_IDC_PG_IMPLANT_DTM: NORMAL
MDC_IDC_PG_MFG: NORMAL
MDC_IDC_PG_MODEL: NORMAL
MDC_IDC_PG_SERIAL: NORMAL
MDC_IDC_PG_TYPE: NORMAL
MDC_IDC_SESS_CLINIC_NAME: NORMAL
MDC_IDC_SESS_DTM: NORMAL
MDC_IDC_SESS_REPROGRAMMED: NO
MDC_IDC_SESS_REPROGRAMMED: NO
MDC_IDC_SESS_TYPE: NORMAL
MDC_IDC_SET_BRADY_AT_MODE_SWITCH_MODE: NORMAL
MDC_IDC_SET_BRADY_AT_MODE_SWITCH_RATE: 160 {BEATS}/MIN
MDC_IDC_SET_BRADY_LOWRATE: 60 {BEATS}/MIN
MDC_IDC_SET_BRADY_MAX_SENSOR_RATE: 120 {BEATS}/MIN
MDC_IDC_SET_BRADY_MAX_TRACKING_RATE: 130 {BEATS}/MIN
MDC_IDC_SET_BRADY_MODE: NORMAL
MDC_IDC_SET_BRADY_PAV_DELAY_HIGH: 190 MS
MDC_IDC_SET_BRADY_PAV_DELAY_LOW: 230 MS
MDC_IDC_SET_BRADY_SAV_DELAY_HIGH: 160 MS
MDC_IDC_SET_BRADY_SAV_DELAY_LOW: 200 MS
MDC_IDC_SET_LEADCHNL_RA_PACING_AMPLITUDE: 2.1 V
MDC_IDC_SET_LEADCHNL_RA_PACING_AMPLITUDE: 2.9 V
MDC_IDC_SET_LEADCHNL_RA_PACING_AMPLITUDE: 3 V
MDC_IDC_SET_LEADCHNL_RA_PACING_ANODE_ELECTRODE_1: NORMAL
MDC_IDC_SET_LEADCHNL_RA_PACING_ANODE_ELECTRODE_1: NORMAL
MDC_IDC_SET_LEADCHNL_RA_PACING_ANODE_LOCATION_1: NORMAL
MDC_IDC_SET_LEADCHNL_RA_PACING_ANODE_LOCATION_1: NORMAL
MDC_IDC_SET_LEADCHNL_RA_PACING_CAPTURE_MODE: NORMAL
MDC_IDC_SET_LEADCHNL_RA_PACING_CATHODE_ELECTRODE_1: NORMAL
MDC_IDC_SET_LEADCHNL_RA_PACING_CATHODE_ELECTRODE_1: NORMAL
MDC_IDC_SET_LEADCHNL_RA_PACING_CATHODE_LOCATION_1: NORMAL
MDC_IDC_SET_LEADCHNL_RA_PACING_CATHODE_LOCATION_1: NORMAL
MDC_IDC_SET_LEADCHNL_RA_PACING_POLARITY: NORMAL
MDC_IDC_SET_LEADCHNL_RA_PACING_PULSEWIDTH: 0.4 MS
MDC_IDC_SET_LEADCHNL_RA_SENSING_ADAPTATION_MODE: NORMAL
MDC_IDC_SET_LEADCHNL_RA_SENSING_ANODE_ELECTRODE_1: NORMAL
MDC_IDC_SET_LEADCHNL_RA_SENSING_ANODE_ELECTRODE_1: NORMAL
MDC_IDC_SET_LEADCHNL_RA_SENSING_ANODE_LOCATION_1: NORMAL
MDC_IDC_SET_LEADCHNL_RA_SENSING_ANODE_LOCATION_1: NORMAL
MDC_IDC_SET_LEADCHNL_RA_SENSING_CATHODE_ELECTRODE_1: NORMAL
MDC_IDC_SET_LEADCHNL_RA_SENSING_CATHODE_ELECTRODE_1: NORMAL
MDC_IDC_SET_LEADCHNL_RA_SENSING_CATHODE_LOCATION_1: NORMAL
MDC_IDC_SET_LEADCHNL_RA_SENSING_CATHODE_LOCATION_1: NORMAL
MDC_IDC_SET_LEADCHNL_RA_SENSING_POLARITY: NORMAL
MDC_IDC_SET_LEADCHNL_RV_PACING_AMPLITUDE: 1.1 V
MDC_IDC_SET_LEADCHNL_RV_PACING_AMPLITUDE: 1.2 V
MDC_IDC_SET_LEADCHNL_RV_PACING_AMPLITUDE: 1.2 V
MDC_IDC_SET_LEADCHNL_RV_PACING_ANODE_ELECTRODE_1: NORMAL
MDC_IDC_SET_LEADCHNL_RV_PACING_ANODE_ELECTRODE_1: NORMAL
MDC_IDC_SET_LEADCHNL_RV_PACING_ANODE_LOCATION_1: NORMAL
MDC_IDC_SET_LEADCHNL_RV_PACING_ANODE_LOCATION_1: NORMAL
MDC_IDC_SET_LEADCHNL_RV_PACING_CAPTURE_MODE: NORMAL
MDC_IDC_SET_LEADCHNL_RV_PACING_CATHODE_ELECTRODE_1: NORMAL
MDC_IDC_SET_LEADCHNL_RV_PACING_CATHODE_ELECTRODE_1: NORMAL
MDC_IDC_SET_LEADCHNL_RV_PACING_CATHODE_LOCATION_1: NORMAL
MDC_IDC_SET_LEADCHNL_RV_PACING_CATHODE_LOCATION_1: NORMAL
MDC_IDC_SET_LEADCHNL_RV_PACING_POLARITY: NORMAL
MDC_IDC_SET_LEADCHNL_RV_PACING_PULSEWIDTH: 0.4 MS
MDC_IDC_SET_LEADCHNL_RV_SENSING_ADAPTATION_MODE: NORMAL
MDC_IDC_SET_LEADCHNL_RV_SENSING_ANODE_ELECTRODE_1: NORMAL
MDC_IDC_SET_LEADCHNL_RV_SENSING_ANODE_ELECTRODE_1: NORMAL
MDC_IDC_SET_LEADCHNL_RV_SENSING_ANODE_LOCATION_1: NORMAL
MDC_IDC_SET_LEADCHNL_RV_SENSING_ANODE_LOCATION_1: NORMAL
MDC_IDC_SET_LEADCHNL_RV_SENSING_CATHODE_ELECTRODE_1: NORMAL
MDC_IDC_SET_LEADCHNL_RV_SENSING_CATHODE_ELECTRODE_1: NORMAL
MDC_IDC_SET_LEADCHNL_RV_SENSING_CATHODE_LOCATION_1: NORMAL
MDC_IDC_SET_LEADCHNL_RV_SENSING_CATHODE_LOCATION_1: NORMAL
MDC_IDC_SET_LEADCHNL_RV_SENSING_POLARITY: NORMAL
MDC_IDC_STAT_AT_BURDEN_PERCENT: 0 %
MDC_IDC_STAT_AT_DTM_END: NORMAL
MDC_IDC_STAT_AT_DTM_START: NORMAL
MDC_IDC_STAT_AT_MODE_SW_COUNT: 1
MDC_IDC_STAT_AT_MODE_SW_COUNT_PER_DAY: 0
MDC_IDC_STAT_AT_MODE_SW_COUNT_PER_DAY: 0
MDC_IDC_STAT_AT_MODE_SW_PERCENT_TIME_PER_DAY: 0 %
MDC_IDC_STAT_AT_MODE_SW_PERCENT_TIME_PER_DAY: 0 %
MDC_IDC_STAT_BRADY_AP_VP_PERCENT: 3 %
MDC_IDC_STAT_BRADY_AP_VP_PERCENT: 4 %
MDC_IDC_STAT_BRADY_AP_VS_PERCENT: 21 %
MDC_IDC_STAT_BRADY_AP_VS_PERCENT: 23 %
MDC_IDC_STAT_BRADY_AS_VP_PERCENT: 10 %
MDC_IDC_STAT_BRADY_AS_VP_PERCENT: 12 %
MDC_IDC_STAT_BRADY_AS_VS_PERCENT: 61 %
MDC_IDC_STAT_BRADY_AS_VS_PERCENT: 61 %
MDC_IDC_STAT_BRADY_DTM_END: NORMAL
MDC_IDC_STAT_BRADY_DTM_START: NORMAL
MDC_IDC_STAT_BRADY_RA_PERCENT_PACED: 25 %
MDC_IDC_STAT_BRADY_RA_PERCENT_PACED: 27 %
MDC_IDC_STAT_BRADY_RA_PERCENT_PACED: 28 %
MDC_IDC_STAT_BRADY_RV_PERCENT_PACED: 1 %
MDC_IDC_STAT_BRADY_RV_PERCENT_PACED: 13 %
MDC_IDC_STAT_BRADY_RV_PERCENT_PACED: 16 %
MDC_IDC_STAT_EPISODE_RECENT_COUNT: 0
MDC_IDC_STAT_EPISODE_RECENT_COUNT: 0
MDC_IDC_STAT_EPISODE_RECENT_COUNT_DTM_END: NORMAL
MDC_IDC_STAT_EPISODE_RECENT_COUNT_DTM_END: NORMAL
MDC_IDC_STAT_EPISODE_RECENT_COUNT_DTM_START: NORMAL
MDC_IDC_STAT_EPISODE_RECENT_COUNT_DTM_START: NORMAL
MDC_IDC_STAT_EPISODE_TYPE: NORMAL
MDC_IDC_STAT_EPISODE_TYPE: NORMAL
MDC_IDC_STAT_EPISODE_VENDOR_TYPE: NORMAL
MDC_IDC_STAT_HEART_RATE_ATRIAL_MEAN: 79 {BEATS}/MIN
MDC_IDC_STAT_HEART_RATE_ATRIAL_MEAN: 79 {BEATS}/MIN
MDC_IDC_STAT_HEART_RATE_DTM_END: NORMAL
MDC_IDC_STAT_HEART_RATE_DTM_END: NORMAL
MDC_IDC_STAT_HEART_RATE_DTM_START: NORMAL
MDC_IDC_STAT_HEART_RATE_DTM_START: NORMAL
MDC_IDC_STAT_HEART_RATE_VENTRICULAR_MEAN: 80 {BEATS}/MIN
MDC_IDC_STAT_HEART_RATE_VENTRICULAR_MEAN: 81 {BEATS}/MIN
METAMYELOCYTES # BLD MANUAL: 0.5 10E3/UL
METAMYELOCYTES NFR BLD MANUAL: 6 %
MONOCYTES # BLD AUTO: 0.2 10E3/UL (ref 0–1.3)
MONOCYTES # BLD AUTO: 0.3 10E3/UL (ref 0–1.3)
MONOCYTES # BLD AUTO: 0.4 10E3/UL (ref 0–1.3)
MONOCYTES # BLD AUTO: 1.4 10E3/UL (ref 0–1.3)
MONOCYTES # BLD MANUAL: 0.2 10E3/UL (ref 0–1.3)
MONOCYTES NFR BLD AUTO: 18 %
MONOCYTES NFR BLD AUTO: 3 %
MONOCYTES NFR BLD AUTO: 4 %
MONOCYTES NFR BLD AUTO: 7 %
MONOCYTES NFR BLD MANUAL: 3 %
MUCOUS THREADS #/AREA URNS LPF: PRESENT /LPF
MYELOCYTES # BLD MANUAL: 0.1 10E3/UL
MYELOCYTES NFR BLD MANUAL: 1 %
NEUTROPHILS # BLD AUTO: 10.4 10E3/UL (ref 1.6–8.3)
NEUTROPHILS # BLD AUTO: 2.2 10E3/UL (ref 1.6–8.3)
NEUTROPHILS # BLD AUTO: 6.1 10E3/UL (ref 1.6–8.3)
NEUTROPHILS # BLD AUTO: 9.1 10E3/UL (ref 1.6–8.3)
NEUTROPHILS # BLD MANUAL: 7.2 10E3/UL (ref 1.6–8.3)
NEUTROPHILS NFR BLD AUTO: 74 %
NEUTROPHILS NFR BLD AUTO: 77 %
NEUTROPHILS NFR BLD AUTO: 92 %
NEUTROPHILS NFR BLD AUTO: 93 %
NEUTROPHILS NFR BLD MANUAL: 88 %
NITRATE UR QL: NEGATIVE
NRBC # BLD AUTO: 0 10E3/UL
NRBC BLD AUTO-RTO: 0 /100
O2/TOTAL GAS SETTING VFR VENT: 100 %
O2/TOTAL GAS SETTING VFR VENT: 30 %
O2/TOTAL GAS SETTING VFR VENT: 30 %
O2/TOTAL GAS SETTING VFR VENT: 80 %
OXYHGB MFR BLDV: 45 % (ref 70–75)
OXYHGB MFR BLDV: 47 % (ref 70–75)
OXYHGB MFR BLDV: 66 % (ref 70–75)
P AXIS - MUSE: 35 DEGREES
PCO2 BLD: 44 MM HG (ref 35–45)
PCO2 BLDV: 43 MM HG (ref 40–50)
PCO2 BLDV: 46 MM HG (ref 40–50)
PCO2 BLDV: 51 MM HG (ref 40–50)
PEEP: 8 CM H2O
PH BLD: 7.27 [PH] (ref 7.35–7.45)
PH BLDV: 7.16 [PH] (ref 7.32–7.43)
PH BLDV: 7.3 [PH] (ref 7.32–7.43)
PH BLDV: 7.36 [PH] (ref 7.32–7.43)
PH UR STRIP: 7.5 [PH] (ref 5–7)
PLAT MORPH BLD: ABNORMAL
PLATELET # BLD AUTO: 158 10E3/UL (ref 150–450)
PLATELET # BLD AUTO: 171 10E3/UL (ref 150–450)
PLATELET # BLD AUTO: 184 10E3/UL (ref 150–450)
PLATELET # BLD AUTO: 218 10E3/UL (ref 150–450)
PLATELET # BLD AUTO: 240 10E3/UL (ref 150–450)
PLATELET # BLD AUTO: 244 10E3/UL (ref 150–450)
PLATELET # BLD AUTO: 261 10E3/UL (ref 150–450)
PLATELET # BLD AUTO: 262 10E3/UL (ref 150–450)
PLATELET # BLD AUTO: 267 10E3/UL (ref 150–450)
PLATELET # BLD AUTO: 270 10E3/UL (ref 150–450)
PLATELET # BLD AUTO: 310 10E3/UL (ref 150–450)
PLATELET # BLD AUTO: 311 10E3/UL (ref 150–450)
PO2 BLD: 85 MM HG (ref 80–105)
PO2 BLDV: 29 MM HG (ref 25–47)
PO2 BLDV: 36 MM HG (ref 25–47)
PO2 BLDV: 41 MM HG (ref 25–47)
POTASSIUM SERPL-SCNC: 3.9 MMOL/L (ref 3.4–5.3)
POTASSIUM SERPL-SCNC: 4.2 MMOL/L (ref 3.4–5.3)
POTASSIUM SERPL-SCNC: 4.2 MMOL/L (ref 3.4–5.3)
POTASSIUM SERPL-SCNC: 4.4 MMOL/L (ref 3.4–5.3)
POTASSIUM SERPL-SCNC: 4.4 MMOL/L (ref 3.4–5.3)
POTASSIUM SERPL-SCNC: 4.5 MMOL/L (ref 3.4–5.3)
POTASSIUM SERPL-SCNC: 4.7 MMOL/L (ref 3.4–5.3)
POTASSIUM SERPL-SCNC: 6.5 MMOL/L (ref 3.4–5.3)
PR INTERVAL - MUSE: 210 MS
PROT SERPL-MCNC: 4 G/DL (ref 6.4–8.3)
PROT SERPL-MCNC: 4.2 G/DL (ref 6.4–8.3)
PROT SERPL-MCNC: 4.5 G/DL (ref 6.4–8.3)
PROT SERPL-MCNC: 7 G/DL (ref 6.4–8.3)
PROT SERPL-MCNC: 7.3 G/DL (ref 6.4–8.3)
QRS DURATION - MUSE: 152 MS
QT - MUSE: 410 MS
QTC - MUSE: 509 MS
R AXIS - MUSE: -32 DEGREES
RBC # BLD AUTO: 3.48 10E6/UL (ref 4.4–5.9)
RBC # BLD AUTO: 3.77 10E6/UL (ref 4.4–5.9)
RBC # BLD AUTO: 4.11 10E6/UL (ref 4.4–5.9)
RBC # BLD AUTO: 4.57 10E6/UL (ref 4.4–5.9)
RBC # BLD AUTO: 4.59 10E6/UL (ref 4.4–5.9)
RBC # BLD AUTO: 4.62 10E6/UL (ref 4.4–5.9)
RBC # BLD AUTO: 4.67 10E6/UL (ref 4.4–5.9)
RBC # BLD AUTO: 4.68 10E6/UL (ref 4.4–5.9)
RBC # BLD AUTO: 4.71 10E6/UL (ref 4.4–5.9)
RBC # BLD AUTO: 4.82 10E6/UL (ref 4.4–5.9)
RBC # BLD AUTO: 4.83 10E6/UL (ref 4.4–5.9)
RBC # BLD AUTO: 4.94 10E6/UL (ref 4.4–5.9)
RBC MORPH BLD: ABNORMAL
RBC URINE: 3 /HPF
SAO2 % BLDA: 94 % (ref 92–100)
SAO2 % BLDV: 46.4 % (ref 70–75)
SAO2 % BLDV: 48 % (ref 70–75)
SAO2 % BLDV: 66.9 % (ref 70–75)
SODIUM SERPL-SCNC: 137 MMOL/L (ref 135–145)
SODIUM SERPL-SCNC: 138 MMOL/L (ref 135–145)
SODIUM SERPL-SCNC: 138 MMOL/L (ref 135–145)
SODIUM SERPL-SCNC: 139 MMOL/L (ref 135–145)
SODIUM SERPL-SCNC: 141 MMOL/L (ref 135–145)
SODIUM SERPL-SCNC: 142 MMOL/L (ref 135–145)
SODIUM SERPL-SCNC: 142 MMOL/L (ref 135–145)
SODIUM SERPL-SCNC: 143 MMOL/L (ref 135–145)
SODIUM SERPL-SCNC: 143 MMOL/L (ref 135–145)
SODIUM SERPL-SCNC: 144 MMOL/L (ref 135–145)
SP GR UR STRIP: 1.02 (ref 1–1.03)
SQUAMOUS EPITHELIAL: 1 /HPF
SYSTOLIC BLOOD PRESSURE - MUSE: NORMAL MMHG
T AXIS - MUSE: 120 DEGREES
TOXIC GRANULES BLD QL SMEAR: PRESENT
TOXIC GRANULES BLD QL SMEAR: PRESENT
UFH PPP CHRO-ACNC: 0.21 IU/ML
UFH PPP CHRO-ACNC: 0.28 IU/ML
UFH PPP CHRO-ACNC: 0.47 IU/ML
UROBILINOGEN UR STRIP-MCNC: NORMAL MG/DL
VENTRICULAR RATE- MUSE: 93 BPM
WBC # BLD AUTO: 11.3 10E3/UL (ref 4–11)
WBC # BLD AUTO: 13.6 10E3/UL (ref 4–11)
WBC # BLD AUTO: 3 10E3/UL (ref 4–11)
WBC # BLD AUTO: 4.4 10E3/UL (ref 4–11)
WBC # BLD AUTO: 4.7 10E3/UL (ref 4–11)
WBC # BLD AUTO: 6.2 10E3/UL (ref 4–11)
WBC # BLD AUTO: 6.4 10E3/UL (ref 4–11)
WBC # BLD AUTO: 7.1 10E3/UL (ref 4–11)
WBC # BLD AUTO: 7.9 10E3/UL (ref 4–11)
WBC # BLD AUTO: 8.2 10E3/UL (ref 4–11)
WBC # BLD AUTO: 9.7 10E3/UL (ref 4–11)
WBC # BLD AUTO: 9.8 10E3/UL (ref 4–11)
WBC URINE: 3 /HPF

## 2024-01-01 PROCEDURE — 250N000011 HC RX IP 250 OP 636: Performed by: INTERNAL MEDICINE

## 2024-01-01 PROCEDURE — 250N000009 HC RX 250: Performed by: ANESTHESIOLOGY

## 2024-01-01 PROCEDURE — 258N000003 HC RX IP 258 OP 636: Performed by: INTERNAL MEDICINE

## 2024-01-01 PROCEDURE — 370N000017 HC ANESTHESIA TECHNICAL FEE, PER MIN: Performed by: SURGERY

## 2024-01-01 PROCEDURE — 82805 BLOOD GASES W/O2 SATURATION: CPT | Performed by: INTERNAL MEDICINE

## 2024-01-01 PROCEDURE — 85610 PROTHROMBIN TIME: CPT | Performed by: EMERGENCY MEDICINE

## 2024-01-01 PROCEDURE — 99285 EMERGENCY DEPT VISIT HI MDM: CPT | Mod: 25

## 2024-01-01 PROCEDURE — 250N000011 HC RX IP 250 OP 636: Performed by: SURGERY

## 2024-01-01 PROCEDURE — 87040 BLOOD CULTURE FOR BACTERIA: CPT | Performed by: EMERGENCY MEDICINE

## 2024-01-01 PROCEDURE — 0WJP4ZZ INSPECTION OF GASTROINTESTINAL TRACT, PERCUTANEOUS ENDOSCOPIC APPROACH: ICD-10-PCS | Performed by: SURGERY

## 2024-01-01 PROCEDURE — 258N000003 HC RX IP 258 OP 636: Performed by: STUDENT IN AN ORGANIZED HEALTH CARE EDUCATION/TRAINING PROGRAM

## 2024-01-01 PROCEDURE — 36415 COLL VENOUS BLD VENIPUNCTURE: CPT | Performed by: EMERGENCY MEDICINE

## 2024-01-01 PROCEDURE — 99239 HOSP IP/OBS DSCHRG MGMT >30: CPT | Performed by: HOSPITALIST

## 2024-01-01 PROCEDURE — 250N000009 HC RX 250: Performed by: SURGERY

## 2024-01-01 PROCEDURE — 83605 ASSAY OF LACTIC ACID: CPT | Performed by: INTERNAL MEDICINE

## 2024-01-01 PROCEDURE — 99291 CRITICAL CARE FIRST HOUR: CPT | Mod: 24 | Performed by: INTERNAL MEDICINE

## 2024-01-01 PROCEDURE — 258N000001 HC RX 258: Performed by: INTERNAL MEDICINE

## 2024-01-01 PROCEDURE — 82310 ASSAY OF CALCIUM: CPT | Performed by: EMERGENCY MEDICINE

## 2024-01-01 PROCEDURE — 250N000009 HC RX 250: Performed by: INTERNAL MEDICINE

## 2024-01-01 PROCEDURE — 99231 SBSQ HOSP IP/OBS SF/LOW 25: CPT | Performed by: PHYSICIAN ASSISTANT

## 2024-01-01 PROCEDURE — 272N000001 HC OR GENERAL SUPPLY STERILE: Performed by: SURGERY

## 2024-01-01 PROCEDURE — 250N000011 HC RX IP 250 OP 636: Performed by: EMERGENCY MEDICINE

## 2024-01-01 PROCEDURE — 93294 REM INTERROG EVL PM/LDLS PM: CPT | Performed by: INTERNAL MEDICINE

## 2024-01-01 PROCEDURE — 83735 ASSAY OF MAGNESIUM: CPT | Performed by: SURGERY

## 2024-01-01 PROCEDURE — 999N000127 HC STATISTIC PERIPHERAL IV START W US GUIDANCE

## 2024-01-01 PROCEDURE — 120N000001 HC R&B MED SURG/OB

## 2024-01-01 PROCEDURE — 250N000009 HC RX 250: Performed by: PHYSICIAN ASSISTANT

## 2024-01-01 PROCEDURE — 250N000025 HC SEVOFLURANE, PER MIN: Performed by: SURGERY

## 2024-01-01 PROCEDURE — 85027 COMPLETE CBC AUTOMATED: CPT | Performed by: STUDENT IN AN ORGANIZED HEALTH CARE EDUCATION/TRAINING PROGRAM

## 2024-01-01 PROCEDURE — 360N000076 HC SURGERY LEVEL 3, PER MIN: Performed by: SURGERY

## 2024-01-01 PROCEDURE — 96375 TX/PRO/DX INJ NEW DRUG ADDON: CPT

## 2024-01-01 PROCEDURE — 85520 HEPARIN ASSAY: CPT | Performed by: INTERNAL MEDICINE

## 2024-01-01 PROCEDURE — 36600 WITHDRAWAL OF ARTERIAL BLOOD: CPT

## 2024-01-01 PROCEDURE — 93296 REM INTERROG EVL PM/IDS: CPT | Performed by: INTERNAL MEDICINE

## 2024-01-01 PROCEDURE — 94003 VENT MGMT INPAT SUBQ DAY: CPT

## 2024-01-01 PROCEDURE — 93010 ELECTROCARDIOGRAM REPORT: CPT | Performed by: INTERNAL MEDICINE

## 2024-01-01 PROCEDURE — 36415 COLL VENOUS BLD VENIPUNCTURE: CPT | Performed by: PHYSICIAN ASSISTANT

## 2024-01-01 PROCEDURE — 74019 RADEX ABDOMEN 2 VIEWS: CPT

## 2024-01-01 PROCEDURE — 85027 COMPLETE CBC AUTOMATED: CPT | Performed by: SURGERY

## 2024-01-01 PROCEDURE — 99222 1ST HOSP IP/OBS MODERATE 55: CPT | Performed by: SURGERY

## 2024-01-01 PROCEDURE — 84155 ASSAY OF PROTEIN SERUM: CPT | Performed by: INTERNAL MEDICINE

## 2024-01-01 PROCEDURE — 999N000065 XR CHEST PORT 1 VIEW

## 2024-01-01 PROCEDURE — 83605 ASSAY OF LACTIC ACID: CPT | Performed by: EMERGENCY MEDICINE

## 2024-01-01 PROCEDURE — 999N000157 HC STATISTIC RCP TIME EA 10 MIN

## 2024-01-01 PROCEDURE — 85014 HEMATOCRIT: CPT | Performed by: INTERNAL MEDICINE

## 2024-01-01 PROCEDURE — 250N000013 HC RX MED GY IP 250 OP 250 PS 637: Performed by: HOSPITALIST

## 2024-01-01 PROCEDURE — 99213 OFFICE O/P EST LOW 20 MIN: CPT | Performed by: NURSE PRACTITIONER

## 2024-01-01 PROCEDURE — 44005 FREEING OF BOWEL ADHESION: CPT | Mod: AS | Performed by: SURGERY

## 2024-01-01 PROCEDURE — 250N000009 HC RX 250: Performed by: EMERGENCY MEDICINE

## 2024-01-01 PROCEDURE — 99232 SBSQ HOSP IP/OBS MODERATE 35: CPT | Performed by: STUDENT IN AN ORGANIZED HEALTH CARE EDUCATION/TRAINING PROGRAM

## 2024-01-01 PROCEDURE — 999N000185 HC STATISTIC TRANSPORT TIME EA 15 MIN

## 2024-01-01 PROCEDURE — P9045 ALBUMIN (HUMAN), 5%, 250 ML: HCPCS | Performed by: ANESTHESIOLOGY

## 2024-01-01 PROCEDURE — G0378 HOSPITAL OBSERVATION PER HR: HCPCS

## 2024-01-01 PROCEDURE — 74177 CT ABD & PELVIS W/CONTRAST: CPT

## 2024-01-01 PROCEDURE — 74018 RADEX ABDOMEN 1 VIEW: CPT

## 2024-01-01 PROCEDURE — 999N000065 XR ABDOMEN PORT 1 VIEW

## 2024-01-01 PROCEDURE — 85025 COMPLETE CBC W/AUTO DIFF WBC: CPT | Performed by: EMERGENCY MEDICINE

## 2024-01-01 PROCEDURE — 258N000003 HC RX IP 258 OP 636: Performed by: PHYSICIAN ASSISTANT

## 2024-01-01 PROCEDURE — 99292 CRITICAL CARE ADDL 30 MIN: CPT | Mod: 24 | Performed by: INTERNAL MEDICINE

## 2024-01-01 PROCEDURE — 258N000003 HC RX IP 258 OP 636: Performed by: SURGERY

## 2024-01-01 PROCEDURE — 250N000009 HC RX 250

## 2024-01-01 PROCEDURE — 258N000003 HC RX IP 258 OP 636: Performed by: EMERGENCY MEDICINE

## 2024-01-01 PROCEDURE — 99233 SBSQ HOSP IP/OBS HIGH 50: CPT | Performed by: STUDENT IN AN ORGANIZED HEALTH CARE EDUCATION/TRAINING PROGRAM

## 2024-01-01 PROCEDURE — 99232 SBSQ HOSP IP/OBS MODERATE 35: CPT | Mod: 57 | Performed by: SURGERY

## 2024-01-01 PROCEDURE — 96361 HYDRATE IV INFUSION ADD-ON: CPT

## 2024-01-01 PROCEDURE — 99207 CARDIAC DEVICE CHECK - IN CLINIC: CPT | Performed by: INTERNAL MEDICINE

## 2024-01-01 PROCEDURE — 3E043XZ INTRODUCTION OF VASOPRESSOR INTO CENTRAL VEIN, PERCUTANEOUS APPROACH: ICD-10-PCS | Performed by: STUDENT IN AN ORGANIZED HEALTH CARE EDUCATION/TRAINING PROGRAM

## 2024-01-01 PROCEDURE — 85007 BL SMEAR W/DIFF WBC COUNT: CPT | Performed by: SURGERY

## 2024-01-01 PROCEDURE — 85610 PROTHROMBIN TIME: CPT | Performed by: PHYSICIAN ASSISTANT

## 2024-01-01 PROCEDURE — 83690 ASSAY OF LIPASE: CPT | Performed by: EMERGENCY MEDICINE

## 2024-01-01 PROCEDURE — 200N000001 HC R&B ICU

## 2024-01-01 PROCEDURE — 250N000013 HC RX MED GY IP 250 OP 250 PS 637: Performed by: PHYSICIAN ASSISTANT

## 2024-01-01 PROCEDURE — 93005 ELECTROCARDIOGRAM TRACING: CPT

## 2024-01-01 PROCEDURE — 80048 BASIC METABOLIC PNL TOTAL CA: CPT | Performed by: STUDENT IN AN ORGANIZED HEALTH CARE EDUCATION/TRAINING PROGRAM

## 2024-01-01 PROCEDURE — 250N000013 HC RX MED GY IP 250 OP 250 PS 637: Performed by: SURGERY

## 2024-01-01 PROCEDURE — 81001 URINALYSIS AUTO W/SCOPE: CPT | Performed by: EMERGENCY MEDICINE

## 2024-01-01 PROCEDURE — 250N000011 HC RX IP 250 OP 636: Performed by: ANESTHESIOLOGY

## 2024-01-01 PROCEDURE — 80048 BASIC METABOLIC PNL TOTAL CA: CPT | Performed by: INTERNAL MEDICINE

## 2024-01-01 PROCEDURE — 36415 COLL VENOUS BLD VENIPUNCTURE: CPT | Performed by: STUDENT IN AN ORGANIZED HEALTH CARE EDUCATION/TRAINING PROGRAM

## 2024-01-01 PROCEDURE — 710N000010 HC RECOVERY PHASE 1, LEVEL 2, PER MIN: Performed by: SURGERY

## 2024-01-01 PROCEDURE — 85520 HEPARIN ASSAY: CPT | Performed by: STUDENT IN AN ORGANIZED HEALTH CARE EDUCATION/TRAINING PROGRAM

## 2024-01-01 PROCEDURE — 36415 COLL VENOUS BLD VENIPUNCTURE: CPT | Performed by: INTERNAL MEDICINE

## 2024-01-01 PROCEDURE — 250N000011 HC RX IP 250 OP 636: Performed by: PHYSICIAN ASSISTANT

## 2024-01-01 PROCEDURE — 80048 BASIC METABOLIC PNL TOTAL CA: CPT | Performed by: PHYSICIAN ASSISTANT

## 2024-01-01 PROCEDURE — 85610 PROTHROMBIN TIME: CPT | Performed by: INTERNAL MEDICINE

## 2024-01-01 PROCEDURE — 99222 1ST HOSP IP/OBS MODERATE 55: CPT | Performed by: INTERNAL MEDICINE

## 2024-01-01 PROCEDURE — 83735 ASSAY OF MAGNESIUM: CPT | Performed by: STUDENT IN AN ORGANIZED HEALTH CARE EDUCATION/TRAINING PROGRAM

## 2024-01-01 PROCEDURE — 83605 ASSAY OF LACTIC ACID: CPT | Performed by: STUDENT IN AN ORGANIZED HEALTH CARE EDUCATION/TRAINING PROGRAM

## 2024-01-01 PROCEDURE — 258N000003 HC RX IP 258 OP 636: Performed by: ANESTHESIOLOGY

## 2024-01-01 PROCEDURE — 82040 ASSAY OF SERUM ALBUMIN: CPT | Performed by: INTERNAL MEDICINE

## 2024-01-01 PROCEDURE — 85004 AUTOMATED DIFF WBC COUNT: CPT | Performed by: STUDENT IN AN ORGANIZED HEALTH CARE EDUCATION/TRAINING PROGRAM

## 2024-01-01 PROCEDURE — 82947 ASSAY GLUCOSE BLOOD QUANT: CPT | Performed by: PHYSICIAN ASSISTANT

## 2024-01-01 PROCEDURE — 99213 OFFICE O/P EST LOW 20 MIN: CPT | Mod: 24 | Performed by: PHYSICIAN ASSISTANT

## 2024-01-01 PROCEDURE — 0DQ80ZZ REPAIR SMALL INTESTINE, OPEN APPROACH: ICD-10-PCS | Performed by: SURGERY

## 2024-01-01 PROCEDURE — G0463 HOSPITAL OUTPT CLINIC VISIT: HCPCS | Performed by: PHYSICIAN ASSISTANT

## 2024-01-01 PROCEDURE — P9045 ALBUMIN (HUMAN), 5%, 250 ML: HCPCS | Performed by: INTERNAL MEDICINE

## 2024-01-01 PROCEDURE — 99207 PR APP CREDIT; MD BILLING SHARED VISIT: CPT | Performed by: HOSPITALIST

## 2024-01-01 PROCEDURE — 96376 TX/PRO/DX INJ SAME DRUG ADON: CPT

## 2024-01-01 PROCEDURE — 72100 X-RAY EXAM L-S SPINE 2/3 VWS: CPT | Mod: TC | Performed by: RADIOLOGY

## 2024-01-01 PROCEDURE — 85027 COMPLETE CBC AUTOMATED: CPT | Performed by: INTERNAL MEDICINE

## 2024-01-01 PROCEDURE — 255N000002 HC RX 255 OP 636: Performed by: SURGERY

## 2024-01-01 PROCEDURE — 94002 VENT MGMT INPAT INIT DAY: CPT

## 2024-01-01 PROCEDURE — 0DN80ZZ RELEASE SMALL INTESTINE, OPEN APPROACH: ICD-10-PCS | Performed by: SURGERY

## 2024-01-01 PROCEDURE — 82805 BLOOD GASES W/O2 SATURATION: CPT | Performed by: STUDENT IN AN ORGANIZED HEALTH CARE EDUCATION/TRAINING PROGRAM

## 2024-01-01 PROCEDURE — 83735 ASSAY OF MAGNESIUM: CPT | Performed by: PHYSICIAN ASSISTANT

## 2024-01-01 PROCEDURE — 99232 SBSQ HOSP IP/OBS MODERATE 35: CPT | Performed by: HOSPITALIST

## 2024-01-01 PROCEDURE — 99223 1ST HOSP IP/OBS HIGH 75: CPT | Performed by: PHYSICIAN ASSISTANT

## 2024-01-01 PROCEDURE — 74174 CTA ABD&PLVS W/CONTRAST: CPT

## 2024-01-01 PROCEDURE — 99232 SBSQ HOSP IP/OBS MODERATE 35: CPT | Mod: FS | Performed by: SURGERY

## 2024-01-01 PROCEDURE — 250N000013 HC RX MED GY IP 250 OP 250 PS 637: Performed by: EMERGENCY MEDICINE

## 2024-01-01 PROCEDURE — 96365 THER/PROPH/DIAG IV INF INIT: CPT

## 2024-01-01 PROCEDURE — 99292 CRITICAL CARE ADDL 30 MIN: CPT | Mod: 24 | Performed by: SURGERY

## 2024-01-01 PROCEDURE — 80053 COMPREHEN METABOLIC PANEL: CPT | Performed by: EMERGENCY MEDICINE

## 2024-01-01 PROCEDURE — 999N000141 HC STATISTIC PRE-PROCEDURE NURSING ASSESSMENT: Performed by: SURGERY

## 2024-01-01 PROCEDURE — 87040 BLOOD CULTURE FOR BACTERIA: CPT | Performed by: INTERNAL MEDICINE

## 2024-01-01 PROCEDURE — 44005 FREEING OF BOWEL ADHESION: CPT | Mod: 22 | Performed by: SURGERY

## 2024-01-01 PROCEDURE — 85610 PROTHROMBIN TIME: CPT | Performed by: STUDENT IN AN ORGANIZED HEALTH CARE EDUCATION/TRAINING PROGRAM

## 2024-01-01 PROCEDURE — 5A1935Z RESPIRATORY VENTILATION, LESS THAN 24 CONSECUTIVE HOURS: ICD-10-PCS | Performed by: STUDENT IN AN ORGANIZED HEALTH CARE EDUCATION/TRAINING PROGRAM

## 2024-01-01 PROCEDURE — 250N000011 HC RX IP 250 OP 636: Performed by: STUDENT IN AN ORGANIZED HEALTH CARE EDUCATION/TRAINING PROGRAM

## 2024-01-01 PROCEDURE — 96374 THER/PROPH/DIAG INJ IV PUSH: CPT | Mod: 59

## 2024-01-01 PROCEDURE — C1751 CATH, INF, PER/CENT/MIDLINE: HCPCS | Performed by: SURGERY

## 2024-01-01 PROCEDURE — 85027 COMPLETE CBC AUTOMATED: CPT | Performed by: PHYSICIAN ASSISTANT

## 2024-01-01 DEVICE — CATH CENTRAL LINE MULTI LUMEN 7FRX20CM CDC-45703-XP1A: Type: IMPLANTABLE DEVICE | Site: CHEST  WALL | Status: FUNCTIONAL

## 2024-01-01 RX ORDER — ONDANSETRON 2 MG/ML
4 INJECTION INTRAMUSCULAR; INTRAVENOUS EVERY 6 HOURS PRN
Status: DISCONTINUED | OUTPATIENT
Start: 2024-01-01 | End: 2024-01-01 | Stop reason: HOSPADM

## 2024-01-01 RX ORDER — NALOXONE HYDROCHLORIDE 0.4 MG/ML
0.2 INJECTION, SOLUTION INTRAMUSCULAR; INTRAVENOUS; SUBCUTANEOUS
Status: DISCONTINUED | OUTPATIENT
Start: 2024-01-01 | End: 2024-01-01 | Stop reason: HOSPADM

## 2024-01-01 RX ORDER — NALOXONE HYDROCHLORIDE 0.4 MG/ML
0.4 INJECTION, SOLUTION INTRAMUSCULAR; INTRAVENOUS; SUBCUTANEOUS
Status: DISCONTINUED | OUTPATIENT
Start: 2024-01-01 | End: 2024-01-01 | Stop reason: HOSPADM

## 2024-01-01 RX ORDER — FENTANYL CITRATE 50 UG/ML
50 INJECTION, SOLUTION INTRAMUSCULAR; INTRAVENOUS EVERY 5 MIN PRN
Status: DISCONTINUED | OUTPATIENT
Start: 2024-01-01 | End: 2024-01-01 | Stop reason: HOSPADM

## 2024-01-01 RX ORDER — SODIUM CHLORIDE, SODIUM LACTATE, POTASSIUM CHLORIDE, CALCIUM CHLORIDE 600; 310; 30; 20 MG/100ML; MG/100ML; MG/100ML; MG/100ML
INJECTION, SOLUTION INTRAVENOUS CONTINUOUS
Status: DISCONTINUED | OUTPATIENT
Start: 2024-01-01 | End: 2024-01-01 | Stop reason: HOSPADM

## 2024-01-01 RX ORDER — ONDANSETRON 4 MG/1
4 TABLET, ORALLY DISINTEGRATING ORAL EVERY 6 HOURS PRN
Status: DISCONTINUED | OUTPATIENT
Start: 2024-01-01 | End: 2024-01-01 | Stop reason: HOSPADM

## 2024-01-01 RX ORDER — DEXAMETHASONE SODIUM PHOSPHATE 4 MG/ML
4 INJECTION, SOLUTION INTRA-ARTICULAR; INTRALESIONAL; INTRAMUSCULAR; INTRAVENOUS; SOFT TISSUE
Status: DISCONTINUED | OUTPATIENT
Start: 2024-01-01 | End: 2024-01-01 | Stop reason: HOSPADM

## 2024-01-01 RX ORDER — VASOPRESSIN IN 0.9 % NACL 2 UNIT/2ML
SYRINGE (ML) INTRAVENOUS PRN
Status: DISCONTINUED | OUTPATIENT
Start: 2024-01-01 | End: 2024-01-01

## 2024-01-01 RX ORDER — HYDROMORPHONE HCL IN WATER/PF 6 MG/30 ML
0.4 PATIENT CONTROLLED ANALGESIA SYRINGE INTRAVENOUS
Status: DISCONTINUED | OUTPATIENT
Start: 2024-01-01 | End: 2024-01-01 | Stop reason: HOSPADM

## 2024-01-01 RX ORDER — LORAZEPAM 0.5 MG/1
0.5 TABLET ORAL
Status: DISCONTINUED | OUTPATIENT
Start: 2024-01-01 | End: 2024-01-01 | Stop reason: HOSPADM

## 2024-01-01 RX ORDER — NALOXONE HYDROCHLORIDE 0.4 MG/ML
0.1 INJECTION, SOLUTION INTRAMUSCULAR; INTRAVENOUS; SUBCUTANEOUS
Status: DISCONTINUED | OUTPATIENT
Start: 2024-01-01 | End: 2024-01-01 | Stop reason: HOSPADM

## 2024-01-01 RX ORDER — WARFARIN SODIUM 2 MG/1
2 TABLET ORAL
Status: COMPLETED | OUTPATIENT
Start: 2024-01-01 | End: 2024-01-01

## 2024-01-01 RX ORDER — OXYCODONE HYDROCHLORIDE 5 MG/1
5 TABLET ORAL
Status: DISCONTINUED | OUTPATIENT
Start: 2024-01-01 | End: 2024-01-01 | Stop reason: HOSPADM

## 2024-01-01 RX ORDER — CALCIUM GLUCONATE 20 MG/ML
2 INJECTION, SOLUTION INTRAVENOUS ONCE
Status: COMPLETED | OUTPATIENT
Start: 2024-01-01 | End: 2024-01-01

## 2024-01-01 RX ORDER — HEPARIN SODIUM 10000 [USP'U]/100ML
0-5000 INJECTION, SOLUTION INTRAVENOUS CONTINUOUS
Status: DISCONTINUED | OUTPATIENT
Start: 2024-01-01 | End: 2024-01-01

## 2024-01-01 RX ORDER — LIDOCAINE 40 MG/G
CREAM TOPICAL
Status: DISCONTINUED | OUTPATIENT
Start: 2024-01-01 | End: 2024-01-01 | Stop reason: HOSPADM

## 2024-01-01 RX ORDER — CALCIUM CARBONATE 500 MG/1
1000 TABLET, CHEWABLE ORAL 4 TIMES DAILY PRN
Status: DISCONTINUED | OUTPATIENT
Start: 2024-01-01 | End: 2024-01-01 | Stop reason: HOSPADM

## 2024-01-01 RX ORDER — NOREPINEPHRINE BITARTRATE 0.02 MG/ML
.01-.6 INJECTION, SOLUTION INTRAVENOUS CONTINUOUS
Status: DISCONTINUED | OUTPATIENT
Start: 2024-01-01 | End: 2024-01-01 | Stop reason: HOSPADM

## 2024-01-01 RX ORDER — ACETAMINOPHEN 325 MG/1
975 TABLET ORAL ONCE
Status: COMPLETED | OUTPATIENT
Start: 2024-01-01 | End: 2024-01-01

## 2024-01-01 RX ORDER — ACETAMINOPHEN 325 MG/1
650 TABLET ORAL EVERY 4 HOURS PRN
Status: DISCONTINUED | OUTPATIENT
Start: 2024-01-01 | End: 2024-01-01 | Stop reason: HOSPADM

## 2024-01-01 RX ORDER — CEFAZOLIN SODIUM/WATER 2 G/20 ML
2 SYRINGE (ML) INTRAVENOUS
Status: DISCONTINUED | OUTPATIENT
Start: 2024-01-01 | End: 2024-01-01 | Stop reason: HOSPADM

## 2024-01-01 RX ORDER — PIPERACILLIN SODIUM, TAZOBACTAM SODIUM 4; .5 G/20ML; G/20ML
4.5 INJECTION, POWDER, LYOPHILIZED, FOR SOLUTION INTRAVENOUS ONCE
Status: COMPLETED | OUTPATIENT
Start: 2024-01-01 | End: 2024-01-01

## 2024-01-01 RX ORDER — ONDANSETRON 2 MG/ML
4 INJECTION INTRAMUSCULAR; INTRAVENOUS EVERY 30 MIN PRN
Status: DISCONTINUED | OUTPATIENT
Start: 2024-01-01 | End: 2024-01-01 | Stop reason: HOSPADM

## 2024-01-01 RX ORDER — MORPHINE SULFATE 4 MG/ML
4 INJECTION, SOLUTION INTRAMUSCULAR; INTRAVENOUS ONCE
Status: COMPLETED | OUTPATIENT
Start: 2024-01-01 | End: 2024-01-01

## 2024-01-01 RX ORDER — HEPARIN SODIUM 10000 [USP'U]/100ML
0-5000 INJECTION, SOLUTION INTRAVENOUS CONTINUOUS
Status: DISPENSED | OUTPATIENT
Start: 2024-01-01 | End: 2024-01-01

## 2024-01-01 RX ORDER — PROCHLORPERAZINE MALEATE 5 MG
5 TABLET ORAL EVERY 6 HOURS PRN
Status: DISCONTINUED | OUTPATIENT
Start: 2024-01-01 | End: 2024-01-01 | Stop reason: HOSPADM

## 2024-01-01 RX ORDER — DEXTROSE MONOHYDRATE 100 MG/ML
INJECTION, SOLUTION INTRAVENOUS CONTINUOUS
Status: DISCONTINUED | OUTPATIENT
Start: 2024-01-01 | End: 2024-01-01 | Stop reason: HOSPADM

## 2024-01-01 RX ORDER — MAGNESIUM HYDROXIDE 1200 MG/15ML
LIQUID ORAL PRN
Status: DISCONTINUED | OUTPATIENT
Start: 2024-01-01 | End: 2024-01-01 | Stop reason: HOSPADM

## 2024-01-01 RX ORDER — DEXAMETHASONE SODIUM PHOSPHATE 4 MG/ML
INJECTION, SOLUTION INTRA-ARTICULAR; INTRALESIONAL; INTRAMUSCULAR; INTRAVENOUS; SOFT TISSUE PRN
Status: DISCONTINUED | OUTPATIENT
Start: 2024-01-01 | End: 2024-01-01

## 2024-01-01 RX ORDER — FENTANYL CITRATE 50 UG/ML
INJECTION, SOLUTION INTRAMUSCULAR; INTRAVENOUS PRN
Status: DISCONTINUED | OUTPATIENT
Start: 2024-01-01 | End: 2024-01-01

## 2024-01-01 RX ORDER — ONDANSETRON 2 MG/ML
4 INJECTION INTRAMUSCULAR; INTRAVENOUS EVERY 30 MIN PRN
Status: COMPLETED | OUTPATIENT
Start: 2024-01-01 | End: 2024-01-01

## 2024-01-01 RX ORDER — ONDANSETRON 4 MG/1
4 TABLET, ORALLY DISINTEGRATING ORAL EVERY 30 MIN PRN
Status: DISCONTINUED | OUTPATIENT
Start: 2024-01-01 | End: 2024-01-01 | Stop reason: HOSPADM

## 2024-01-01 RX ORDER — SODIUM CHLORIDE 9 MG/ML
INJECTION, SOLUTION INTRAVENOUS CONTINUOUS
Status: DISCONTINUED | OUTPATIENT
Start: 2024-01-01 | End: 2024-01-01

## 2024-01-01 RX ORDER — ENOXAPARIN SODIUM 100 MG/ML
0.75 INJECTION SUBCUTANEOUS EVERY 12 HOURS
Status: DISCONTINUED | OUTPATIENT
Start: 2024-01-01 | End: 2024-01-01 | Stop reason: HOSPADM

## 2024-01-01 RX ORDER — ONDANSETRON 2 MG/ML
4 INJECTION INTRAMUSCULAR; INTRAVENOUS ONCE
Status: COMPLETED | OUTPATIENT
Start: 2024-01-01 | End: 2024-01-01

## 2024-01-01 RX ORDER — SODIUM CHLORIDE, SODIUM LACTATE, POTASSIUM CHLORIDE, CALCIUM CHLORIDE 600; 310; 30; 20 MG/100ML; MG/100ML; MG/100ML; MG/100ML
INJECTION, SOLUTION INTRAVENOUS CONTINUOUS PRN
Status: DISCONTINUED | OUTPATIENT
Start: 2024-01-01 | End: 2024-01-01

## 2024-01-01 RX ORDER — HYDROMORPHONE HCL IN WATER/PF 6 MG/30 ML
0.4 PATIENT CONTROLLED ANALGESIA SYRINGE INTRAVENOUS EVERY 5 MIN PRN
Status: DISCONTINUED | OUTPATIENT
Start: 2024-01-01 | End: 2024-01-01 | Stop reason: HOSPADM

## 2024-01-01 RX ORDER — ALBUTEROL SULFATE 0.83 MG/ML
2.5 SOLUTION RESPIRATORY (INHALATION)
Status: DISCONTINUED | OUTPATIENT
Start: 2024-01-01 | End: 2024-01-01 | Stop reason: HOSPADM

## 2024-01-01 RX ORDER — MIDAZOLAM HCL IN 0.9 % NACL/PF 1 MG/ML
1-8 PLASTIC BAG, INJECTION (ML) INTRAVENOUS CONTINUOUS
Status: DISCONTINUED | OUTPATIENT
Start: 2024-01-01 | End: 2024-01-01 | Stop reason: HOSPADM

## 2024-01-01 RX ORDER — ROPIVACAINE IN 0.9% SOD CHL/PF 0.1 %
.01-.125 PLASTIC BAG, INJECTION (ML) EPIDURAL CONTINUOUS
Status: DISCONTINUED | OUTPATIENT
Start: 2024-01-01 | End: 2024-01-01

## 2024-01-01 RX ORDER — ACETAMINOPHEN 650 MG/1
650 SUPPOSITORY RECTAL EVERY 4 HOURS PRN
Status: DISCONTINUED | OUTPATIENT
Start: 2024-01-01 | End: 2024-01-01 | Stop reason: HOSPADM

## 2024-01-01 RX ORDER — IOPAMIDOL 755 MG/ML
500 INJECTION, SOLUTION INTRAVASCULAR ONCE
Status: COMPLETED | OUTPATIENT
Start: 2024-01-01 | End: 2024-01-01

## 2024-01-01 RX ORDER — CEFAZOLIN SODIUM/WATER 2 G/20 ML
2 SYRINGE (ML) INTRAVENOUS SEE ADMIN INSTRUCTIONS
Status: DISCONTINUED | OUTPATIENT
Start: 2024-01-01 | End: 2024-01-01 | Stop reason: HOSPADM

## 2024-01-01 RX ORDER — HYDROMORPHONE HCL IN WATER/PF 6 MG/30 ML
0.2 PATIENT CONTROLLED ANALGESIA SYRINGE INTRAVENOUS ONCE
Status: COMPLETED | OUTPATIENT
Start: 2024-01-01 | End: 2024-01-01

## 2024-01-01 RX ORDER — HYDROMORPHONE HCL IN WATER/PF 6 MG/30 ML
0.2 PATIENT CONTROLLED ANALGESIA SYRINGE INTRAVENOUS
Status: DISCONTINUED | OUTPATIENT
Start: 2024-01-01 | End: 2024-01-01

## 2024-01-01 RX ORDER — FENTANYL CITRATE 50 UG/ML
25 INJECTION, SOLUTION INTRAMUSCULAR; INTRAVENOUS EVERY 5 MIN PRN
Status: DISCONTINUED | OUTPATIENT
Start: 2024-01-01 | End: 2024-01-01 | Stop reason: HOSPADM

## 2024-01-01 RX ORDER — ATORVASTATIN CALCIUM 10 MG/1
10 TABLET, FILM COATED ORAL DAILY
Status: DISCONTINUED | OUTPATIENT
Start: 2024-01-01 | End: 2024-01-01 | Stop reason: HOSPADM

## 2024-01-01 RX ORDER — VITAMIN B COMPLEX
25 TABLET ORAL DAILY
COMMUNITY

## 2024-01-01 RX ORDER — PROPOFOL 10 MG/ML
5-75 INJECTION, EMULSION INTRAVENOUS CONTINUOUS
Status: DISCONTINUED | OUTPATIENT
Start: 2024-01-01 | End: 2024-01-01 | Stop reason: HOSPADM

## 2024-01-01 RX ORDER — EPHEDRINE SULFATE 50 MG/ML
INJECTION, SOLUTION INTRAMUSCULAR; INTRAVENOUS; SUBCUTANEOUS PRN
Status: DISCONTINUED | OUTPATIENT
Start: 2024-01-01 | End: 2024-01-01

## 2024-01-01 RX ORDER — ATORVASTATIN CALCIUM 10 MG/1
10 TABLET, FILM COATED ORAL DAILY
COMMUNITY

## 2024-01-01 RX ORDER — HYDROMORPHONE HCL IN WATER/PF 6 MG/30 ML
0.2 PATIENT CONTROLLED ANALGESIA SYRINGE INTRAVENOUS EVERY 5 MIN PRN
Status: DISCONTINUED | OUTPATIENT
Start: 2024-01-01 | End: 2024-01-01 | Stop reason: HOSPADM

## 2024-01-01 RX ORDER — OXYCODONE HYDROCHLORIDE 5 MG/1
10 TABLET ORAL
Status: DISCONTINUED | OUTPATIENT
Start: 2024-01-01 | End: 2024-01-01 | Stop reason: HOSPADM

## 2024-01-01 RX ORDER — HYDROMORPHONE HCL IN WATER/PF 6 MG/30 ML
0.4 PATIENT CONTROLLED ANALGESIA SYRINGE INTRAVENOUS
Status: DISCONTINUED | OUTPATIENT
Start: 2024-01-01 | End: 2024-01-01

## 2024-01-01 RX ORDER — HYDROMORPHONE HCL IN WATER/PF 6 MG/30 ML
0.2 PATIENT CONTROLLED ANALGESIA SYRINGE INTRAVENOUS
Status: DISCONTINUED | OUTPATIENT
Start: 2024-01-01 | End: 2024-01-01 | Stop reason: HOSPADM

## 2024-01-01 RX ORDER — PROPOFOL 10 MG/ML
INJECTION, EMULSION INTRAVENOUS PRN
Status: DISCONTINUED | OUTPATIENT
Start: 2024-01-01 | End: 2024-01-01

## 2024-01-01 RX ORDER — PROCHLORPERAZINE 25 MG
12.5 SUPPOSITORY, RECTAL RECTAL EVERY 12 HOURS PRN
Status: DISCONTINUED | OUTPATIENT
Start: 2024-01-01 | End: 2024-01-01 | Stop reason: HOSPADM

## 2024-01-01 RX ORDER — WARFARIN SODIUM 5 MG/1
5 TABLET ORAL
Status: COMPLETED | OUTPATIENT
Start: 2024-01-01 | End: 2024-01-01

## 2024-01-01 RX ORDER — GLYCOPYRROLATE 0.2 MG/ML
INJECTION, SOLUTION INTRAMUSCULAR; INTRAVENOUS PRN
Status: DISCONTINUED | OUTPATIENT
Start: 2024-01-01 | End: 2024-01-01

## 2024-01-01 RX ORDER — OXYCODONE HYDROCHLORIDE 5 MG/1
5 TABLET ORAL EVERY 4 HOURS PRN
Status: DISCONTINUED | OUTPATIENT
Start: 2024-01-01 | End: 2024-01-01 | Stop reason: HOSPADM

## 2024-01-01 RX ORDER — ENOXAPARIN SODIUM 100 MG/ML
1 INJECTION SUBCUTANEOUS EVERY 12 HOURS
Status: DISCONTINUED | OUTPATIENT
Start: 2024-01-01 | End: 2024-01-01

## 2024-01-01 RX ORDER — LIDOCAINE HYDROCHLORIDE 20 MG/ML
INJECTION, SOLUTION INFILTRATION; PERINEURAL PRN
Status: DISCONTINUED | OUTPATIENT
Start: 2024-01-01 | End: 2024-01-01

## 2024-01-01 RX ORDER — HYDROMORPHONE HYDROCHLORIDE 1 MG/ML
.3-.5 INJECTION, SOLUTION INTRAMUSCULAR; INTRAVENOUS; SUBCUTANEOUS
Status: DISCONTINUED | OUTPATIENT
Start: 2024-01-01 | End: 2024-01-01 | Stop reason: HOSPADM

## 2024-01-01 RX ADMIN — ONDANSETRON 4 MG: 2 INJECTION INTRAMUSCULAR; INTRAVENOUS at 14:40

## 2024-01-01 RX ADMIN — ALBUMIN HUMAN: 0.05 INJECTION, SOLUTION INTRAVENOUS at 12:01

## 2024-01-01 RX ADMIN — Medication 0.5 UNITS: at 12:37

## 2024-01-01 RX ADMIN — SODIUM CHLORIDE, POTASSIUM CHLORIDE, SODIUM LACTATE AND CALCIUM CHLORIDE: 600; 310; 30; 20 INJECTION, SOLUTION INTRAVENOUS at 02:37

## 2024-01-01 RX ADMIN — ENOXAPARIN SODIUM 50 MG: 60 INJECTION SUBCUTANEOUS at 18:57

## 2024-01-01 RX ADMIN — GLYCOPYRROLATE 0.2 MG: 0.2 INJECTION, SOLUTION INTRAMUSCULAR; INTRAVENOUS at 10:04

## 2024-01-01 RX ADMIN — SODIUM CHLORIDE, POTASSIUM CHLORIDE, SODIUM LACTATE AND CALCIUM CHLORIDE: 600; 310; 30; 20 INJECTION, SOLUTION INTRAVENOUS at 05:38

## 2024-01-01 RX ADMIN — HYDROMORPHONE HYDROCHLORIDE 0.2 MG: 0.2 INJECTION, SOLUTION INTRAMUSCULAR; INTRAVENOUS; SUBCUTANEOUS at 12:17

## 2024-01-01 RX ADMIN — SODIUM CHLORIDE, POTASSIUM CHLORIDE, SODIUM LACTATE AND CALCIUM CHLORIDE: 600; 310; 30; 20 INJECTION, SOLUTION INTRAVENOUS at 06:10

## 2024-01-01 RX ADMIN — HYDROMORPHONE HYDROCHLORIDE 0.25 MG: 1 INJECTION, SOLUTION INTRAMUSCULAR; INTRAVENOUS; SUBCUTANEOUS at 10:53

## 2024-01-01 RX ADMIN — HYDROCORTISONE SODIUM SUCCINATE 50 MG: 100 INJECTION, POWDER, FOR SOLUTION INTRAMUSCULAR; INTRAVENOUS at 20:14

## 2024-01-01 RX ADMIN — SODIUM CHLORIDE, POTASSIUM CHLORIDE, SODIUM LACTATE AND CALCIUM CHLORIDE 500 ML: 600; 310; 30; 20 INJECTION, SOLUTION INTRAVENOUS at 17:46

## 2024-01-01 RX ADMIN — ENOXAPARIN SODIUM 50 MG: 60 INJECTION SUBCUTANEOUS at 08:04

## 2024-01-01 RX ADMIN — ALBUMIN HUMAN 25 G: 0.05 INJECTION, SOLUTION INTRAVENOUS at 20:06

## 2024-01-01 RX ADMIN — PROCHLORPERAZINE EDISYLATE 5 MG: 5 INJECTION INTRAMUSCULAR; INTRAVENOUS at 10:56

## 2024-01-01 RX ADMIN — PHENYLEPHRINE HYDROCHLORIDE 300 MCG: 10 INJECTION INTRAVENOUS at 10:16

## 2024-01-01 RX ADMIN — Medication 0.5 UNITS: at 13:14

## 2024-01-01 RX ADMIN — WARFARIN SODIUM 2 MG: 2 TABLET ORAL at 17:11

## 2024-01-01 RX ADMIN — ONDANSETRON 4 MG: 2 INJECTION INTRAMUSCULAR; INTRAVENOUS at 10:18

## 2024-01-01 RX ADMIN — VASOPRESSIN 2.4 UNITS/HR: 20 INJECTION INTRAVENOUS at 02:37

## 2024-01-01 RX ADMIN — NOREPINEPHRINE BITARTRATE 0.04 MCG/KG/MIN: 1 INJECTION, SOLUTION, CONCENTRATE INTRAVENOUS at 13:58

## 2024-01-01 RX ADMIN — DIATRIZOATE MEGLUMINE AND DIATRIZOATE SODIUM 120 ML: 660; 100 SOLUTION ORAL; RECTAL at 09:45

## 2024-01-01 RX ADMIN — PROPOFOL 10 MCG/KG/MIN: 10 INJECTION, EMULSION INTRAVENOUS at 15:23

## 2024-01-01 RX ADMIN — SODIUM CHLORIDE 1000 ML: 9 INJECTION, SOLUTION INTRAVENOUS at 09:17

## 2024-01-01 RX ADMIN — ACETAMINOPHEN 975 MG: 325 TABLET, FILM COATED ORAL at 08:19

## 2024-01-01 RX ADMIN — PANTOPRAZOLE SODIUM 40 MG: 40 INJECTION, POWDER, FOR SOLUTION INTRAVENOUS at 16:51

## 2024-01-01 RX ADMIN — PANTOPRAZOLE SODIUM 40 MG: 40 INJECTION, POWDER, FOR SOLUTION INTRAVENOUS at 13:46

## 2024-01-01 RX ADMIN — PHENYLEPHRINE HYDROCHLORIDE 0.5 MCG/KG/MIN: 10 INJECTION INTRAVENOUS at 11:43

## 2024-01-01 RX ADMIN — ENOXAPARIN SODIUM 50 MG: 60 INJECTION SUBCUTANEOUS at 19:06

## 2024-01-01 RX ADMIN — SODIUM CHLORIDE 80 ML: 9 INJECTION, SOLUTION INTRAVENOUS at 09:36

## 2024-01-01 RX ADMIN — SODIUM CHLORIDE, SODIUM LACTATE, POTASSIUM CHLORIDE, CALCIUM CHLORIDE: 600; 310; 30; 20 INJECTION, SOLUTION INTRAVENOUS at 12:12

## 2024-01-01 RX ADMIN — PHYTONADIONE 5 MG: 10 INJECTION, EMULSION INTRAMUSCULAR; INTRAVENOUS; SUBCUTANEOUS at 10:07

## 2024-01-01 RX ADMIN — ROCURONIUM BROMIDE 20 MG: 50 INJECTION, SOLUTION INTRAVENOUS at 10:49

## 2024-01-01 RX ADMIN — Medication 10 MG: at 13:01

## 2024-01-01 RX ADMIN — SODIUM CHLORIDE: 9 INJECTION, SOLUTION INTRAVENOUS at 14:18

## 2024-01-01 RX ADMIN — SODIUM CHLORIDE, POTASSIUM CHLORIDE, SODIUM LACTATE AND CALCIUM CHLORIDE: 600; 310; 30; 20 INJECTION, SOLUTION INTRAVENOUS at 11:28

## 2024-01-01 RX ADMIN — ROCURONIUM BROMIDE 10 MG: 50 INJECTION, SOLUTION INTRAVENOUS at 10:04

## 2024-01-01 RX ADMIN — Medication 10 MG: at 20:34

## 2024-01-01 RX ADMIN — HEPARIN SODIUM 750 UNITS/HR: 10000 INJECTION, SOLUTION INTRAVENOUS at 19:15

## 2024-01-01 RX ADMIN — ONDANSETRON 4 MG: 2 INJECTION INTRAMUSCULAR; INTRAVENOUS at 16:44

## 2024-01-01 RX ADMIN — FENTANYL CITRATE 100 MCG: 50 INJECTION INTRAMUSCULAR; INTRAVENOUS at 10:04

## 2024-01-01 RX ADMIN — ACETAMINOPHEN 650 MG: 325 TABLET, FILM COATED ORAL at 05:20

## 2024-01-01 RX ADMIN — LIDOCAINE HYDROCHLORIDE 30 MG: 20 INJECTION, SOLUTION INFILTRATION; PERINEURAL at 10:04

## 2024-01-01 RX ADMIN — Medication 1 UNITS: at 12:59

## 2024-01-01 RX ADMIN — ONDANSETRON 4 MG: 4 TABLET, ORALLY DISINTEGRATING ORAL at 18:23

## 2024-01-01 RX ADMIN — DEXTROSE MONOHYDRATE: 100 INJECTION, SOLUTION INTRAVENOUS at 05:00

## 2024-01-01 RX ADMIN — PANTOPRAZOLE SODIUM 40 MG: 40 INJECTION, POWDER, FOR SOLUTION INTRAVENOUS at 14:29

## 2024-01-01 RX ADMIN — Medication 5 MG: at 11:46

## 2024-01-01 RX ADMIN — Medication 12.5 MG: at 13:15

## 2024-01-01 RX ADMIN — WATER 150 ML: 100 IRRIGANT IRRIGATION at 10:11

## 2024-01-01 RX ADMIN — PHENYLEPHRINE HYDROCHLORIDE 100 MCG: 10 INJECTION INTRAVENOUS at 11:19

## 2024-01-01 RX ADMIN — SODIUM CHLORIDE, POTASSIUM CHLORIDE, SODIUM LACTATE AND CALCIUM CHLORIDE: 600; 310; 30; 20 INJECTION, SOLUTION INTRAVENOUS at 09:58

## 2024-01-01 RX ADMIN — CALCIUM GLUCONATE 2 G: 20 INJECTION, SOLUTION INTRAVENOUS at 23:28

## 2024-01-01 RX ADMIN — HYDROMORPHONE HYDROCHLORIDE 0.2 MG: 0.2 INJECTION, SOLUTION INTRAMUSCULAR; INTRAVENOUS; SUBCUTANEOUS at 07:34

## 2024-01-01 RX ADMIN — OXYCODONE HYDROCHLORIDE 5 MG: 5 TABLET ORAL at 19:35

## 2024-01-01 RX ADMIN — NOREPINEPHRINE BITARTRATE 0.04 MCG/KG/MIN: 0.02 INJECTION, SOLUTION INTRAVENOUS at 15:02

## 2024-01-01 RX ADMIN — HYDROMORPHONE HYDROCHLORIDE 0.2 MG: 0.2 INJECTION, SOLUTION INTRAMUSCULAR; INTRAVENOUS; SUBCUTANEOUS at 16:46

## 2024-01-01 RX ADMIN — HYDROCORTISONE SODIUM SUCCINATE 50 MG: 100 INJECTION, POWDER, FOR SOLUTION INTRAMUSCULAR; INTRAVENOUS at 02:06

## 2024-01-01 RX ADMIN — Medication 5 MG: at 12:10

## 2024-01-01 RX ADMIN — SODIUM CHLORIDE, POTASSIUM CHLORIDE, SODIUM LACTATE AND CALCIUM CHLORIDE 500 ML: 600; 310; 30; 20 INJECTION, SOLUTION INTRAVENOUS at 15:26

## 2024-01-01 RX ADMIN — HYDROMORPHONE HYDROCHLORIDE 0.75 MG: 1 INJECTION, SOLUTION INTRAMUSCULAR; INTRAVENOUS; SUBCUTANEOUS at 12:02

## 2024-01-01 RX ADMIN — SODIUM CHLORIDE, POTASSIUM CHLORIDE, SODIUM LACTATE AND CALCIUM CHLORIDE: 600; 310; 30; 20 INJECTION, SOLUTION INTRAVENOUS at 17:10

## 2024-01-01 RX ADMIN — WARFARIN SODIUM 5 MG: 5 TABLET ORAL at 18:23

## 2024-01-01 RX ADMIN — ROCURONIUM BROMIDE 20 MG: 50 INJECTION, SOLUTION INTRAVENOUS at 10:13

## 2024-01-01 RX ADMIN — SODIUM CHLORIDE, POTASSIUM CHLORIDE, SODIUM LACTATE AND CALCIUM CHLORIDE: 600; 310; 30; 20 INJECTION, SOLUTION INTRAVENOUS at 11:02

## 2024-01-01 RX ADMIN — DEXAMETHASONE SODIUM PHOSPHATE 4 MG: 4 INJECTION, SOLUTION INTRA-ARTICULAR; INTRALESIONAL; INTRAMUSCULAR; INTRAVENOUS; SOFT TISSUE at 10:04

## 2024-01-01 RX ADMIN — SODIUM CHLORIDE, POTASSIUM CHLORIDE, SODIUM LACTATE AND CALCIUM CHLORIDE: 600; 310; 30; 20 INJECTION, SOLUTION INTRAVENOUS at 13:46

## 2024-01-01 RX ADMIN — HYDROMORPHONE HYDROCHLORIDE 0.2 MG: 0.2 INJECTION, SOLUTION INTRAMUSCULAR; INTRAVENOUS; SUBCUTANEOUS at 14:27

## 2024-01-01 RX ADMIN — Medication 2 G: at 09:57

## 2024-01-01 RX ADMIN — ONDANSETRON 4 MG: 2 INJECTION INTRAMUSCULAR; INTRAVENOUS at 03:17

## 2024-01-01 RX ADMIN — PIPERACILLIN AND TAZOBACTAM 4.5 G: 4; .5 INJECTION, POWDER, FOR SOLUTION INTRAVENOUS at 10:18

## 2024-01-01 RX ADMIN — SODIUM CHLORIDE, POTASSIUM CHLORIDE, SODIUM LACTATE AND CALCIUM CHLORIDE: 600; 310; 30; 20 INJECTION, SOLUTION INTRAVENOUS at 13:00

## 2024-01-01 RX ADMIN — SODIUM BICARBONATE 50 MEQ: 84 INJECTION INTRAVENOUS at 06:17

## 2024-01-01 RX ADMIN — Medication 12.5 MG: at 12:54

## 2024-01-01 RX ADMIN — WATER 150 ML: 100 IRRIGANT IRRIGATION at 16:38

## 2024-01-01 RX ADMIN — SODIUM CHLORIDE, SODIUM LACTATE, POTASSIUM CHLORIDE, CALCIUM CHLORIDE: 600; 310; 30; 20 INJECTION, SOLUTION INTRAVENOUS at 10:12

## 2024-01-01 RX ADMIN — PHENYLEPHRINE HYDROCHLORIDE 100 MCG: 10 INJECTION INTRAVENOUS at 11:36

## 2024-01-01 RX ADMIN — SODIUM CHLORIDE 57 ML: 9 INJECTION, SOLUTION INTRAVENOUS at 03:46

## 2024-01-01 RX ADMIN — MORPHINE SULFATE 4 MG: 4 INJECTION, SOLUTION INTRAMUSCULAR; INTRAVENOUS at 03:17

## 2024-01-01 RX ADMIN — ACETAMINOPHEN 650 MG: 325 TABLET, FILM COATED ORAL at 00:38

## 2024-01-01 RX ADMIN — NOREPINEPHRINE BITARTRATE 0.04 MCG/KG/MIN: 1 INJECTION, SOLUTION, CONCENTRATE INTRAVENOUS at 13:09

## 2024-01-01 RX ADMIN — ATORVASTATIN CALCIUM 10 MG: 10 TABLET, FILM COATED ORAL at 08:03

## 2024-01-01 RX ADMIN — PANTOPRAZOLE SODIUM 40 MG: 40 INJECTION, POWDER, FOR SOLUTION INTRAVENOUS at 13:18

## 2024-01-01 RX ADMIN — NOREPINEPHRINE BITARTRATE 0.3 MCG/KG/MIN: 0.02 INJECTION, SOLUTION INTRAVENOUS at 19:03

## 2024-01-01 RX ADMIN — SODIUM CHLORIDE, POTASSIUM CHLORIDE, SODIUM LACTATE AND CALCIUM CHLORIDE: 600; 310; 30; 20 INJECTION, SOLUTION INTRAVENOUS at 15:24

## 2024-01-01 RX ADMIN — IOPAMIDOL 72 ML: 755 INJECTION, SOLUTION INTRAVENOUS at 09:36

## 2024-01-01 RX ADMIN — SODIUM CHLORIDE 1000 ML: 9 INJECTION, SOLUTION INTRAVENOUS at 18:45

## 2024-01-01 RX ADMIN — PROPOFOL 125 MG: 10 INJECTION, EMULSION INTRAVENOUS at 10:04

## 2024-01-01 RX ADMIN — NOREPINEPHRINE BITARTRATE 0.04 MCG/KG/MIN: 0.02 INJECTION, SOLUTION INTRAVENOUS at 15:07

## 2024-01-01 RX ADMIN — ENOXAPARIN SODIUM 50 MG: 60 INJECTION SUBCUTANEOUS at 06:48

## 2024-01-01 RX ADMIN — VASOPRESSIN 2.4 UNITS/HR: 20 INJECTION INTRAVENOUS at 19:45

## 2024-01-01 RX ADMIN — ONDANSETRON 4 MG: 2 INJECTION INTRAMUSCULAR; INTRAVENOUS at 07:34

## 2024-01-01 RX ADMIN — Medication 5 MG: at 13:08

## 2024-01-01 RX ADMIN — ALBUMIN HUMAN: 0.05 INJECTION, SOLUTION INTRAVENOUS at 11:26

## 2024-01-01 RX ADMIN — IOPAMIDOL 68 ML: 755 INJECTION, SOLUTION INTRAVENOUS at 03:45

## 2024-01-01 RX ADMIN — MIDAZOLAM HYDROCHLORIDE 1 MG/HR: 1 INJECTION, SOLUTION INTRAVENOUS at 20:23

## 2024-01-01 RX ADMIN — SODIUM CHLORIDE 1000 ML: 9 INJECTION, SOLUTION INTRAVENOUS at 03:17

## 2024-01-01 RX ADMIN — HYDROMORPHONE HYDROCHLORIDE 0.2 MG: 0.2 INJECTION, SOLUTION INTRAMUSCULAR; INTRAVENOUS; SUBCUTANEOUS at 10:18

## 2024-01-01 RX ADMIN — SODIUM BICARBONATE 50 MEQ: 84 INJECTION INTRAVENOUS at 23:34

## 2024-01-01 RX ADMIN — SODIUM CHLORIDE, POTASSIUM CHLORIDE, SODIUM LACTATE AND CALCIUM CHLORIDE: 600; 310; 30; 20 INJECTION, SOLUTION INTRAVENOUS at 01:26

## 2024-01-01 RX ADMIN — OXYCODONE HYDROCHLORIDE 5 MG: 5 TABLET ORAL at 03:31

## 2024-01-01 ASSESSMENT — ACTIVITIES OF DAILY LIVING (ADL)
ADLS_ACUITY_SCORE: 38
ADLS_ACUITY_SCORE: 44
WERE_AUXILIARY_AIDS_OFFERED?: YES
ADLS_ACUITY_SCORE: 38
WALKING_OR_CLIMBING_STAIRS: AMBULATION DIFFICULTY, REQUIRES EQUIPMENT
ADLS_ACUITY_SCORE: 52
ADLS_ACUITY_SCORE: 43
FALL_HISTORY_WITHIN_LAST_SIX_MONTHS: NO
ADLS_ACUITY_SCORE: 38
ADLS_ACUITY_SCORE: 44
DRESSING/BATHING_DIFFICULTY: NO
ADLS_ACUITY_SCORE: 52
ADLS_ACUITY_SCORE: 50
ADLS_ACUITY_SCORE: 44
ADLS_ACUITY_SCORE: 43
CONCENTRATING,_REMEMBERING_OR_MAKING_DECISIONS_DIFFICULTY: NO
ADLS_ACUITY_SCORE: 41
ADLS_ACUITY_SCORE: 43
ADLS_ACUITY_SCORE: 38
TOILETING_ASSISTANCE: TOILETING DIFFICULTY, REQUIRES EQUIPMENT
ADLS_ACUITY_SCORE: 44
ADLS_ACUITY_SCORE: 50
ADLS_ACUITY_SCORE: 43
ADLS_ACUITY_SCORE: 44
TOILETING_ISSUES: YES
ADLS_ACUITY_SCORE: 43
ADLS_ACUITY_SCORE: 41
ADLS_ACUITY_SCORE: 44
EQUIPMENT_CURRENTLY_USED_AT_HOME: WALKER, ROLLING
ADLS_ACUITY_SCORE: 43
ADLS_ACUITY_SCORE: 44
ADLS_ACUITY_SCORE: 38
ADLS_ACUITY_SCORE: 38
ADLS_ACUITY_SCORE: 50
ADLS_ACUITY_SCORE: 43
ADLS_ACUITY_SCORE: 41
ADLS_ACUITY_SCORE: 52
ADLS_ACUITY_SCORE: 43
ADLS_ACUITY_SCORE: 43
HEARING_DIFFICULTY_OR_DEAF: YES
ADLS_ACUITY_SCORE: 50
ADLS_ACUITY_SCORE: 39
THE_FOLLOWING_AIDS_WERE_PROVIDED;: PATIENT DECLINED OFFER OF AUXILIARY AIDS
ADLS_ACUITY_SCORE: 44
ADLS_ACUITY_SCORE: 43
ADLS_ACUITY_SCORE: 44
DOING_ERRANDS_INDEPENDENTLY_DIFFICULTY: YES
ADLS_ACUITY_SCORE: 43
ADLS_ACUITY_SCORE: 44
ADLS_ACUITY_SCORE: 43
WALKING_OR_CLIMBING_STAIRS_DIFFICULTY: YES
CHANGE_IN_FUNCTIONAL_STATUS_SINCE_ONSET_OF_CURRENT_ILLNESS/INJURY: NO
ADLS_ACUITY_SCORE: 50
ADLS_ACUITY_SCORE: 43
ADLS_ACUITY_SCORE: 44
ADLS_ACUITY_SCORE: 44
ADLS_ACUITY_SCORE: 52
ADLS_ACUITY_SCORE: 43
WEAR_GLASSES_OR_BLIND: NO
ADLS_ACUITY_SCORE: 39
ADLS_ACUITY_SCORE: 44
ADLS_ACUITY_SCORE: 42
ADLS_ACUITY_SCORE: 50
ADLS_ACUITY_SCORE: 44
ADLS_ACUITY_SCORE: 43
ADLS_ACUITY_SCORE: 43
ADLS_ACUITY_SCORE: 42
DESCRIBE_HEARING_LOSS: BILATERAL HEARING LOSS
PATIENT'S_PREFERRED_MEANS_OF_COMMUNICATION: VERBAL
ADLS_ACUITY_SCORE: 43
ADLS_ACUITY_SCORE: 48
ADLS_ACUITY_SCORE: 43
ADLS_ACUITY_SCORE: 44
ADLS_ACUITY_SCORE: 43
ADLS_ACUITY_SCORE: 44
TOILETING: 1-->ASSISTANCE (EQUIPMENT/PERSON) NEEDED
ADLS_ACUITY_SCORE: 44
ADLS_ACUITY_SCORE: 52
ADLS_ACUITY_SCORE: 52
ADLS_ACUITY_SCORE: 43
DIFFICULTY_EATING/SWALLOWING: NO
ADLS_ACUITY_SCORE: 42
ADLS_ACUITY_SCORE: 44
ADLS_ACUITY_SCORE: 43
ADLS_ACUITY_SCORE: 44
TOILETING: 0-->NOT TOILET TRAINED OR ASSISTANCE NEEDED (DEVELOPMENTALLY APPROPRIATE)
ADLS_ACUITY_SCORE: 43
ADLS_ACUITY_SCORE: 43
ADLS_ACUITY_SCORE: 38
ADLS_ACUITY_SCORE: 43
ADLS_ACUITY_SCORE: 44
ADLS_ACUITY_SCORE: 52
ADLS_ACUITY_SCORE: 38
ADLS_ACUITY_SCORE: 44
ADLS_ACUITY_SCORE: 44
DIFFICULTY_COMMUNICATING: NO
ADLS_ACUITY_SCORE: 43

## 2024-01-01 ASSESSMENT — COLUMBIA-SUICIDE SEVERITY RATING SCALE - C-SSRS
6. HAVE YOU EVER DONE ANYTHING, STARTED TO DO ANYTHING, OR PREPARED TO DO ANYTHING TO END YOUR LIFE?: NO
1. IN THE PAST MONTH, HAVE YOU WISHED YOU WERE DEAD OR WISHED YOU COULD GO TO SLEEP AND NOT WAKE UP?: NO
2. HAVE YOU ACTUALLY HAD ANY THOUGHTS OF KILLING YOURSELF IN THE PAST MONTH?: NO

## 2024-01-01 ASSESSMENT — PAIN SCALES - GENERAL: PAINLEVEL: MODERATE PAIN (4)

## 2024-01-01 ASSESSMENT — LIFESTYLE VARIABLES: TOBACCO_USE: 1

## 2024-01-12 NOTE — NURSING NOTE
Salvador Costello is a 88 year old male who presents for:  Chief Complaint   Patient presents with    RECHECK     hospital follow up - Compression fracture of T12 vertebra        Initial Vitals:  /88   Pulse 89   Ht 6' (1.829 m)   Wt 135 lb 6.4 oz (61.4 kg)   SpO2 98%   BMI 18.36 kg/m   Estimated body mass index is 18.36 kg/m  as calculated from the following:    Height as of this encounter: 6' (1.829 m).    Weight as of this encounter: 135 lb 6.4 oz (61.4 kg).. Body surface area is 1.77 meters squared. BP completed using cuff size: regular  Moderate Pain (4)        Babak Lagunas

## 2024-01-12 NOTE — LETTER
1/12/2024         RE: Salvador Costello  [address]  [facility] [id number]        Dear Colleague,    Thank you for referring your patient, Salvador Costello, to the [facility] [place]. Please see a copy of my visit note below.    Neurosurgery follow-up    Mr. Costello is an 88-year-old male who had a fall 3 months ago was found to have T12 compression fracture.  Today states he has minimal back pain.  Denies any radicular symptoms.  He has been going for short frequent walks throughout the day.  Symptoms after his been walking for some time he gets some back soreness.  But it resolved after he rests.  He has not been wearing a brace.  He does not want to do any physical therapy.    Exam    B/P: 136/88, T: Data Unavailable, P: 89, R: Data Unavailable     Alert and oriented no acute distress  Bilateral lower extremities appropriate strength  Gait is normal  Mild tenderness in the midthoracic region to palpation.    Imaging    Severe compression of the T12 vertebrae similar to prior imaging 6 weeks ago.    Assessment    T12 compression fracture      Plan    Patient can follow-up as needed, activities as tolerated.  No further imaging is needed at this time.          Again, thank you for allowing me to participate in the care of your patient.        Sincerely,        Albania Dawkins PA-C

## 2024-01-12 NOTE — PROGRESS NOTES
Neurosurgery follow-up    Mr. Costello is an 88-year-old male who had a fall 3 months ago was found to have T12 compression fracture.  Today states he has minimal back pain.  Denies any radicular symptoms.  He has been going for short frequent walks throughout the day.  Symptoms after his been walking for some time he gets some back soreness.  But it resolved after he rests.  He has not been wearing a brace.  He does not want to do any physical therapy.    Exam    B/P: 136/88, T: Data Unavailable, P: 89, R: Data Unavailable     Alert and oriented no acute distress  Bilateral lower extremities appropriate strength  Gait is normal  Mild tenderness in the midthoracic region to palpation.    Imaging    Severe compression of the T12 vertebrae similar to prior imaging 6 weeks ago.    Assessment    T12 compression fracture      Plan    Patient can follow-up as needed, activities as tolerated.  No further imaging is needed at this time.

## 2024-02-19 PROBLEM — K56.609 SBO (SMALL BOWEL OBSTRUCTION) (H): Status: ACTIVE | Noted: 2024-01-01

## 2024-02-19 NOTE — PHARMACY-ADMISSION MEDICATION HISTORY
Pharmacist Admission Medication History    Admission medication history is complete. The information provided in this note is only as accurate as the sources available at the time of the update.    Information Source(s): Patient via in-person    Pertinent Information: -    Changes made to PTA medication list:  Added: warfarin dose  Deleted: meclizine  Changed: None    Allergies reviewed with patient and updates made in EHR: yes    Medication History Completed By: Pinky Narayan RP 2/19/2024 10:03 AM    Prior to Admission medications    Medication Sig Last Dose Taking? Auth Provider Long Term End Date   atorvastatin (LIPITOR) 10 MG tablet Take 10 mg by mouth daily 2/17/2024 Yes Reported, Patient Yes    Warfarin Sodium (COUMADIN PO) Take by mouth daily Takes 2.5 mg Monday, 5 mg all other days of the week 2/17/2024 Yes Reported, Patient

## 2024-02-19 NOTE — ED NOTES
[facility]  ED Nurse Handoff Report    ED Chief complaint: Abdominal Pain  . ED Diagnosis:   Final diagnoses:   None       Allergies: No Known Allergies    Code Status: Full Code    Activity level - Baseline/Home:  standby.  Activity Level - Current:   assist of 1.   Lift room needed: No.   Bariatric: No   Needed: No   Isolation: No.   Infection: Not Applicable.     Respiratory status: Room air    Vital Signs (within 30 minutes):   Vitals:    02/19/24 0327 02/19/24 0330 02/19/24 0335 02/19/24 0420   BP:  137/70     Pulse:  76     Resp:       Temp:       TempSrc:       SpO2: 96% 97% 98% 100%       Cardiac Rhythm:  ,   Cardiac  Cardiac Rhythm: Atrial fibrillation (baseline on warfarin)  Pain level:    Patient confused: No.   Patient Falls Risk: patient and family education.   Elimination Status: Has voided     Patient Report - Initial Complaint: Abd pain.   Focused Assessment:    Chief Complaint:  Abdominal Pain        The history is provided by the patient and the spouse.      Salvador Costello is a 88 year old male with a history colon cancer, who presents to the ED for lower abdominal pain that began last night at approximately 1700. Reports accompanying nausea. Denies vomiting. Reports history of a colectomy many years ago.      Abnormal Results:   Labs Ordered and Resulted from Time of ED Arrival to Time of ED Departure   COMPREHENSIVE METABOLIC PANEL - Abnormal       Result Value    Sodium 138      Potassium 4.2      Carbon Dioxide (CO2) 25      Anion Gap 13      Urea Nitrogen 21.4      Creatinine 0.95      GFR Estimate 77      Calcium 9.5      Chloride 100      Glucose 169 (*)     Alkaline Phosphatase 74      AST 40      ALT 23      Protein Total 7.3      Albumin 4.4      Bilirubin Total 1.2     CBC WITH PLATELETS AND DIFFERENTIAL - Abnormal    WBC Count 9.8      RBC Count 4.82      Hemoglobin 14.0      Hematocrit 42.9      MCV 89      MCH 29.0      MCHC 32.6      RDW 14.6       Platelet Count 240      % Neutrophils 93      % Lymphocytes 4      % Monocytes 3      % Eosinophils 0      % Basophils 0      % Immature Granulocytes 0      NRBCs per 100 WBC 0      Absolute Neutrophils 9.1 (*)     Absolute Lymphocytes 0.4 (*)     Absolute Monocytes 0.3      Absolute Eosinophils 0.0      Absolute Basophils 0.0      Absolute Immature Granulocytes 0.0      Absolute NRBCs 0.0     INR - Abnormal    INR 2.32 (*)    LACTIC ACID WHOLE BLOOD - Abnormal    Lactic Acid 2.1 (*)    LIPASE - Normal    Lipase 27     LACTIC ACID WHOLE BLOOD        Abd/pelvis CT,  IV  contrast only TRAUMA / AAA   Final Result   IMPRESSION:    1.  Small bowel obstruction.   2.  Tiny nonobstructing left renal calculus.          Treatments provided: See MAR. Frequent monitoring of pt.  Family Comments: Daughter was present at bedside. Went home. Involved and supportive in pt plan of care.  OBS brochure/video discussed/provided to patient:  No  ED Medications:   Medications   sodium chloride 0.9% BOLUS 1,000 mL (1,000 mLs Intravenous $New Bag 2/19/24 0317)   ondansetron (ZOFRAN) injection 4 mg (4 mg Intravenous $Given 2/19/24 0317)   morphine (PF) injection 4 mg (4 mg Intravenous $Given 2/19/24 0317)   iopamidol (ISOVUE-370) solution 500 mL (68 mLs Intravenous $Given 2/19/24 0345)   for CT scan flush use (57 mLs Intravenous $Given 2/19/24 0346)       Drips infusing:  No  For the majority of the shift this patient was Green.   Interventions performed were N/a.    Sepsis treatment initiated: No    Cares/treatment/interventions/medications to be completed following ED care: N/a    ED Nurse Name: Vangie Chambers RN  4:20 AM    RECEIVING UNIT ED HANDOFF REVIEW    Above ED Nurse Handoff Report was reviewed: Yes  Reviewed by: Steph Lange RN on February 19, 2024 at 5:32 AM

## 2024-02-19 NOTE — ED TRIAGE NOTES
Pt BIBA with abdominal pain and nausea that started at 1700 2/18 after trying to eat a bite of pizza.  He is not vomiting, but is dry heaving. Reports weakness as well.

## 2024-02-19 NOTE — ED NOTES
Bed: ED01  Expected date:   Expected time:   Means of arrival:   Comments:  A luanay pt when clean

## 2024-02-19 NOTE — CONSULTS
General Surgery Consultation    Salvador Costello MRN# [id number]   Age: 88 year old YOB: 1935     Date of Admission:  2/19/2024    Reason for consult:            Small bowel obstruction       Requesting physician:            MD Iván                Assessment and Plan:   Assessment:   Salvador Costello is a 88 year old male with history of colon cancer s/p hemicolectomy in the 90s, now admitted with a small bowel obstruction.    Comorbidities:   has a past medical history of Colon cancer (1990), Complete heart block (H) (10/2023), H/O mechanical aortic valve replacement (2007), H/O mitral valve replacement with mechanical valve (2007), Hyperlipidemia, and Osteopenia.      Plan:   Continue conservative management with bowel rest, npo, IVF. As patient is not currently nauseaous and is having no to minimal pain will hold off on NG placement.   Gastografin challenge today - ending XR to be around 6pm  Surgical intervention may be needed if the clinical picture worsens or if the patient fails to progress with conservative measure.               Chief Complaint:   Small bowel obstruction    History is obtained from the patient         History of Present Illness:   Salvador Costello is a 88 year old  male who presents with abdominal pain diffusely for the past 1 days.  The pain is intermittent and dull and cramping.  Associated symptoms include with nausea and vomiting.  Positive for associated symptoms of nausea, vomiting, and bloating.             Past Medical History:     Past Medical History:   Diagnosis Date    Colon cancer 1990    S/p hemicolectomy    Complete heart block (H) 10/2023    S/p PPM    H/O mechanical aortic valve replacement 2007    H/O mitral valve replacement with mechanical valve 2007    Hyperlipidemia     Osteopenia              Past Surgical History:     Past Surgical History:   Procedure Laterality Date    Aortic valve replacement NOS      Colon cancer resection      COLONOSCOPY N/A  10/21/2014    Procedure: COLONOSCOPY;  Surgeon: Brett Reece MD;  Location:  GI    EP PACEMAKER DEVICE & LEAD IMPLANT- RIGHT ATRIAL & RIGHT VENTRICULAR N/A 10/27/2023    Procedure: Pacemaker Device & Lead Implant - Right Atrial & Right Ventricular;  Surgeon: Santosh Sanchez MD;  Location:  HEART CARDIAC CATH LAB    HERNIA REPAIR      Mitral valve replacement NOS               Social History:     Social History     Tobacco Use    Smoking status: Former    Smokeless tobacco: Not on file   Substance Use Topics    Alcohol use: Yes     Alcohol/week: 2.5 standard drinks of alcohol     Types: 3 Cans of beer per week     Comment: a beer a day-has not has one for 1 month             Family History:   No family history on file.         Allergies:   No Known Allergies          Medications:   No current facility-administered medications on file prior to encounter.  atorvastatin (LIPITOR) 10 MG tablet, Take 10 mg by mouth daily  meclizine (ANTIVERT) 12.5 MG tablet, Take 1 tablet (12.5 mg) by mouth 4 times daily as needed for dizziness  Warfarin Sodium (COUMADIN PO), Take  by mouth.         [Held by provider] atorvastatin  10 mg Oral Daily    sodium chloride (PF)  3 mL Intracatheter Q8H    Warfarin Therapy Reminder  1 each Oral See Admin Instructions            Review of Systems:   The 10 point review of systems is negative other than noted in the HPI.          Physical Exam:   BP (!) 149/75 (BP Location: Left arm)   Pulse 82   Temp 98.4  F (36.9  C) (Oral)   Resp 16   Ht 1.829 m (6')   Wt 60.6 kg (133 lb 9.6 oz)   SpO2 97%   BMI 18.12 kg/m    General - Well developed, well nourished male in no apparent distress  Eyes:  no scleral icterus or redness  Lymphatic: No cervical, or supraclavicular lymphadenopathy  Lungs: Clear to auscultation bilaterally  Heart: regular rate and rhythm, no murmurs  Abdomen:soft, flat, distended with no tenderness noted diffusely.  No rebound or guarding.  no masses palpated.  MSK:  Extremities warm without edema  Neurologic: nonfocal  Psychiatric: Mood and affect appropriate  Skin: Without lesions, rashes, or jaundice         Data:   Labs reviewed      Imaging:  All imaging studies reviewed by me and my interpretation of the Ct abd/pelvis is: small bowel obstruction with dilated loops of small bowel and decompressed distal loops      Krishan Alvarado MD  2/19/2024 9:06 AM     Time spent with the patient, reviewing the EMR, reviewing laboratory and imaging studies,   counseling and coordinating care:  69 minutes.

## 2024-02-19 NOTE — PROGRESS NOTES
See H&P from this AM from Dr. Minor.      88 yom with history of colon cancer s/p hemicolectomy in early 90's who presents with nausea/vomiting and abdominal pain.  CT scan with evidence of SBO.  He has complete heart block with PPM in place and mechanical mitral and aortic valves on warfarin for a/c with goal INR 2-3 per chart review.    Pt notes he is feeling a bit better.  Not passing gas yet. He has intermittent nausea but no vomiting. Gastrografin study started this AM.    Vitals:    02/19/24 0556 02/19/24 0752 02/19/24 0805 02/19/24 1121   BP: 133/62 (!) 149/75  (!) 143/72   BP Location: Left arm Left arm  Left arm   Pulse: 85 82  79   Resp: 16 16 16 16   Temp: 98  F (36.7  C) 98.4  F (36.9  C)  98.5  F (36.9  C)   TempSrc: Oral Oral  Oral   SpO2: 95% 97%  97%   Weight: 60.6 kg (133 lb 9.6 oz)      Height: 1.829 m (6')        On exam, pt is sitting in chair. Comfortable. Abdomen is soft with mild diffuse tenderness but no rebound or guarding. BS not appreciate. Ext WWP. Neuro exam nonfocal.  He answers appropriately and has no tremor.    Plan:  -General surgery consulted. Gastrografin study. IVF. Prn pain meds available.  -Continue warfarin for a/c with pharm to dose.     I did call daughter Guillermina at number listed but there was no answer.     Nehemiah Mullins MD

## 2024-02-19 NOTE — ED PROVIDER NOTES
History     Chief Complaint:  Abdominal Pain     The history is provided by the patient and the spouse.      Salvador Costello is a 88 year old male with a history colon cancer, who presents to the ED for lower abdominal pain that began last night at approximately 1700. Reports accompanying nausea. Denies vomiting. Reports history of a colectomy many years ago.     Independent Historian:   No independent historian - patient only    Review of External Notes:   Care everywhere reviewed and epic updated    Medications:    atorvastatin (LIPITOR) 10 MG tablet  meclizine (ANTIVERT) 12.5 MG tablet  Warfarin Sodium (COUMADIN PO)      Past Medical History:    Past Medical History:   Diagnosis Date    Colon cancer     Hyperlipidemia     Osteopenia      Past Surgical History:    Past Surgical History:   Procedure Laterality Date    Aortic valve replacement NOS      Colon cancer resection      COLONOSCOPY N/A 10/21/2014    Procedure: COLONOSCOPY;  Surgeon: Brett Reece MD;  Location: [facility] PACEMAKER DEVICE & LEAD IMPLANT- RIGHT ATRIAL & RIGHT VENTRICULAR N/A 10/27/2023    Procedure: Pacemaker Device & Lead Implant - Right Atrial & Right Ventricular;  Surgeon: Santosh Sanchez MD;  Location: [facility] CARDIAC CATH LAB    HERNIA REPAIR      Mitral valve replacement NOS          Physical Exam   Patient Vitals for the past 24 hrs:   BP Temp Temp src Pulse Resp SpO2   02/19/24 0440 -- -- -- -- -- 97 %   02/19/24 0439 -- -- -- -- -- 96 %   02/19/24 0438 -- -- -- -- -- 90 %   02/19/24 0437 -- -- -- -- -- 90 %   02/19/24 0436 -- -- -- -- -- 91 %   02/19/24 0435 -- -- -- -- -- (!) 89 %   02/19/24 0420 -- -- -- -- -- 100 %   02/19/24 0335 -- -- -- -- -- 98 %   02/19/24 0330 137/70 -- -- 76 -- 97 %   02/19/24 0327 -- -- -- -- -- 96 %   02/19/24 0322 -- -- -- -- -- 99 %   02/19/24 0317 136/80 -- -- 78 -- 99 %   02/19/24 0310 -- -- -- -- 18 --   02/19/24 0309 -- -- -- -- -- 98 %   02/19/24 0308 -- -- -- -- -- 95 %   02/19/24 0307  -- -- -- -- -- 98 %   02/19/24 0306 -- -- -- -- -- 99 %   02/19/24 0305 -- -- -- -- -- 100 %   02/19/24 0300 150/79 -- -- 74 -- 93 %   02/19/24 0255 -- -- -- -- -- (!) 88 %   02/19/24 0122 137/90 -- -- -- -- 95 %   02/19/24 0121 -- 97.8  F (36.6  C) Oral -- -- --   02/19/24 0120 -- -- -- -- -- 96 %      Physical Exam  Nursing note and vitals reviewed.  Constitutional: Cooperative.  Uncomfortable appearing  HENT:   Mouth/Throat: Mucous membranes are normal.   Cardiovascular: Normal rate, regular rhythm and artificial heart sounds.  No murmur. Pacemaker right upper chest.  Pulmonary/Chest: Effort normal and breath sounds normal. No respiratory distress. No wheezes.   Abdominal: Soft. Normal appearance and bowel sounds are normal. No distension. There is minimal diffuse tenderness. There is no rigidity and no guarding.   Neurological: Alert.  Oriented x 3  Skin: Skin is warm and dry.   Psychiatric: Normal mood and affect.      Emergency Department Course     Imaging:  Abd/pelvis CT,  IV  contrast only TRAUMA / AAA   Final Result   IMPRESSION:    1.  Small bowel obstruction.   2.  Tiny nonobstructing left renal calculus.         Laboratory:  Labs Ordered and Resulted from Time of ED Arrival to Time of ED Departure   COMPREHENSIVE METABOLIC PANEL - Abnormal       Result Value    Sodium 138      Potassium 4.2      Carbon Dioxide (CO2) 25      Anion Gap 13      Urea Nitrogen 21.4      Creatinine 0.95      GFR Estimate 77      Calcium 9.5      Chloride 100      Glucose 169 (*)     Alkaline Phosphatase 74      AST 40      ALT 23      Protein Total 7.3      Albumin 4.4      Bilirubin Total 1.2     CBC WITH PLATELETS AND DIFFERENTIAL - Abnormal    WBC Count 9.8      RBC Count 4.82      Hemoglobin 14.0      Hematocrit 42.9      MCV 89      MCH 29.0      MCHC 32.6      RDW 14.6      Platelet Count 240      % Neutrophils 93      % Lymphocytes 4      % Monocytes 3      % Eosinophils 0      % Basophils 0      % Immature  Granulocytes 0      NRBCs per 100 WBC 0      Absolute Neutrophils 9.1 (*)     Absolute Lymphocytes 0.4 (*)     Absolute Monocytes 0.3      Absolute Eosinophils 0.0      Absolute Basophils 0.0      Absolute Immature Granulocytes 0.0      Absolute NRBCs 0.0     INR - Abnormal    INR 2.32 (*)    LACTIC ACID WHOLE BLOOD - Abnormal    Lactic Acid 2.1 (*)    LIPASE - Normal    Lipase 27     Repeat LACTIC ACID WHOLE BLOOD: Pending       Interventions:  Medications   sodium chloride 0.9% BOLUS 1,000 mL (1,000 mLs Intravenous $New Bag 24)   ondansetron (ZOFRAN) injection 4 mg (4 mg Intravenous $Given 24)   morphine (PF) injection 4 mg (4 mg Intravenous $Given 24)      Assessments:  0226 I obtained history and examined the patient as noted above.  0412 I rechecked the patient and explained findings. We discussed plan for admission and patient is in agreement with plan.      Independent Interpretation (X-rays, CTs, rhythm strip):  I independently reviewed the patient's abdominal CT and note a small bowel obstruction.    Consultations/Discussion of Management or Tests:  0426 I spoke with Dr. Minor, of the hospitalist team, regarding the patient. They will accept the patient for admission.     Social Determinants of Health affecting care:   None    Disposition:  The patient was admitted to the hospital under the care of Dr. Minor.     Impression & Plan      Medical Decision Makin-year-old gentleman presents with abdominal pain and nausea.  He is appropriately anticoagulated on Coumadin for history of mechanical heart valve.  Unfortunately CT scan shows evidence of small bowel obstruction.  His abdominal exam is reassuring and his pains been well-controlled with the above measures.  Initial lactate was 2.1.  Will repeat this after liter of IV fluid.  Clinical concern for ongoing bowel ischemia is minimal.  I do not feel we need emergent surgery consultation.  He will be admitted for bowel  rest and IV fluids    Diagnosis:    ICD-10-CM    1. SBO (small bowel obstruction) (H)  K56.609          Scribe Disclosure:  I, Svetlana Trivedi, am serving as a scribe at 2:21 AM on 2/19/2024 to document services personally performed by Audi Orlando MD based on my observations and the provider's statements to me.   2/19/2024   Audi Orlando MD Amdahl, John, MD  02/19/24 0521

## 2024-02-19 NOTE — H&P
[facility]  Hospitalist Admission Note  Name: Salvador Costello    MRN: [id number]  YOB: 1935    Age: 88 year old  Date of admission: 2/19/2024  Primary care provider: Qing Nieves    Chief Complaint: Abdominal pain    Assessment and Plan:   SBO  History colon cancer: Presenting with diffuse moderate to severe abdominal pain along with nausea and dry heaving starting at 5 PM after eating some pizza.  CT of the abdomen pelvis shows an SBO.  Lactic acid slightly elevated at 2.1.  Pain essentially resolved after 1 dose IV morphine.  History of hemicolectomy in 1990 for colon cancer.  Denies any melena or hematochezia.  For the past 1 month he has had some intermittent constipation and diarrhea.  SBO likely secondary to adhesions although cannot rule out recurrence of colon cancer.  However no mass on CT, B symptoms, and it has been over 30 years since his hemicolectomy.  -Consult general surgery to follow along  -Receiving second liter NS and rechecking lactic acid  -N.p.o. except meds and a few ice chips if tolerated  -LR at 75 mL/h  -Essentially symptom-free at this time so held off on NG    S/p mechanical mitral and aortic valve replacements  Complete heart block s/p PPM: Underwent mechanical mitral and aortic valve replacement in 2007 for severe mitral and aortic valve regurgitation.  Chronically on warfarin and per his anticoagulation clinic his INR goal is 2-3.  INR 2.3 in the ER, he did not take his warfarin dose at 5 PM 2/18.  He also had complete heart block resulting in syncope in October 2023 and had a PPM placed.  -Ordered warfarin, pharmacy to dose, to continue with first dose this 2/19 presuming symptoms are improving throughout the day.  If abdominal pain is worsening then hold order he may need to be placed on his warfarin and heparin drip would need to be initiated given his mechanical valves  -Daily INR    HLD: Hold atorvastatin for now.    Former smoker: 65-pack-year  history.    DVT Prophylaxis: Warfarin  Code Status: Full Code -I did discuss this with him on admission.  Weighed the risk and benefits of resuscitation.  He would like to think about this further, but for now full code.  FEN: N.p.o. except meds and a few ice chips as tolerated, LR at 75 mL/h  Discharge Dispo: Home  Estimated Disch Date / # of Days until Disch: Admit inpatient for SBO.  Anticipate at least 2 night hospitalization before SBO resolved.      History of Present Illness:  Salvador Costello is a 88 year old male with PMH including mechanical aortic valve replacement in 2007 on chronic anticoagulation with warfarin, complete heart block s/p PPM in 2023, colon cancer s/p hemicolectomy in 1990, HLD, and former smoker who presents with abdominal pain.  He developed diffuse central abdominal pain around 5 PM after eating a little bit of pizza today.  The pain was constant and moderate to severe in intensity.  He did feel nauseous and had some dry heaving, but no emesis came out.  Has not had abdominal pain like this before, but has been having some intermittent constipation and diarrhea this past 1 month which is new.  He denies any melena or hematochezia.  Has not had any fevers, chills, weight loss recently.  He did miss his 2/18 evening warfarin dose that he normally takes at 5 PM.    History obtained from patient, medical record, and from Dr. Orlando in the emergency department.  Blood pressure 137/90, heart rate 82, temperature 97.8  F, oxygen 95% on room air.  His CBC and CMP are within normal limits.  Lipase not elevated.  INR therapeutic at 2.32.  Blood glucose is 169.  Lactic acid slightly elevated 2.1.  He received Zofran, morphine 4 mg IV, and 1 L NS.  Giving additional liter NS now for the elevated lactic acid and rechecking.  Currently pain-free after the dose of morphine.  Admit inpatient.       Clinically Significant Risk Factors Present on Admission               # Drug Induced Coagulation Defect:  home medication list includes an anticoagulant medication              # Pacemaker present                 Past Medical History reviewed:  Past Medical History:   Diagnosis Date    Colon cancer 1990    S/p hemicolectomy    Complete heart block (H) 10/2023    S/p PPM    H/O mechanical aortic valve replacement 2007    H/O mitral valve replacement with mechanical valve 2007    Hyperlipidemia     Osteopenia      Past Surgical History reviewed:  Past Surgical History:   Procedure Laterality Date    Aortic valve replacement NOS      Colon cancer resection      COLONOSCOPY N/A 10/21/2014    Procedure: COLONOSCOPY;  Surgeon: Brett Reece MD;  Location:  GI    EP PACEMAKER DEVICE & LEAD IMPLANT- RIGHT ATRIAL & RIGHT VENTRICULAR N/A 10/27/2023    Procedure: Pacemaker Device & Lead Implant - Right Atrial & Right Ventricular;  Surgeon: Santosh Sanchez MD;  Location:  HEART CARDIAC CATH LAB    HERNIA REPAIR      Mitral valve replacement NOS       Social History reviewed:  Social History     Tobacco Use    Smoking status: Former    Smokeless tobacco: Not on file   Substance Use Topics    Alcohol use: Yes     Alcohol/week: 2.5 standard drinks of alcohol     Types: 3 Cans of beer per week     Comment: a beer a day-has not has one for 1 month     Social History     Social History Narrative    Not on file     Family History reviewed:  Chart reviewed and noncontributory this admission    Allergies:  No Known Allergies  Medications:  Prior to Admission medications    Medication Sig Last Dose Taking? Auth Provider Long Term End Date   atorvastatin (LIPITOR) 10 MG tablet Take 10 mg by mouth daily  Yes Reported, Patient Yes        Audi Mckeon MD     Warfarin Sodium (COUMADIN PO) Take  by mouth.     Reported, Patient       Review of Systems:  A Comprehensive greater than 10 system review of systems was carried out.  Pertinent positives and negatives are noted above.  Otherwise negative.     Physical Exam:  Blood  pressure 137/70, pulse 76, temperature 97.8  F (36.6  C), temperature source Oral, resp. rate 18, SpO2 97%.  Wt Readings from Last 1 Encounters:   01/12/24 61.4 kg (135 lb 6.4 oz)     Exam:  Constitutional: Awake, NAD   Eyes: sclera white   HEENT:  MMM  Respiratory: no respiratory distress, lungs cta bilaterally, no crackles or wheeze  Cardiovascular: RRR.  Loud S1/S2 clicks.  No murmur appreciated  GI: Soft, slightly distended, minimal tenderness to palpation without guarding, bowel sounds present  Skin: no rash    Musculoskeletal/extremities: Trace bilateral lower extremity edema  Neurologic: A&O, hard of hearing, speech clear, answers questions appropriately, moves all extremities equally  Psychiatric: calm, cooperative, normal affect    Lab and imaging data personally reviewed:  Labs:  Recent Labs   Lab 02/19/24  0127   WBC 9.8   HGB 14.0   HCT 42.9   MCV 89        Recent Labs   Lab 02/19/24  0127      POTASSIUM 4.2   CHLORIDE 100   CO2 25   ANIONGAP 13   *   BUN 21.4   CR 0.95   GFRESTIMATED 77   AUDELIA 9.5   PROTTOTAL 7.3   ALBUMIN 4.4   BILITOTAL 1.2   ALKPHOS 74   AST 40   ALT 23     Recent Labs   Lab 02/19/24  0312   LACT 2.1*     Recent Labs   Lab 02/19/24  0127   LIPASE 27     INR 2.32    Imaging:  Recent Results (from the past 24 hour(s))   Abd/pelvis CT,  IV  contrast only TRAUMA / AAA    Narrative    EXAM: CT ABDOMEN PELVIS W CONTRAST  LOCATION: [facility]  DATE: 2/19/2024    INDICATION: Lower abd pain, nausea  COMPARISON: None.  TECHNIQUE: CT scan of the abdomen and pelvis was performed following injection of IV contrast. Multiplanar reformats were obtained. Dose reduction techniques were used.  CONTRAST: 68mL Isovue 370    FINDINGS:   LOWER CHEST: Bibasilar atelectasis. Cardiac valve replacement, cardiac leads median sternotomy.    HEPATOBILIARY: Cholelithiasis.    PANCREAS: Upper normal pancreatic duct.    SPLEEN: Normal.    ADRENAL GLANDS:  Normal.    KIDNEYS/BLADDER: Tiny nonobstructing left renal calculus.    BOWEL: Dilatation of several loops of small bowel in the left upper abdomen transitioning left of midline series 3 image 88-9 the 7.    LYMPH NODES: Normal.    VASCULATURE: Atherosclerotic vascular calcification.    PELVIC ORGANS: Prominent prostate.    MUSCULOSKELETAL: Degenerative change osseous structures. Severe compression deformity T12. Mild compression superior endplate L2 and moderate compression L4.      Impression    IMPRESSION:   1.  Small bowel obstruction.  2.  Tiny nonobstructing left renal calculus.       Andrew Minor MD  Hospitalist  [facility]

## 2024-02-19 NOTE — PLAN OF CARE
Goal Outcome Evaluation:    Patient admitted from ED around 6AM this morning. A&Ox4. Hard of hearing. Denies abdominal pain, bloating or nausea. Abdomen soft, non-tender. Bowel sounds normoactive. Patient is not passing gas. NPO except meds and few ice chips. Denies SOB. LS clear on RA. Vitals stable. Patient assist of 1 with GB and walker. Claiming he is feeling weak. Urinating in the urinal.

## 2024-02-19 NOTE — ED NOTES
Bed: [facility]  Expected date: 2/19/24  Expected time: 1:00 AM  Means of arrival:   Comments:  [id number]

## 2024-02-19 NOTE — PHARMACY-ANTICOAGULATION SERVICE
Clinical Pharmacy - Warfarin Dosing Consult     Pharmacy has been consulted to manage this patient s warfarin therapy.  Indication: Mechanical Mitral Valve Replacement  Therapy Goal: INR 2-3  Warfarin Prior to Admission: Yes  Warfarin PTA Regimen: 2.5 mg Monday, 5 mg all other days  Recent documented change in oral intake/nutrition: Unknown    INR   Date Value Ref Range Status   02/19/2024 2.32 (H) 0.85 - 1.15 Final   10/27/2023 2.2 (H) 0.9 - 1.1 Final       Recommend warfarin 5 mg today.  Pharmacy will monitor Salvador Costello daily and order warfarin doses to achieve specified goal.      Please contact pharmacy as soon as possible if the warfarin needs to be held for a procedure or if the warfarin goals change.

## 2024-02-20 NOTE — PROGRESS NOTES
[facility]   General Surgery Progress Note           Assessment and Plan:   Assessment:   SBO, clinically resolving  Gastrografin challenge 2/19, follow up images show contrast in colon      Plan:   -start clear liquid diet  -continue non-operative management  Seen and agree, AXR reviewed, exam benign, okay to start clears as above.         Interval History:   Comfortable in bed. Denies abd pain or bloating, but not hungry. Not passing flatus but he reports a large liquid BM. Hopes to discharge home tomorrow.         Physical Exam:   Blood pressure 120/52, pulse 71, temperature 98.1  F (36.7  C), temperature source Oral, resp. rate 16, height 1.829 m (6'), weight 60.6 kg (133 lb 9.6 oz), SpO2 92%.    I/O last 3 completed shifts:  In: -   Out: 200 [Urine:200]    Abdomen:   soft, non-distended, non-tender and normal bowel sounds           Data:     Recent Labs   Lab 02/20/24  0711 02/19/24  0127   WBC 9.7 9.8   HGB 13.3 14.0   HCT 42.2 42.9   MCV 92 89    240       Qing Batres PA-C  Text page: [phone] 8 am to 4 pm  After 4 pm, call [phone]

## 2024-02-20 NOTE — PROGRESS NOTES
[facility]    Hospitalist Progress Note      Assessment & Plan   Salvador Costello is a 88 year old male with PMH including mechanical aortic valve replacement in 2007 on chronic anticoagulation with warfarin, complete heart block s/p PPM in 2023, colon cancer s/p hemicolectomy in 1990, HLD, and former smoker who presents with abdominal pain.     #SBO.  History colon cancer: Presenting with diffuse moderate to severe abdominal pain along with nausea and dry heaving starting at 5 PM after eating some pizza.  CT of the abdomen pelvis shows an SBO.  Lactic acid slightly elevated at 2.1.  Pain essentially resolved after 1 dose IV morphine.  History of hemicolectomy in 1990 for colon cancer.  Denies any melena or hematochezia.  For the past 1 month he has had some intermittent constipation and diarrhea.  SBO likely secondary to adhesions although cannot rule out recurrence of colon cancer.  However no mass on CT, B symptoms, and it has been over 30 years since his hemicolectomy.  -Pt underwent gastrografin challenge and contrast seen in colon on 2/20.  He is having loose stools without blood noted.  Passing gas.  Started on clears and seemingly tolerating.    -Possibly advance to full liquids later today and if does well low fiber tomorrow.   -Continue IVF for 1 more day at low rate.  -Encouraged patient to ambulate in halls. Discussed with nursing.      #S/p mechanical mitral and aortic valve replacements.  Complete heart block s/p PPM: Underwent mechanical mitral and aortic valve replacement in 2007 for severe mitral and aortic valve regurgitation.  Chronically on warfarin and per his anticoagulation clinic his INR goal is 2-3.  INR 2.3 in the ER, he did not take his warfarin dose at 5 PM 2/18.  He also had complete heart block resulting in syncope in October 2023 and had a PPM placed.  -Seems to be improving with conservative management. Continue warfarin pharm to dose.      #HLD: Statin  #Former  smoker: 65-pack-year history.     DVT Prophylaxis: Warfarin  Code Status: Full Code. Discussed on admission.   Dispo: Possibly tomorrow. Lives in home by himself. Will get him up ambulating more today as well.     I did call and update daughterGuillermina by phone.      Nehemiah Mullins MD    Interval History   No events. Having loose stools. No blood. Passing gas. No n/v. Tolerating clear so far. No CP, Sob, fevers/chills. No HA.     -Data reviewed today: I reviewed all new labs and imaging results over the last 24 hours. I personally reviewed     Physical Exam   Temp: 97.8  F (36.6  C) Temp src: Oral BP: 128/65 Pulse: 66   Resp: 16 SpO2: 95 % O2 Device: None (Room air)    Vitals:    02/19/24 0556   Weight: 60.6 kg (133 lb 9.6 oz)     Vital Signs with Ranges  Temp:  [97.8  F (36.6  C)-99.3  F (37.4  C)] 97.8  F (36.6  C)  Pulse:  [66-84] 66  Resp:  [16] 16  BP: (120-150)/(52-72) 128/65  SpO2:  [92 %-98 %] 95 %  I/O last 3 completed shifts:  In: -   Out: 200 [Urine:200]    Constitutional: Nontoxic, NAD  HEENT: Normocephalic. MMM, No elevation of JVD noted.   Respiratory: Nl WOB, Clear bilaterally, No wheezes or crackles  Cardiovascular: Regular, Mechanical click noted.   GI: BS+, NT, ND  Skin/Integumen: WWP, no rash. No edema  Neuro: CNII-XII intact. Moves all extremities. No tremor. A&Ox3.    Medications    lactated ringers 75 mL/hr at 02/20/24 0800    - MEDICATION INSTRUCTIONS -        atorvastatin  10 mg Oral Daily    sodium chloride (PF)  3 mL Intracatheter Q8H    warfarin ANTICOAGULANT  2 mg Oral ONCE at 18:00    Warfarin Therapy Reminder  1 each Oral See Admin Instructions       Data   Recent Labs   Lab 02/20/24  0711 02/19/24  0127   WBC 9.7 9.8   HGB 13.3 14.0   MCV 92 89    240   INR 3.01* 2.32*    138   POTASSIUM 4.4 4.2   CHLORIDE 102 100   CO2 28 25   BUN 22.6 21.4   CR 0.85 0.95   ANIONGAP 8 13   AUDELIA 8.7* 9.5   * 169*   ALBUMIN  --  4.4   PROTTOTAL  --  7.3   BILITOTAL  --  1.2   ALKPHOS  --   74   ALT  --  23   AST  --  40   LIPASE  --  27       Recent Results (from the past 24 hour(s))   XR Gastrografin Challenge    Narrative    EXAM: XR GASTROGRAFIN CHALLENGE  LOCATION: [facility]  DATE: 2/19/2024    INDICATION: Small Bowel Obstruction  COMPARISON: CT abdomen/pelvis 02/19/2024  TECHNIQUE: Routine water soluble contrast follow-through challenge.    FINDINGS:  FLUOROSCOPIC TIME: 0 minutes  NUMBER OF IMAGES: 0 fluoroscopic images. 2 radiographic images.    Abdominal radiographs obtained 8 hours status post administration of enteric contrast.    Multiple dilated small bowel loops measuring up to 5.2 cm. Enteric contrast within the stomach and dilated small bowel. No enteric contrast identified in the colon. Excreted contrast in the urinary bladder.    Partially visualized CIED leads, median sternotomy wires, and cardiac prosthetic valves.      Impression    IMPRESSION:    No enteric contrast identified in colon at 8 hours, suggesting high-grade or complete small bowel obstruction.   XR Abdomen 1 View    Narrative    ABDOMEN ONE VIEW February 20, 2024 8:45 AM     HISTORY: Follow up gastrografin challenge.    COMPARISON: February 19, 2024       Impression    IMPRESSION: Contrast is present in the colon. Dilated small bowel  loops again evident. Small amount of stool.    ARLIN MARIE MD         SYSTEM ID:  [id number]

## 2024-02-20 NOTE — PLAN OF CARE
Pertinent assessments: A&Ox4. VSS on room air, afebrile. Denies pain and nausea this shift. Passing flatus, one loose BM - incontinent. Bowel sounds hypoactive. Up Ax1 with GB & walker .    Major Shift Events: None  Treatment Plan: Monitor bowel function, pain control, diet advancement.   Bedside Nurse: Mariluz Monge RN

## 2024-02-20 NOTE — UTILIZATION REVIEW
"[facility] Utilization Review  Admission Status; Secondary Review Determination     Admission Date: 2/19/2024  1:07 AM      Under the authority of the Utilization Management Committee, the utilization review process indicated a secondary review on the above patient.  The review outcome is based on review of the medical records, discussions with staff, and applying clinical experience noted on the date of the review.        (X) Observation Status Appropriate - This patient does not meet hospital inpatient criteria and is placed in observation status. If this patient's primary payer is Medicare and was admitted as an inpatient, Condition Code 44 should be used and patient status changed to \"observation\".   () Observation Status concurrent Review           RATIONALE FOR DETERMINATION   88-year-old male with history of diffuse moderate to severe abdominal pain with nausea and dry heaving, after eating pizza, CT abdomen showed small bowel obstruction with lactic acidosis, pain resolved with IV morphine.  Patient receiving IV fluid hydration, no more nausea and so normal NG placed, started on clear liquid diet, also had a large liquid bowel movement.  Gastrografin challenge with no enteric contrast in the colon at 8 hours, x-ray abdomen showed dilated small bowel loops with small amount of stool.  Patient started on clears and likely will be ready for discharge in a day, does not meet criteria for inpatient stay, recommend change to observation status, communicated to Dr. Mullins      The severity of illness, intensity of service provided, expected LOS make the care appropriate for observation status at this time.        The information on this document is developed by the utilization review team in order for the business office to ensure compliance.  This only denotes the appropriateness of proper admission status and does not reflect the quality of care rendered.              Sincerely,       Mary Tamayo, " MD  Physician Advisor  Utilization Review-[place]    Phone: [phone]

## 2024-02-20 NOTE — PLAN OF CARE
A&Ox4. [place]. Up Ax1 with gait belt and walker. IV Dilaudid given for pain. Zofran and IV Compazine given for nausea. No BM, reports passing small amounts of gas. Gastrografin challenge today, X-ray results pending. General surgery following, plan conservative treatment. NPO. Family visiting at bedside. Discharge pending.

## 2024-02-21 NOTE — DISCHARGE SUMMARY
[facility]    Discharge Summary  Hospitalist    Date of Admission:  2/19/2024  Date of Discharge:  2/21/2024  Discharging Provider: Nehemiah Mullins MD  Date of Service (when I saw the patient): 02/21/24    Discharge Diagnoses   #SBO.  History colon cancer  #S/p mechanical mitral and aortic valve replacements.  Complete heart block s/p PPM  #HLD  #Former smoker    Hospital Course   Salvador Costello is a 88 year old male with PMH including mechanical aortic valve replacement in 2007 on chronic anticoagulation with warfarin, complete heart block s/p PPM in 2023, colon cancer s/p hemicolectomy in 1990, HLD, and former smoker who presents with abdominal pain.      #SBO.  History colon cancer: Presenting with diffuse moderate to severe abdominal pain along with nausea and dry heaving starting at 5 PM after eating some pizza.  CT of the abdomen pelvis shows an SBO.  Lactic acid slightly elevated at 2.1.  Pain essentially resolved after 1 dose IV morphine.  History of hemicolectomy in 1990 for colon cancer.  Denies any melena or hematochezia.  For the past 1 month he has had some intermittent constipation and diarrhea.  SBO likely secondary to adhesions although cannot rule out recurrence of colon cancer.  However no mass on CT, B symptoms, and it has been over 30 years since his hemicolectomy.  -Pt underwent gastrografin challenge and contrast seen in colon on 2/20.  He was having loose stools without blood noted.  Passing gas.  He was started on clear liquids and tolerated.  He had slow advancement of his diet which was well-tolerated.  He will discharge on a low fiber diet.  He should follow-up with his PCP in 1 week for hospital follow-up.  I did recommend an INR in 1 week along with CBC and BMP.  -Patient ambulated in the halls with nursing without difficulty.  [place] ready for discharge home.  Discharged with PCP follow-up as above.     #S/p mechanical mitral and aortic valve replacements.  Complete  heart block s/p PPM: Underwent mechanical mitral and aortic valve replacement in 2007 for severe mitral and aortic valve regurgitation.  Chronically on warfarin and per his anticoagulation clinic his INR goal is 2-3.  INR 2.3 in the ER, he did not take his warfarin dose at 5 PM 2/18.  He also had complete heart block resulting in syncope in October 2023 and had a PPM placed.  -Seems to be improving with conservative management. Continue warfarin pharm to dose.  INR 3.48 on day of discharge.  I did instruct him to hold his warfarin this evening and resume it at usual dosing tomorrow, 2/22.  He did voiced understanding.     #HLD: Statin  #Former smoker: 65-pack-year history.     Nehemiah Mullins MD    Code Status   Full Code       Primary Care Physician   Qing Nieves    Physical Exam   Temp: 98.1  F (36.7  C) Temp src: Oral BP: 115/59 Pulse: 70   Resp: 20 SpO2: 95 % O2 Device: None (Room air)    Vitals:    02/19/24 0556 02/21/24 0445   Weight: 60.6 kg (133 lb 9.6 oz) 61.8 kg (136 lb 3.9 oz)     Vital Signs with Ranges  Temp:  [97.5  F (36.4  C)-98.4  F (36.9  C)] 98.1  F (36.7  C)  Pulse:  [65-70] 70  Resp:  [16-20] 20  BP: (114-141)/(59-76) 115/59  SpO2:  [90 %-97 %] 95 %  I/O last 3 completed shifts:  In: 100 [P.O.:100]  Out: 700 [Urine:700]    Constitutional: Nontoxic, NAD  HEENT: Normocephalic. MMM, No elevation of JVD noted.   Respiratory: Nl WOB, Clear bilaterally, No wheezes or crackles  Cardiovascular: Regular, Mechanical click noted.   GI: BS+, NT, ND  Skin/Integumen: WWP, no rash. No edema  Neuro: CNII-XII intact. Moves all extremities. No tremor. A&Ox3.    Discharge Disposition   Discharged to home  Condition at discharge: Stable    Consultations This Hospital Stay   PHARMACY TO DOSE WARFARIN  SURGERY GENERAL IP CONSULT    Time Spent on this Encounter   I, Nehemiah Mullins MD, personally saw the patient today and spent greater than 30 minutes discharging this patient.    Discharge Orders      Reason for your  hospital stay    You were hospitalized for a small bowel obstruction.  You were seen by general surgery.  You were treated with IV fluids and underwent a Gastrografin study which did show contrast through the colon.  You tolerated slow advancement of your diet.  You will follow-up with your PCP in 1 week.     Activity    Your activity upon discharge: activity as tolerated     Follow-up and recommended labs and tests     Follow up with primary care provider, Qing Nieves, within 7 days for hospital follow- up.  The following labs/tests are recommended: CBC, BMP, INR.      Hold your warfarin dose tonight on 2/21.  Resume usual warfarin dosing tomorrow on 2/22 as now tolerating usual diet.  INR check next week with PCP follow up.     Full Code     Diet    Follow this diet upon discharge: Orders Placed This Encounter      Low fiber diet     Discharge Medications   Current Discharge Medication List        CONTINUE these medications which have NOT CHANGED    Details   atorvastatin (LIPITOR) 10 MG tablet Take 10 mg by mouth daily      Warfarin Sodium (COUMADIN PO) Take by mouth daily Takes 2.5 mg Monday, 5 mg all other days of the week           Allergies   No Known Allergies  Data   Most Recent 3 CBC's:  Recent Labs   Lab Test 02/20/24  0711 02/19/24  0127 10/27/23  0643   WBC 9.7 9.8 6.6   HGB 13.3 14.0 13.4   MCV 92 89 93    240 261      Most Recent 3 BMP's:  Recent Labs   Lab Test 02/20/24  0711 02/19/24  0127 10/27/23  0643    138 141   POTASSIUM 4.4 4.2 5.3   CHLORIDE 102 100 104   CO2 28 25 29   BUN 22.6 21.4 25.5*   CR 0.85 0.95 0.93   ANIONGAP 8 13 8   AUDELIA 8.7* 9.5 9.4   * 169* 96     Most Recent 2 LFT's:  Recent Labs   Lab Test 02/19/24 0127   AST 40   ALT 23   ALKPHOS 74   BILITOTAL 1.2     Most Recent INR's and Anticoagulation Dosing History:  Anticoagulation Dose History  More data exists         Latest Ref Rng & Units 10/21/2014 2/4/2022 9/29/2023 10/27/2023 2/19/2024 2/20/2024 2/21/2024    Recent Dosing and Labs   warfarin ANTICOAGULANT (COUMADIN) 2 MG tablet - - - - - - 2 mg, $Given -   warfarin ANTICOAGULANT (COUMADIN) 5 MG tablet - - - - - 5 mg, $Given - -   INR 0.85 - 1.15 2.09  2.23  2.10  2.2  2.32  3.01  3.48      Most Recent 3 Troponin's:No lab results found.  Most Recent Cholesterol Panel:No lab results found.  Most Recent 6 Bacteria Isolates From Any Culture (See EPIC Reports for Culture Details):No lab results found.  Most Recent TSH, T4 and A1c Labs:No lab results found.

## 2024-02-21 NOTE — PROGRESS NOTES
[facility]   General Surgery Progress Note         Assessment and Plan:   Assessment:   SBO, clinically resolving  Gastrografin challenge 2/19, follow up images show contrast in colon  Having BMs      Plan:   -Diet: advance as tolerated  -continue non-operative management  -No surgical plans, ok to discharge per hospitalist. Discharge instructions in chart.         Interval History:   Comfortable in bed. Denies abd pain or bloating, but not hungry. Not passing flatus but he reports a large liquid BM. Hopes to discharge home today.         Physical Exam:   Blood pressure 134/74, pulse 69, temperature 97.9  F (36.6  C), temperature source Oral, resp. rate 20, height 1.829 m (6'), weight 61.8 kg (136 lb 3.9 oz), SpO2 97%.    I/O last 3 completed shifts:  In: 100 [P.O.:100]  Out: 700 [Urine:700]    Abdomen:   soft, non-distended, non-tender and normal bowel sounds           Data:     Recent Labs   Lab 02/20/24  0711 02/19/24  0127   WBC 9.7 9.8   HGB 13.3 14.0   HCT 42.2 42.9   MCV 92 89    240       Jung Farias PA-C

## 2024-02-21 NOTE — PHARMACY-ANTICOAGULATION SERVICE
Clinical Pharmacy- Warfarin Discharge Note  This patient is currently on warfarin for the treatment of:    S/p mechanical mitral and aortic valve replacements.   INR Goal= 2-3  Expected length of therapy undetermined.    Warfarin PTA Regimen: 2.5 mg Monday, 5 mg all other days      Anticoagulation Dose History  More data exists         Latest Ref Rng & Units 10/21/2014 2/4/2022 9/29/2023 10/27/2023 2/19/2024 2/20/2024 2/21/2024   Recent Dosing and Labs   warfarin ANTICOAGULANT (COUMADIN) 2 MG tablet - - - - - - 2 mg, $Given -   warfarin ANTICOAGULANT (COUMADIN) 5 MG tablet - - - - - 5 mg, $Given - -   INR 0.85 - 1.15 2.09  2.23  2.10  2.2  2.32  3.01  3.48        Vitamin K doses administered during the last 7 days: -  FFP administered during the last 7 days: -  Reviewed PTA, inpatient and discharge warfarin orders. Plan is to hold warfarin tonight, then resume at PTA dose. INR should be checked early next week.

## 2024-02-21 NOTE — PLAN OF CARE
PRIMARY DIAGNOSIS: GASTROENTERITIS/SOB    OUTPATIENT/OBSERVATION GOALS TO BE MET BEFORE DISCHARGE  1. Orthostatic performed: No    2. Tolerating PO fluid and/or antibiotics (if applicable): Yes    3. Nausea/Vomiting/Diarrhea symptoms improved: Yes    4. Pain status: Pain free.    5. Return to near baseline physical activity: Yes    Discharge Planner Nurse   Safe discharge environment identified: Yes  Barriers to discharge: No       Entered by: Ivana Bates RN 02/20/2024 9:38 PM  Vital signs:  Temp: 98.4  F (36.9  C) Temp src: Oral BP: 117/63 Pulse: 65   Resp: 16 SpO2: 92 % O2 Device: None (Room air) Oxygen Delivery: 2 LPM Height: 182.9 cm (6') Weight: 60.6 kg (133 lb 9.6 oz)  Estimated body mass index is 18.12 kg/m  as calculated from the following:    Height as of this encounter: 1.829 m (6').    Weight as of this encounter: 60.6 kg (133 lb 9.6 oz).  Please review provider order for any additional goals.   Nurse to notify provider when observation goals have been met and patient is ready for discharge.

## 2024-02-21 NOTE — DISCHARGE INSTRUCTIONS
"HOME CARE FOLLOWING BOWEL OBSTRUCTION ADMISSION  LINSEY Leal, GERMAINE Wagner, SJ Alvarado, JOSE Greenberg    ACTIVITY:  Light Activity -- you may immediately be up and about as tolerated.  Driving -- you may drive when comfortable and off narcotic pain medications.  Light Work -- resume when comfortable off pain medications.  (If you can drive, you probably can work.)  Strenuous Work/Activity -- limit lifting to 15 pounds for 1-2 weeks.  Then, progressively increase with time.  Active Sports (running, biking, etc.) -- cautiously resume after 4 weeks, or when cleared by your surgeon.    DISCOMFORT:  Expect gradual improvement of residual abdominal soreness as your bowel function continues to return to normal over the following 1-2 weeks.  Please contact the office if you have worsening of abdominal pain, or onset of nausea/vomiting.    DIET:  Continue on a \"soft\" diet (i.e. cooked vegetables, soups, processed meats, light/white bread, mashed potatoes, rice, yogurt) for the first one to two week after discharge from the hospital.  After this time, you may return to diet you were on before surgery minimizing high cellulose foods and foods with \"skins\" (i.e.grapes, apple, citrus, corn) only.  This would include limiting: brussel sprouts, cabbage & kale, alfalfa sprouts, zucchini, squash, potatoes, carrots, and yams.  Drink plenty of fluids.    SURGEON CONTACT/APPOINTMENTS:  Office Phone:  [phone]     CONTACT US IF THE FOLLOWING DEVELOPS:   1. A fever that is above 101     2. Severe pain that is not relieved by your prescription.        If you have other questions, please call the office Monday thru Friday between 8am and 4:30pm to discuss with the nurse or physician assistant.  #[phone]    There is a surgeon ON CALL on weekday evenings and over the weekend in case of urgent need only, and may be contacted at the same number.    If you are having an emergency, call 911 or proceed to your " nearest emergency department.

## 2024-02-21 NOTE — PLAN OF CARE
For vital signs and complete assessments, please see documentation flowsheets.     [id number]  Pertinent assessments: Pt A&Ox4. Ax1 with gait belt & walker in room. On RA. Afebrile. Elevated BP, other VSS. Denies pain, nausea, & SOB. No BM during shift. Urinal at bedside. PIV infusing LR at 75mls/hr.    Major Shift Events: none    Treatment Plan: IVF. Pain control. Monitor bowel function. Diet advancement.    Bedside Nurse: Malvin Mccurdy RN

## 2024-03-05 NOTE — PROGRESS NOTES
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 (2007) PFO s/p closure, High-grade AV block s/p PPM, hyperlipidemia, colon cancer s/p resection, sleep apnea, and osteoporosis    Mr Costello underwent double valve replacement with an ATS valves with closure of a PFO at [facility] and has been on warfarin since (2007)  Prior cath showed normal coronaries    ECHO (2008) showed LVEF 55%, moderate LVH, mean AV gradient 10 mmHg, mean MV gradient 4.3 mmHg    He suffered a syncopal episode with resultant T12 fracture  (9/2023) Subsequent monitoring revealed episodes of complete heart block  He received a dual-chamber PPM (10/2023) ECHO then showed LVEF 55%, LBBB, normal RV function, mild-moderate left atrial enlargement, mean aortic valve gradient 10 mmHg, mean mitral valve gradient 6 mmHg, RVSP 25 mmHg    Device interrogation (1/24/24) showed 28% A-paced  1% V-paced  No arrhythmias    He was hospitalized (2/19/24) for a small bowel obstruction which was treated conservatively    Our visit today is for further review    Mr Costello comes in with his daughter today   Overall, he has not complaints  He completed PT for his back fracture  He denies any chest pain, shortness of breath, palpitations, lightheadedness, orthopnea, or peripheral edema  He states that he has been on the same medications for many years      VITAL SIGNS:  BP: 126/66  Pulse:  82  Weight:   134 lbs  (BMI: 18)  stable    IMPRESSION AND PLAN:    S/p Mechanical AVR and MVR (2007)  -mean MV gradient 6 mmHg, mean AV gradient 10 mmHg (10/2023)  -on chronic warfarin  (INR goal 2.5)    Advanced Heart Block s/p dual-chamber PPM (10/2023)  -28% A-paced  -continue device cares    Hyperlipidemia:  -on Atorvastatin 10 mg  -LDL 89    The total time for the visit today was 25 minutes which includes patient visit,  reviewing of records, discussion, and placing of orders of the outpatient coordination of cardiovascular care as described.  The level of medical decision making during this visit was of mild-moderate complexity.  Thank you for allowing me to participate in their care.    The longitudinal plan of care for the diagnosis(es)/condition(s) as documented were addressed during this visit. Due to the added complexity in care, I will continue to support Salvador in the subsequent management and with ongoing continuity of care.      Orders Placed This Encounter   Procedures    Follow-Up with Cardiology       No orders of the defined types were placed in this encounter.      There are no discontinued medications.      Encounter Diagnoses   Name Primary?    Cardiac pacemaker in situ     Complete heart block (H)     Syncope, unspecified syncope type        CURRENT MEDICATIONS:  Current Outpatient Medications   Medication Sig Dispense Refill    atorvastatin (LIPITOR) 10 MG tablet Take 10 mg by mouth daily      Warfarin Sodium (COUMADIN PO) Take by mouth daily Takes 2.5 mg Monday, 5 mg all other days of the week         ALLERGIES   No Known Allergies    PAST MEDICAL HISTORY:  Past Medical History:   Diagnosis Date    Colon cancer 1990    S/p hemicolectomy    Complete heart block (H) 10/2023    S/p PPM    H/O mechanical aortic valve replacement 2007    H/O mitral valve replacement with mechanical valve 2007    Hyperlipidemia     Osteopenia        PAST SURGICAL HISTORY:  Past Surgical History:   Procedure Laterality Date    Aortic valve replacement NOS      Colon cancer resection      COLONOSCOPY N/A 10/21/2014    Procedure: COLONOSCOPY;  Surgeon: Brett Reece MD;  Location:  GI    EP PACEMAKER DEVICE & LEAD IMPLANT- RIGHT ATRIAL & RIGHT VENTRICULAR N/A 10/27/2023    Procedure: Pacemaker Device & Lead Implant - Right Atrial & Right Ventricular;  Surgeon: Santosh Sanchez MD;  Location: [facility] CARDIAC CATH LAB    HERNIA  REPAIR      Mitral valve replacement NOS         FAMILY HISTORY:  No family history on file.    SOCIAL HISTORY:  Social History     Socioeconomic History    Marital status:    Tobacco Use    Smoking status: Former   Substance and Sexual Activity    Alcohol use: Yes     Alcohol/week: 2.5 standard drinks of alcohol     Types: 3 Cans of beer per week     Comment: a beer a day       Review of Systems:  Skin:          Eyes:         ENT:         Respiratory:  Negative       Cardiovascular:  Negative      Gastroenterology:        Genitourinary:         Musculoskeletal:         Neurologic:         Psychiatric:         Heme/Lymph/Imm:         Endocrine:           Physical Exam:  Vitals: /66 (BP Location: Right arm, Patient Position: Sitting, Cuff Size: Adult Regular)   Pulse 82   Ht 1.829 m (6')   Wt 60.9 kg (134 lb 3.2 oz)   SpO2 98%   BMI 18.20 kg/m      Constitutional:  cooperative        Skin:  warm and dry to the touch   pacemaker incision in the right infraclavicular area was well-healed      Head:  normocephalic        Eyes:  pupils equal and round        Lymph:      ENT:  no pallor or cyanosis        Neck:  JVP normal        Respiratory:  clear to auscultation;normal respiratory excursion         Cardiac: regular rhythm occasional premature beats   crisp prosthetic valve sounds          pulses full and equal                                        GI:  abdomen soft        Extremities and Muscular Skeletal:  no edema              Neurological:  affect appropriate        Psych:  Alert and Oriented x 3          CC  Santosh Braulio Sanchez MD  [address]  [place] [id number]

## 2024-03-06 NOTE — LETTER
3/6/2024    Qing Nieves, DO  [address] [facility] [place] [address]    RE: Salvador MARINO Costello       Dear Colleague,     I had the pleasure of seeing Salvador Costello in the [facility].  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 (2007) PFO s/p closure, High-grade AV block s/p PPM, hyperlipidemia, colon cancer s/p resection, sleep apnea, and osteoporosis    Mr Costello underwent double valve replacement with an ATS valves with closure of a PFO at [facility] and has been on warfarin since (2007)  Prior cath showed normal coronaries    ECHO (2008) showed LVEF 55%, moderate LVH, mean AV gradient 10 mmHg, mean MV gradient 4.3 mmHg    He suffered a syncopal episode with resultant T12 fracture  (9/2023) Subsequent monitoring revealed episodes of complete heart block  He received a dual-chamber PPM (10/2023) ECHO then showed LVEF 55%, LBBB, normal RV function, mild-moderate left atrial enlargement, mean aortic valve gradient 10 mmHg, mean mitral valve gradient 6 mmHg, RVSP 25 mmHg    Device interrogation (1/24/24) showed 28% A-paced  1% V-paced  No arrhythmias    He was hospitalized (2/19/24) for a small bowel obstruction which was treated conservatively    Our visit today is for further review    Mr Costello comes in with his daughter today   Overall, he has not complaints  He completed PT for his back fracture  He denies any chest pain, shortness of breath, palpitations, lightheadedness, orthopnea, or peripheral edema  He states that he has been on the same medications for many years      VITAL SIGNS:  BP: 126/66  Pulse:  82  Weight:   134 lbs  (BMI: 18)  stable    IMPRESSION AND PLAN:    S/p Mechanical AVR and MVR (2007)  -mean MV gradient 6 mmHg, mean AV gradient 10 mmHg (10/2023)  -on chronic warfarin  (INR goal 2.5)    Advanced Heart Block s/p  dual-chamber PPM (10/2023)  -28% A-paced  -continue device cares    Hyperlipidemia:  -on Atorvastatin 10 mg  -LDL 89    The total time for the visit today was 25 minutes which includes patient visit, reviewing of records, discussion, and placing of orders of the outpatient coordination of cardiovascular care as described.  The level of medical decision making during this visit was of mild-moderate complexity.  Thank you for allowing me to participate in their care.    The longitudinal plan of care for the diagnosis(es)/condition(s) as documented were addressed during this visit. Due to the added complexity in care, I will continue to support Salvador in the subsequent management and with ongoing continuity of care.      Orders Placed This Encounter   Procedures    Follow-Up with Cardiology       No orders of the defined types were placed in this encounter.      There are no discontinued medications.      Encounter Diagnoses   Name Primary?    Cardiac pacemaker in situ     Complete heart block (H)     Syncope, unspecified syncope type        CURRENT MEDICATIONS:  Current Outpatient Medications   Medication Sig Dispense Refill    atorvastatin (LIPITOR) 10 MG tablet Take 10 mg by mouth daily      Warfarin Sodium (COUMADIN PO) Take by mouth daily Takes 2.5 mg Monday, 5 mg all other days of the week         ALLERGIES   No Known Allergies    PAST MEDICAL HISTORY:  Past Medical History:   Diagnosis Date    Colon cancer 1990    S/p hemicolectomy    Complete heart block (H) 10/2023    S/p PPM    H/O mechanical aortic valve replacement 2007    H/O mitral valve replacement with mechanical valve 2007    Hyperlipidemia     Osteopenia        PAST SURGICAL HISTORY:  Past Surgical History:   Procedure Laterality Date    Aortic valve replacement NOS      Colon cancer resection      COLONOSCOPY N/A 10/21/2014    Procedure: COLONOSCOPY;  Surgeon: Brett Reece MD;  Location:  GI    EP PACEMAKER DEVICE & LEAD IMPLANT- RIGHT ATRIAL  & RIGHT VENTRICULAR N/A 10/27/2023    Procedure: Pacemaker Device & Lead Implant - Right Atrial & Right Ventricular;  Surgeon: Santosh Sanchez MD;  Location:  HEART CARDIAC CATH LAB    HERNIA REPAIR      Mitral valve replacement NOS         FAMILY HISTORY:  No family history on file.    SOCIAL HISTORY:  Social History     Socioeconomic History    Marital status:    Tobacco Use    Smoking status: Former   Substance and Sexual Activity    Alcohol use: Yes     Alcohol/week: 2.5 standard drinks of alcohol     Types: 3 Cans of beer per week     Comment: a beer a day       Review of Systems:  Skin:          Eyes:         ENT:         Respiratory:  Negative       Cardiovascular:  Negative      Gastroenterology:        Genitourinary:         Musculoskeletal:         Neurologic:         Psychiatric:         Heme/Lymph/Imm:         Endocrine:           Physical Exam:  Vitals: /66 (BP Location: Right arm, Patient Position: Sitting, Cuff Size: Adult Regular)   Pulse 82   Ht 1.829 m (6')   Wt 60.9 kg (134 lb 3.2 oz)   SpO2 98%   BMI 18.20 kg/m      Constitutional:  cooperative        Skin:  warm and dry to the touch   pacemaker incision in the right infraclavicular area was well-healed      Head:  normocephalic        Eyes:  pupils equal and round        Lymph:      ENT:  no pallor or cyanosis        Neck:  JVP normal        Respiratory:  clear to auscultation;normal respiratory excursion         Cardiac: regular rhythm occasional premature beats   crisp prosthetic valve sounds          pulses full and equal                                        GI:  abdomen soft        Extremities and Muscular Skeletal:  no edema              Neurological:  affect appropriate        Psych:  Alert and Oriented x 3          CC  Santosh Soto MD  [address]  [place] [address]                      Thank you for allowing me to participate in the care of your patient.      Sincerely,     ERLINDA Peralta CNP  [facility]

## 2024-08-29 NOTE — ED TRIAGE NOTES
BIBA from home. Lives at home independently. HX: colon cancer >40 years ago. HX: bowel obstruction in Feb. C/o abdominal pain that started last night, associated with nausea, small emesis X2 this AM. Last BM 1.5-2 days ago. Denies blood in stool or emesis. Not passing gas. Pain 8/10. Pacemaker present. A&OX4.      Triage Assessment (Adult)       Row Name 08/29/24 0758          Triage Assessment    Airway WDL WDL        Respiratory WDL    Respiratory WDL WDL        Skin Circulation/Temperature WDL    Skin Circulation/Temperature WDL WDL        Cardiac WDL    Cardiac WDL WDL        Peripheral/Neurovascular WDL    Peripheral Neurovascular WDL WDL        Cognitive/Neuro/Behavioral WDL    Cognitive/Neuro/Behavioral WDL WDL

## 2024-08-29 NOTE — PHARMACY-ADMISSION MEDICATION HISTORY
Pharmacist Admission Medication History    Admission medication history is complete. The information provided in this note is only as accurate as the sources available at the time of the update.    Information Source(s): Patient via in-person    Pertinent Information:      Changes made to PTA medication list:  Added: Vit D  Deleted: None  Changed: None    Allergies reviewed with patient and updates made in EHR: yes    Medication History Completed By: Renee Saba RP 8/29/2024 11:53 AM    PTA Med List   Medication Sig Last Dose    atorvastatin (LIPITOR) 10 MG tablet Take 10 mg by mouth daily 8/28/2024 at am    Vitamin D3 (CHOLECALCIFEROL) 25 mcg (1000 units) tablet Take 25 mcg by mouth daily. 8/28/2024 at am    Warfarin Sodium (COUMADIN PO) Take by mouth daily Takes 2.5 mg Monday, 5 mg all other days of the week 8/28/2024 at @1716

## 2024-08-29 NOTE — ED PROVIDER NOTES
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 The patient reports he began having abdominal pain last night around 8:00 PM and he has not had a bowel movement for the past two days. He reports the pain is a 7-8/10 severity and nothing he does makes it better or worse. He endorses some nausea. He denies vomiting, chest pain, shortness of breath, cough, rhinorrhea, sore throat, back pain, dysuria, or bloody/tarry stool. Of note, the patient lives independently at home.      Independent Historian   None    Review of External Notes   Reviewed primary care note from 6/26/2024 regarding medication management and INR    Past Medical History     Medical History and Problem List   Aortic valve disorder  Mitral valve disorders  Colon cancer  Complete heart block  Mitral valve replacement with mechanical valve  Hearing loss  Hyperlipidemia  Osteopenia  Small vowel obstruction  Vitamin D deficiency    Medications   Atorvastatin   Warfarin     Surgical History   Aortic valve replacement  Angiocardiogram  Colon cancer resection  Colonoscopy  Colectomy  Ep pacemaker device and lead implant  Hernia repair  Mitral valve replacement    Physical Exam     Patient Vitals for the past 24 hrs:   BP Temp Temp src Pulse Resp SpO2 Height Weight   08/29/24 1508 (!) 155/85 98.5  F (36.9  C) Oral 81 16 97 % -- --   08/29/24 1444 (!) 154/92 -- -- 87 -- 95 % -- --   08/29/24 1443 -- 98  F (36.7  C) Oral -- 18 96 % -- --   08/29/24 1327 (!) 151/89 98.6  F (37  C) Oral 82 16 95 % -- --   08/29/24 1130 (!) 158/83 -- -- 78 -- 97 % -- --   08/29/24 1100 (!) 145/80 -- -- -- -- -- -- --   08/29/24 1032 (!) 152/78 -- -- 77 -- -- -- --   08/29/24 1017 (!) 161/93 -- -- 77 -- --  "-- --   08/29/24 1002 (!) 157/82 -- -- 76 -- -- -- --   08/29/24 0951 (!) 159/77 -- -- 80 -- 97 % -- --   08/29/24 0915 (!) 158/91 -- -- -- -- 97 % -- --   08/29/24 0900 (!) 163/90 -- -- 77 -- 95 % -- --   08/29/24 0844 (!) 166/86 -- -- 78 -- -- -- --   08/29/24 0830 (!) 158/84 -- -- -- -- 100 % -- --   08/29/24 0756 (!) 162/89 97.6  F (36.4  C) Temporal 80 20 99 % 1.803 m (5' 11\") 62.6 kg (138 lb)     Physical Exam  General: Resting on the bed.  Head: No obvious trauma to head.  Ears, Nose, Throat:  External ears normal.  Nose normal.    Eyes:  Conjunctivae clear.  Pupils are equal, round, and reactive.   Neck: Normal range of motion.  Neck supple.   CV: Regular rate and rhythm.  + murmurs.      Respiratory: Effort normal and breath sounds normal.  No wheezing or crackles.   Gastrointestinal: Soft.  No distension. There is periumbilical and LLQ tenderness.  There is no rigidity, no rebound and no guarding.   Musculoskeletal: No CVA tenderness   Neuro: Alert. Moving all extremities appropriately.  Normal speech.    Skin: Skin is warm and dry.  No rash noted.       Diagnostics     Lab Results   Labs Ordered and Resulted from Time of ED Arrival to Time of ED Departure   COMPREHENSIVE METABOLIC PANEL - Abnormal       Result Value    Sodium 137      Potassium 4.5      Carbon Dioxide (CO2) 22      Anion Gap 16 (*)     Urea Nitrogen 23.6 (*)     Creatinine 0.93      GFR Estimate 78      Calcium 9.3      Chloride 99      Glucose 168 (*)     Alkaline Phosphatase 71      AST 41      ALT 23      Protein Total 7.0      Albumin 4.4      Bilirubin Total 1.1     LACTIC ACID WHOLE BLOOD WITH 1X REPEAT IN 2 HR WHEN >2 - Abnormal    Lactic Acid, Initial 3.8 (*)    INR - Abnormal    INR 2.30 (*)    ROUTINE UA WITH MICROSCOPIC REFLEX TO CULTURE - Abnormal    Color Urine Yellow      Appearance Urine Slightly Cloudy (*)     Glucose Urine Negative      Bilirubin Urine Negative      Ketones Urine Negative      Specific Gravity Urine 1.024 "      Blood Urine Negative      pH Urine 7.5 (*)     Protein Albumin Urine 30 (*)     Urobilinogen Urine Normal      Nitrite Urine Negative      Leukocyte Esterase Urine Negative      Bacteria Urine Few (*)     Mucus Urine Present (*)     Amorphous Crystals Urine Few (*)     RBC Urine 3 (*)     WBC Urine 3      Squamous Epithelials Urine 1      Hyaline Casts Urine 1     CBC WITH PLATELETS AND DIFFERENTIAL - Abnormal    WBC Count 11.3 (*)     RBC Count 4.83      Hemoglobin 14.2      Hematocrit 43.8      MCV 91      MCH 29.4      MCHC 32.4      RDW 15.8 (*)     Platelet Count 311      % Neutrophils 92      % Lymphocytes 3      % Monocytes 4      % Eosinophils 0      % Basophils 0      % Immature Granulocytes 1      NRBCs per 100 WBC 0      Absolute Neutrophils 10.4 (*)     Absolute Lymphocytes 0.4 (*)     Absolute Monocytes 0.4      Absolute Eosinophils 0.0      Absolute Basophils 0.0      Absolute Immature Granulocytes 0.1      Absolute NRBCs 0.0     LIPASE - Normal    Lipase 29     LACTIC ACID WHOLE BLOOD - Normal    Lactic Acid 1.9     MAGNESIUM - Normal    Magnesium 2.2     BLOOD CULTURE       Imaging   CTA Abdomen Pelvis with Contrast   Final Result   IMPRESSION:    1. Distal small bowel obstruction   2. Nonobstructing left renal calculus.   3. Cholelithiasis.   4. 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            AYLEEN THACKER MD            SYSTEM ID:  [id number]      XR Gastrografin Challenge    (Results Pending)         Independent Interpretation   None    ED Course      Medications Administered   Medications   ondansetron (ZOFRAN) injection 4 mg (4 mg Intravenous $Given 8/29/24 1440)   diatrizoate meglumine-sodium (GASTROGRAFIN) 120 mL in sterile water (bottle) 150 mL (has no administration in time range)   Warfarin Dose Required Daily - Pharmacist Managed (has no administration in time range)   lidocaine 1 % 0.1-1 mL (has no administration in time range)   lidocaine  (LMX4) cream (has no administration in time range)   sodium chloride (PF) 0.9% PF flush 3 mL (3 mLs Intracatheter $Given 8/29/24 1418)   sodium chloride (PF) 0.9% PF flush 3 mL (has no administration in time range)   Patient is already receiving anticoagulation with heparin, enoxaparin (LOVENOX), warfarin (COUMADIN)  or other anticoagulant medication (has no administration in time range)   sodium chloride 0.9 % infusion ( Intravenous $New Bag 8/29/24 1418)   acetaminophen (TYLENOL) tablet 650 mg (has no administration in time range)   oxyCODONE IR (ROXICODONE) half-tab 2.5 mg (has no administration in time range)   oxyCODONE (ROXICODONE) tablet 5 mg (has no administration in time range)   HYDROmorphone (DILAUDID) injection 0.2 mg (has no administration in time range)   HYDROmorphone (DILAUDID) injection 0.4 mg (has no administration in time range)   ondansetron (ZOFRAN ODT) ODT tab 4 mg (has no administration in time range)     Or   ondansetron (ZOFRAN) injection 4 mg (has no administration in time range)   pantoprazole (PROTONIX) IV push injection 40 mg (40 mg Intravenous $Given 8/29/24 1429)   acetaminophen (TYLENOL) tablet 975 mg (975 mg Oral $Given 8/29/24 0819)   sodium chloride 0.9% BOLUS 1,000 mL (0 mLs Intravenous Stopped 8/29/24 1032)   piperacillin-tazobactam (ZOSYN) 4.5 g vial to attach to  mL bag (0 g Intravenous Stopped 8/29/24 1058)   iopamidol (ISOVUE-370) solution 500 mL (72 mLs Intravenous $Given 8/29/24 0936)   CT scan flush use (80 mLs As instructed $Given 8/29/24 0936)   HYDROmorphone (DILAUDID) injection 0.2 mg (0.2 mg Intravenous $Given 8/29/24 1018)       Procedures   Procedures     Discussion of Management   None    ED Course   ED Course as of 08/29/24 1521   Thu Aug 29, 2024   4736 I obtained history and examined the patient as noted above.   1105 I spoke with radiology about CTA.  No concerns for mesenteric ischemia with patent SMA and celiac.  No concerns regarding VENANCIO not being  "found.    1113 I spoke patients daughter and updated her on results     1116 I spoke with Angela Llanos for admission with Dr Corcoran        Additional Documentation  None    Medical Decision Making / Diagnosis     CMS Diagnoses:     If one the following conditions is present, a 30 mL/kg bolus is recommended as part of the 6 hour bundle (IBW can be used for BMI >30, or document refusal/contraindication):      1.   Initial hypotension  defined as 2 bps < 90 or map < 65 in the 6hrs before or 3hrs after time zero.     2.  Lactate >4.      The patient has signs of Severe Sepsis as evidenced by:    1. 2 SIRS criteria, AND  2. Suspected infection, AND   3. Organ dysfunction: Lactic Acidosis with value >2.0    Time severe sepsis diagnosis confirmed: 0820 08/29/24 as this was the time when Lactate resulted, and the level was > 2.0    3 Hour Severe Sepsis Bundle Completion:    1. Initial Lactic Acid Result:   Recent Labs   Lab Test 08/29/24  1048 08/29/24  0820 02/19/24  0519   LACT 1.9 3.8* 0.9     2. Blood Cultures before Antibiotics: Yes  Note: Due to a national blood culture bottle shortage, reduced blood cultures may have been drawn on this patient.  3. Broad Spectrum Antibiotics Administered:  yes       Anti-infectives (From admission through now)      Start     Dose/Rate Route Frequency Ordered Stop    08/29/24 0905  piperacillin-tazobactam (ZOSYN) 4.5 g vial to attach to  mL bag        Note to Pharmacy: For [facility], [facility] and [facility]: For Zosyn-naive patients, use the \"Zosyn initial dose + extended infusion\" order panel.    4.5 g  over 30 Minutes Intravenous ONCE 08/29/24 0900 08/29/24 1058            4. Is initial hypotension present?     No (IV fluid bolus NOT required). IV Fluid volume administered: 1000                  Severe Sepsis reassessment:  1. Repeat Lactic Acid Level within 6 hours of time zero: 1.9  2. MAP>65 after initial IVF bolus, will continue to monitor fluid status and vital signs        and " None    MIPS       None    MDM     89-year-old male with history of prior small bowel obstruction presents to the ER with abdominal discomfort.  Vital signs are reassuring.  Broad differential was considered including but not limited to electrolyte, metabolic and renal dissection, mesenteric ischemia, ischemic colitis, dehydration, perforation, etc.  CBC shows mild leukocytosis but no significant anemia.  BMP without electrolyte abnormalities but slight elevation in anion gap as well as BUN, fluids will be given.  Lactate minimally elevated 3.8 concern for possible sepsis given white count and lactate along with abdominal pain.  Dose of Zosyn given for possible severe sepsis in the setting of elevated lactate, abdominal pain, elevated white count.  Fortunately lactate did resolve with 1 L of fluids.  I suspect likely lactic acidosis secondary to dehydration as there is no identified infectious source at this time.  Blood culture was sent.  INR is therapeutic.  Urinalysis without infection.  CT angio shows no evidence of acute vascular emergency.  Per radiology lack of visualization of the VENANCIO is not clinically significant and there is no signs of mesenteric ischemia or ischemic colitis.  There is evidence of a distal small bowel obstruction. Patient admitted to the hospital.    Disposition   The patient was admitted to the hospital.     Diagnosis     ICD-10-CM    1. SBO (small bowel obstruction) (H)  K56.609            Discharge Medications   Current Discharge Medication List            Scribe Disclosure:  I, Rico Dixon, am serving as a scribe at 8:12 AM on 8/29/2024 to document services personally performed by Charisse Multani MD based on my observations and the provider's statements to me.        Charisse Multani MD  08/29/24 9694

## 2024-08-29 NOTE — ED NOTES
[facility]  ED Nurse Handoff Report    ED Chief complaint: Abdominal Pain  . ED Diagnosis:   Final diagnoses:   SBO (small bowel obstruction) (H)       Allergies: No Known Allergies    Code Status: Full Code    Activity level - Baseline/Home:  independent.  Activity Level - Current:   standby.   Lift room needed: No.   Bariatric: No   Needed: No   Isolation: No.   Infection: Not Applicable.     Respiratory status: Room air    Vital Signs (within 30 minutes):   Vitals:    08/29/24 1002 08/29/24 1017 08/29/24 1032 08/29/24 1130   BP: (!) 157/82 (!) 161/93 (!) 152/78 (!) 158/83   Pulse: 76 77 77 78   Resp:       Temp:       TempSrc:       SpO2:    97%   Weight:       Height:           Cardiac Rhythm:  ,      Pain level:    Patient confused: No.   Patient Falls Risk: nonskid shoes/slippers when out of bed, arm band in place, and patient and family education.   Elimination Status: Has voided     Patient Report - Initial Complaint: Abdominal pain.   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 The patient reports he began having abdominal pain last night around 8:00 PM and he has not had a bowel movement for the past two days. He reports the pain is a 7-8/10 severity and nothing he does makes it better or worse. He endorses some nausea. He denies vomiting, chest pain, shortness of breath, cough, rhinorrhea, sore throat, back pain, dysuria, or bloody/tarry stool. Of note, the patient lives independently at home.      Abnormal Results:   Labs Ordered and Resulted from Time of ED Arrival to Time of ED Departure   COMPREHENSIVE METABOLIC PANEL - Abnormal       Result Value    Sodium 137      Potassium 4.5      Carbon Dioxide (CO2) 22      Anion Gap 16 (*)     Urea Nitrogen 23.6 (*)     Creatinine  0.93      GFR Estimate 78      Calcium 9.3      Chloride 99      Glucose 168 (*)     Alkaline Phosphatase 71      AST 41      ALT 23      Protein Total 7.0      Albumin 4.4      Bilirubin Total 1.1     LACTIC ACID WHOLE BLOOD WITH 1X REPEAT IN 2 HR WHEN >2 - Abnormal    Lactic Acid, Initial 3.8 (*)    INR - Abnormal    INR 2.30 (*)    ROUTINE UA WITH MICROSCOPIC REFLEX TO CULTURE - Abnormal    Color Urine Yellow      Appearance Urine Slightly Cloudy (*)     Glucose Urine Negative      Bilirubin Urine Negative      Ketones Urine Negative      Specific Gravity Urine 1.024      Blood Urine Negative      pH Urine 7.5 (*)     Protein Albumin Urine 30 (*)     Urobilinogen Urine Normal      Nitrite Urine Negative      Leukocyte Esterase Urine Negative      Bacteria Urine Few (*)     Mucus Urine Present (*)     Amorphous Crystals Urine Few (*)     RBC Urine 3 (*)     WBC Urine 3      Squamous Epithelials Urine 1      Hyaline Casts Urine 1     CBC WITH PLATELETS AND DIFFERENTIAL - Abnormal    WBC Count 11.3 (*)     RBC Count 4.83      Hemoglobin 14.2      Hematocrit 43.8      MCV 91      MCH 29.4      MCHC 32.4      RDW 15.8 (*)     Platelet Count 311      % Neutrophils 92      % Lymphocytes 3      % Monocytes 4      % Eosinophils 0      % Basophils 0      % Immature Granulocytes 1      NRBCs per 100 WBC 0      Absolute Neutrophils 10.4 (*)     Absolute Lymphocytes 0.4 (*)     Absolute Monocytes 0.4      Absolute Eosinophils 0.0      Absolute Basophils 0.0      Absolute Immature Granulocytes 0.1      Absolute NRBCs 0.0     LIPASE - Normal    Lipase 29     LACTIC ACID WHOLE BLOOD - Normal    Lactic Acid 1.9     BLOOD CULTURE        CTA Abdomen Pelvis with Contrast   Final Result   IMPRESSION:    1. Distal small bowel obstruction   2. Nonobstructing left renal calculus.   3. Cholelithiasis.   4. 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            AYLEEN THACKER MD            SYSTEM ID:   [id number]          Treatments provided: Pain control  Family Comments: Daughter Guillermina at bedside.   OBS brochure/video discussed/provided to patient:  N/A  ED Medications:   Medications   ondansetron (ZOFRAN) injection 4 mg (4 mg Intravenous $Given 8/29/24 1018)   acetaminophen (TYLENOL) tablet 975 mg (975 mg Oral $Given 8/29/24 0819)   sodium chloride 0.9% BOLUS 1,000 mL (0 mLs Intravenous Stopped 8/29/24 1032)   piperacillin-tazobactam (ZOSYN) 4.5 g vial to attach to  mL bag (0 g Intravenous Stopped 8/29/24 1058)   iopamidol (ISOVUE-370) solution 500 mL (72 mLs Intravenous $Given 8/29/24 0936)   CT scan flush use (80 mLs As instructed $Given 8/29/24 0936)   HYDROmorphone (DILAUDID) injection 0.2 mg (0.2 mg Intravenous $Given 8/29/24 1018)       Drips infusing: N/A  For the majority of the shift this patient was Green.   Interventions performed were N/A.    Sepsis treatment initiated: No    Cares/treatment/interventions/medications to be completed following ED care: Pain Control. See Orders    ED Nurse Name: Kendal Mcrae RN  11:42 AM

## 2024-08-29 NOTE — H&P
[facility]  Internal Medicine  History and Physical      Patient Name: Salvador Costello MRN# [id number]   Age: 89 year old YOB: 1935     Date of Admission:8/29/2024    Primary care provider: Qing Nieves  Date of Service: 8/29/2024         Assessment and Plan:   Salvador Costello is a 89 year old male with a history of Former Smoker, HLD, Complete Heart Block s/p PPM, Valvular Heart Disease, Colon Cancer s/p Hemicolectomy, SBO who presents to the ED today with abdominal pain, nausea and dry heaves.      Recurrent SBO  History colon cancer  Patient reports since he was hospitalized in February for a Small Bowel Obstruction, his bowels never quite returned to normal.  He has intermittent abdominal pain since that time as well.  Last night he ate dinner and then developed constant, diffuse lower abdominal cramping with nausea and dry heaves.  Last BM was the evening of 8/27.  Hx of colon cancer s/p hemicolectomy in 1990.  Hx of SBO in February which was treated conservatively.  Afebrile, wbc 11.3, lactic acid 3.8->1.9 after IVF.  CTA abd/pelvis revealed a distal small bowel obstruction as well as showed no evidence of acute vascular emergency. Per radiology lack of visualization of the VENANCIO is not clinically significant and there is no signs of mesenteric ischemia.  - npo, IVF, antiemetics, analgesics prn  - General Surgery consult      S/p Mechanical AVR and MVR (2007)   PFO s/p closure (2007)   Complete heart block s/p dual-chamber PPM (10/2023)   Pt underwent mechanical mitral and aortic valve replacement in 2007 for severe mitral and aortic valve regurgitation.  Chronically on warfarin with INR goal 2.5.  INR on admission 2.3. He also had complete heart block resulting in syncope in October 2023 and had a PPM placed.  - pharmacy consulted for warfarin dosing    HLD  - resume Atorvastatin upon discharge    Former smoker  65-pack-year history    Hx Compression Fractures  Noted on imaging  today were a severe compression deformity T12. Mild compression superior endplate L2 and moderate compression L4.  Denies any recent falls.      CODE: full  Diet/IVF: npo, NS  GI ppx:  protonix  DVT ppx: on warfarin    Angela KEYESC  Physician Assistant   Hospitalist Service           Chief Complaint:   Abdominal Pain         HPI:   89 year old male with a history of Former Smoker, HLD, Complete Heart Block s/p PPM, Valvular Heart Disease, Colon Cancer s/p Hemicolectomy, SBO who presents to the ED today with abdominal pain, nausea and dry heaves.    Patient reports since he was hospitalized in February for a Small Bowel Obstruction, his bowels never quite returned to normal.  He has intermittent abdominal pain since that time as well.  Last night he ate dinner and then developed constant, diffuse lower abdominal cramping with nausea and dry heaves.  Last BM was the evening of 8/27.   Patient's daughter is at the bedside during out interview.          Past Medical History:     Past Medical History:   Diagnosis Date    Colon cancer 1990    S/p hemicolectomy    Complete heart block (H) 10/2023    S/p PPM    H/O mechanical aortic valve replacement 2007    H/O mitral valve replacement with mechanical valve 2007    Hyperlipidemia     Osteopenia           Past Surgical History:     Past Surgical History:   Procedure Laterality Date    Aortic valve replacement NOS      Colon cancer resection      COLONOSCOPY N/A 10/21/2014    Procedure: COLONOSCOPY;  Surgeon: Brett Reece MD;  Location:  GI    EP PACEMAKER DEVICE & LEAD IMPLANT- RIGHT ATRIAL & RIGHT VENTRICULAR N/A 10/27/2023    Procedure: Pacemaker Device & Lead Implant - Right Atrial & Right Ventricular;  Surgeon: Santosh Sanchez MD;  Location: [facility] CARDIAC CATH LAB    HERNIA REPAIR      Mitral valve replacement NOS            Social History:     Social History     Socioeconomic History    Marital status:      Spouse name: Not on file    Number  of children: Not on file    Years of education: Not on file    Highest education level: Not on file   Occupational History    Not on file   Tobacco Use    Smoking status: Former    Smokeless tobacco: Not on file   Substance and Sexual Activity    Alcohol use: Yes     Alcohol/week: 2.5 standard drinks of alcohol     Types: 3 Cans of beer per week     Comment: a beer a day    Drug use: Not on file    Sexual activity: Not on file   Other Topics Concern    Parent/sibling w/ CABG, MI or angioplasty before 65F 55M? Not Asked   Social History Narrative    Not on file     Social Determinants of Health     Financial Resource Strain: Low Risk  (7/18/2024)    Received from [employer]    Financial Resource Strain     Difficulty of Paying Living Expenses: 3     Difficulty of Paying Living Expenses: Not on file   Food Insecurity: No Food Insecurity (7/18/2024)    Received from [employer]    Food Insecurity     Worried About Running Out of Food in the Last Year: 1   Transportation Needs: No Transportation Needs (7/18/2024)    Received from [employer]    Transportation Needs     Lack of Transportation (Medical): 1   Physical Activity: Not on file   Stress: Not on file   Social Connections: Socially Integrated (7/18/2024)    Received from [employer]    Social Connections     Frequency of Communication with Friends and Family: 0   Interpersonal Safety: Not on file   Housing Stability: Low Risk  (7/18/2024)    Received from [employer]    Housing Stability     Unable to Pay for Housing in the Last Year: 1          Family History:   History reviewed. No pertinent family history.       Allergies:    No Known Allergies       Medications:     Prior to Admission medications    Medication Sig Last Dose Taking? Auth Provider Long Term End Date   atorvastatin (LIPITOR) 10 MG  "tablet Take 10 mg by mouth daily 8/28/2024 at am Yes Reported, Patient Yes    Vitamin D3 (CHOLECALCIFEROL) 25 mcg (1000 units) tablet Take 25 mcg by mouth daily. 8/28/2024 at am Yes Unknown, Entered By History     Warfarin Sodium (COUMADIN PO) Take by mouth daily Takes 2.5 mg Monday, 5 mg all other days of the week 8/28/2024 at @1715 Yes Reported, Patient            Review of Systems:   A complete ROS was performed and is negative other than what is stated in the HPI.       Physical Exam:   Blood pressure (!) 158/83, pulse 78, temperature 97.6  F (36.4  C), temperature source Temporal, resp. rate 20, height 1.803 m (5' 11\"), weight 62.6 kg (138 lb), SpO2 97%.  General: Alert, interactive, NAD  HEENT: AT/NC  Chest/Resp: clear to auscultation bilaterally, no crackles or wheezes  Heart/CV: regular rate and rhythm, no murmur  Abdomen/GI: Soft, mild diffuse BLQ tenderness, nondistended. Decreased BS.  No rebound or guarding.  Extremities/MSK: No LE edema  Skin: Warm and dry  Neuro: Alert & oriented x 3, no focal deficits, moves all extremities equally         Labs:   ROUTINE ICU LABS (Last four results)  CMP  Recent Labs   Lab 08/29/24  0820      POTASSIUM 4.5   CHLORIDE 99   CO2 22   ANIONGAP 16*   *   BUN 23.6*   CR 0.93   GFRESTIMATED 78   AUDELIA 9.3   PROTTOTAL 7.0   ALBUMIN 4.4   BILITOTAL 1.1   ALKPHOS 71   AST 41   ALT 23     CBC  Recent Labs   Lab 08/29/24  0820   WBC 11.3*   RBC 4.83   HGB 14.2   HCT 43.8   MCV 91   MCH 29.4   MCHC 32.4   RDW 15.8*        INR  Recent Labs   Lab 08/29/24  0820   INR 2.30*     Arterial Blood GasNo lab results found in last 7 days.       Imaging/Procedures:     Results for orders placed or performed during the hospital encounter of 08/29/24   CTA Abdomen Pelvis with Contrast    Narrative    [facility]  DATE: 8/29/2024    CTA ABDOMEN PELVIS WITH CONTRAST    INDICATION: Abdominal pain  TECHNIQUE: Helical acquisition through the abdomen and pelvis " was  performed during the arterial phase of contrast enhancement. 2D and 3D  reconstructions were performed by the CT technologist. Dose reduction  techniques were used.  IV CONTRAST: 72 cc  COMPARISON: CT abdomen dated 2/19/2024    FINDINGS:     CTA ABDOMEN/PELVIS: Celiac artery and superior mesenteric artery  patent. VENANCIO not identified. 2 renal arteries supply the right kidney.  Single renal artery supplies the left kidney. Calcified plaque  infrarenal abdominal aorta extending into the common iliac arteries  bilaterally. Common iliac, external iliac and internal iliac arteries  are patent bilaterally. Common femoral, proximal profunda femoral and  proximal superficial femoral arteries are patent bilaterally.    LUNG BASES: Bibasal atelectasis. Pacemaker leads.    HEPATOBILIARY: Cholelithiasis.    PANCREAS: Normal    SPLEEN: Normal.    ADRENAL GLANDS: Normal.    KIDNEYS/BLADDER: Tiny nonobstructing left renal calculus.    BOWEL: Fluid-filled stomach with distended loops of small bowel.  Decompressed colon. No free air. No bowel wall thickening.    LYMPH NODES: Normal.    PELVIC ORGANS: Prominent prostate.    MUSCULOSKELETAL: Degenerative change osseous structures. Severe  compression deformity T12. Mild compression superior endplate L2 and  moderate compression L4.      Impression    IMPRESSION:   1. Distal small bowel obstruction  2. Nonobstructing left renal calculus.  3. Cholelithiasis.  4. 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        AYLEEN THACKER MD         SYSTEM ID:  [id number]

## 2024-08-30 NOTE — PLAN OF CARE
"Goal Outcome Evaluation:  Pertinent assessments: A&Ox3. [place]. VSS. On RA. Assist x1 with walker &gait belt. Denied N/V. Abd. Soft & non tender. Active BS x4. No BM this shift but passing gas. Denied pain or SOB. LR infusing at 75ml/hr. NG tube placed for decompression. Abdominal x-ray verified with placement . Set to LIS with greenish output. New IV inserted.      Major Shift Events: NG, Abd x-ray done     Treatment Plan: NPO, IVF, NG tube, Lovenox, general surgery following      Plan of Care Reviewed With: patient  Overall Patient Progress: improvingOverall Patient Progress: improving  Outcome Evaluation: NG tube placed, Denied pain  Problem: Adult Inpatient Plan of Care  Goal: Plan of Care Review  Description: The Plan of Care Review/Shift note should be completed every shift.  The Outcome Evaluation is a brief statement about your assessment that the patient is improving, declining, or no change.  This information will be displayed automatically on your shift  note.  8/30/2024 1409 by Edie Jurado RN  Outcome: Progressing  Flowsheets (Taken 8/30/2024 1409)  Outcome Evaluation: NG tube placed, Denied pain  Plan of Care Reviewed With: patient  Overall Patient Progress: improving  8/30/2024 0829 by Edie Jurado RN  Outcome: Progressing  Flowsheets (Taken 8/30/2024 0829)  Outcome Evaluation: Arrived to the unit at 1451  Plan of Care Reviewed With: patient  Overall Patient Progress: no change  Goal: Patient-Specific Goal (Individualized)  Description: You can add care plan individualizations to a care plan. Examples of Individualization might be:  \"Parent requests to be called daily at 9am for status\", \"I have a hard time hearing out of my right ear\", or \"Do not touch me to wake me up as it startles  me\".  8/30/2024 1409 by dEie Jurado RN  Outcome: Progressing  8/30/2024 0829 by Edie Jurado RN  Outcome: Progressing  Goal: Absence of Hospital-Acquired Illness or Injury  8/30/2024 1409 by Edie Jurado, " RN  Outcome: Progressing  8/30/2024 0829 by Edie Jurado RN  Outcome: Progressing  Intervention: Identify and Manage Fall Risk  Recent Flowsheet Documentation  Taken 8/30/2024 0800 by Edie Jurado RN  Safety Promotion/Fall Prevention:   activity supervised   clutter free environment maintained   increased rounding and observation   increase visualization of patient   lighting adjusted   nonskid shoes/slippers when out of bed   patient and family education   room near nurse's station   safety round/check completed   supervised activity   treat reversible contributory factors  Intervention: Prevent Skin Injury  Recent Flowsheet Documentation  Taken 8/30/2024 0800 by Edie Jurado RN  Body Position: position changed independently  Intervention: Prevent and Manage VTE (Venous Thromboembolism) Risk  Recent Flowsheet Documentation  Taken 8/30/2024 0800 by Edie Jurado RN  VTE Prevention/Management: SCDs off (sequential compression devices)  Intervention: Prevent Infection  Recent Flowsheet Documentation  Taken 8/30/2024 0800 by Edie Jurado RN  Infection Prevention:   cohorting utilized   hand hygiene promoted   rest/sleep promoted   single patient room provided  Goal: Optimal Comfort and Wellbeing  8/30/2024 1409 by Edie Jurado RN  Outcome: Progressing  8/30/2024 0829 by Edie Jurado RN  Outcome: Progressing  Goal: Readiness for Transition of Care  8/30/2024 1409 by Edie Jurado RN  Outcome: Progressing  8/30/2024 0829 by Edie Jurado RN  Outcome: Progressing     Problem: Pain Acute  Goal: Optimal Pain Control and Function  8/30/2024 1409 by Edie Jurado RN  Outcome: Progressing  8/30/2024 0829 by Edie Jurado RN  Outcome: Progressing  Intervention: Prevent or Manage Pain  Recent Flowsheet Documentation  Taken 8/30/2024 0800 by Edie Jurado RN  Medication Review/Management: medications reviewed     Problem: Comorbidity Management  Goal: Maintenance of Heart Failure Symptom Control  8/30/2024 1409  by Edie Jurado, RN  Outcome: Progressing  8/30/2024 0829 by Edie Jurado, RN  Outcome: Progressing  Intervention: Maintain Heart Failure Management  Recent Flowsheet Documentation  Taken 8/30/2024 0800 by Edie Jurado, MARISOL  Medication Review/Management: medications reviewed     Problem: Constipation  Goal: Effective Bowel Elimination  8/30/2024 1409 by Edie Jurado, MARISOL  Outcome: Progressing  8/30/2024 0829 by Edie Jurado, RN  Outcome: Progressing

## 2024-08-30 NOTE — PLAN OF CARE
"Goal Outcome Evaluation:  Pertinent assessments: Arrived to the floor at 1451. Settled into his room. Was in BM upon arrival. Cleaned & repositioned. Oriented to his room. A&Ox3, [place]. VSS WNL. On RA. Denied Pain or N/V. Has gastrografin & NG tube orders in place. Informed oncoming nurse as it was end of shift.       Plan of Care Reviewed With: patient  Overall Patient Progress: no changeOverall Patient Progress: no change  Outcome Evaluation: Arrived to the unit at 1451    Problem: Adult Inpatient Plan of Care  Goal: Plan of Care Review  Description: The Plan of Care Review/Shift note should be completed every shift.  The Outcome Evaluation is a brief statement about your assessment that the patient is improving, declining, or no change.  This information will be displayed automatically on your shift  note.  Outcome: Progressing  Flowsheets (Taken 8/30/2024 0829)  Outcome Evaluation: Arrived to the unit at 1451  Plan of Care Reviewed With: patient  Overall Patient Progress: no change  Goal: Patient-Specific Goal (Individualized)  Description: You can add care plan individualizations to a care plan. Examples of Individualization might be:  \"Parent requests to be called daily at 9am for status\", \"I have a hard time hearing out of my right ear\", or \"Do not touch me to wake me up as it startles  me\".  Outcome: Progressing  Goal: Absence of Hospital-Acquired Illness or Injury  Outcome: Progressing  Intervention: Identify and Manage Fall Risk  Recent Flowsheet Documentation  Taken 8/30/2024 0800 by Edie Jurado RN  Safety Promotion/Fall Prevention:   activity supervised   clutter free environment maintained   increased rounding and observation   increase visualization of patient   lighting adjusted   nonskid shoes/slippers when out of bed   patient and family education   room near nurse's station   safety round/check completed   supervised activity   treat reversible contributory factors  Intervention: Prevent Skin " Injury  Recent Flowsheet Documentation  Taken 8/30/2024 0800 by Eide Jurado RN  Body Position: position changed independently  Intervention: Prevent and Manage VTE (Venous Thromboembolism) Risk  Recent Flowsheet Documentation  Taken 8/30/2024 0800 by Edie Jurado RN  VTE Prevention/Management: SCDs off (sequential compression devices)  Intervention: Prevent Infection  Recent Flowsheet Documentation  Taken 8/30/2024 0800 by Edie Jurado RN  Infection Prevention:   cohorting utilized   hand hygiene promoted   rest/sleep promoted   single patient room provided  Goal: Optimal Comfort and Wellbeing  Outcome: Progressing  Goal: Readiness for Transition of Care  Outcome: Progressing     Problem: Pain Acute  Goal: Optimal Pain Control and Function  Outcome: Progressing  Intervention: Prevent or Manage Pain  Recent Flowsheet Documentation  Taken 8/30/2024 0800 by Edie Jurado RN  Medication Review/Management: medications reviewed     Problem: Comorbidity Management  Goal: Maintenance of Heart Failure Symptom Control  Outcome: Progressing  Intervention: Maintain Heart Failure Management  Recent Flowsheet Documentation  Taken 8/30/2024 0800 by Edie Jurado RN  Medication Review/Management: medications reviewed     Problem: Constipation  Goal: Effective Bowel Elimination  Outcome: Progressing

## 2024-08-30 NOTE — PLAN OF CARE
"Pertinent assessments: A&O, confused at times. Incontinent of bowel and bladder. PIV saline locked. Cont NPO.     Major Shift Events Tylenol given for abd pain.     Treatment Plan: Awaiting gastrografin results.     Bedside Nurse: Jill Quarles RN       Problem: Adult Inpatient Plan of Care  Goal: Plan of Care Review  Description: The Plan of Care Review/Shift note should be completed every shift.  The Outcome Evaluation is a brief statement about your assessment that the patient is improving, declining, or no change.  This information will be displayed automatically on your shift  note.  8/30/2024 0621 by Jill Quarles RN  Outcome: Progressing  Flowsheets (Taken 8/30/2024 0621)  Plan of Care Reviewed With: patient  Overall Patient Progress: improving  8/30/2024 0117 by Jill Quarles RN  Outcome: Progressing  Flowsheets (Taken 8/30/2024 0117)  Plan of Care Reviewed With: patient  Overall Patient Progress: improving  Goal: Patient-Specific Goal (Individualized)  Description: You can add care plan individualizations to a care plan. Examples of Individualization might be:  \"Parent requests to be called daily at 9am for status\", \"I have a hard time hearing out of my right ear\", or \"Do not touch me to wake me up as it startles  me\".  8/30/2024 0621 by Jill Quarles RN  Outcome: Progressing  8/30/2024 0117 by Jill Quarles RN  Outcome: Progressing  Goal: Absence of Hospital-Acquired Illness or Injury  8/30/2024 0621 by Jill Quarles RN  Outcome: Progressing  8/30/2024 0117 by Jill Quarles RN  Outcome: Progressing  Intervention: Identify and Manage Fall Risk  Recent Flowsheet Documentation  Taken 8/30/2024 0200 by Jill Quarles RN  Safety Promotion/Fall Prevention:   activity supervised   nonskid shoes/slippers when out of bed   safety round/check completed  Intervention: Prevent and Manage VTE (Venous Thromboembolism) Risk  Recent Flowsheet Documentation  Taken 8/30/2024 0200 by " Smrekar, Jill L, RN  VTE Prevention/Management: SCDs off (sequential compression devices)  Intervention: Prevent Infection  Recent Flowsheet Documentation  Taken 8/30/2024 0200 by Jill Quarles RN  Infection Prevention:   single patient room provided   rest/sleep promoted  Goal: Optimal Comfort and Wellbeing  8/30/2024 0621 by Jill Quarles RN  Outcome: Progressing  8/30/2024 0117 by Jill Quarles RN  Outcome: Progressing  Goal: Readiness for Transition of Care  8/30/2024 0621 by Jill Quarles RN  Outcome: Progressing  8/30/2024 0117 by Jill Quarles RN  Outcome: Progressing     Problem: Pain Acute  Goal: Optimal Pain Control and Function  8/30/2024 0621 by Jill Quarles RN  Outcome: Progressing  8/30/2024 0117 by Jill Quarles RN  Outcome: Progressing     Problem: Comorbidity Management  Goal: Maintenance of Heart Failure Symptom Control  8/30/2024 0621 by Jill Quarles RN  Outcome: Progressing  8/30/2024 0117 by iJll Quarles RN  Outcome: Progressing     Problem: Constipation  Goal: Effective Bowel Elimination  Outcome: Progressing   Goal Outcome Evaluation:      Plan of Care Reviewed With: patient    Overall Patient Progress: improvingOverall Patient Progress: improving

## 2024-08-30 NOTE — PROGRESS NOTES
"[facility]  General Surgery Progress Note         Assessment and Plan:   Assessment:   Recurrent small bowel obstruction, likely related to adhesions  H/o colon cancer, s/p hemicolectomy   -8/29 GGC: no passage of contrast past the proximal small bowel  -Bowel activity: +BM this morning(likely downstream of obstruction point)      Plan:   -IV fluids: LR@75mL/hr  -NG placement underway, plan LIS bowel decompression and rest  -Pain management: acetaminophen, IV dilaudid prn  -Diet: NPO x meds  -Advance activity as tolerated  -Conservative measures.     Physician Attestation     I saw and evaluated Salvador Costello as part of a shared APRN/PA visit.     I personally reviewed the vital signs, medications, labs, and imaging.    I personally provided a substantive portion of care for this patient and I approve the care plan as written by the HALLIE.  I was involved with Medical Decision Making including: NG tube placed. Bowel obstruction persists. No signs of ischemia or increased pain. Will provide NG decompression today and re-attempt gastrografin challenge tomorrow. May need operative intervention if SBO is not resolving in the next 1-2 days.    Anastacia Manzanares MD  Date of Service (when I saw the patient): 08/30/24              Interval History:   Seated in chair, about to get NG placement completed.  Not passing flatus, no nausea this morning.           Physical Exam:   Blood pressure (!) 130/93, pulse 97, temperature 98.3  F (36.8  C), temperature source Oral, resp. rate 16, height 1.803 m (5' 11\"), weight 62.6 kg (138 lb), SpO2 92%.    No intake/output data recorded.    Constitutional:   awake, alert, cooperative, no apparent distress, and appears stated age     Abdomen: NG placement underway          Data:     Recent Labs   Lab 08/30/24  0644 08/29/24  0820   WBC 13.6* 11.3*   HGB 13.9 14.2   HCT 43.0 43.8   MCV 91 91    311     Recent Labs   Lab 08/30/24  0644 08/29/24  0820    137 "   POTASSIUM 4.7 4.5   CHLORIDE 104 99   CO2 22 22   ANIONGAP 13 16*   * 168*   BUN 31.9* 23.6*   CR 0.86 0.93   GFRESTIMATED 83 78   AUDELIA 9.1 9.3   MAG 2.3 2.2   PROTTOTAL  --  7.0   ALBUMIN  --  4.4   BILITOTAL  --  1.1   ALKPHOS  --  71   AST  --  41   ALT  --  23     8/30 1235am: XR GASTROGRAFIN CHALLENGE   IMPRESSION: Persistent massively dilated gas-filled small bowel loops. Administered oral contrast is residual within the dilated stomach and proximal dilated small bowel loops at 8 hours post administration. No colonic contrast present. Findings compatible with ongoing high-grade small bowel obstruction. Prior median sternotomy, cardiac valvular prostheses, and cardiac pacemaker. Lung bases are clear. Excreted contrast within the urinary bladder. Degenerative changes of the thoracic and lumbosacral spine. Degenerative changes of the hips. No acute osseous abnormality.    8/30 0835 ABDOMEN TWO VIEWS Preliminary  IMPRESSION:  1.  Dilated small bowel loops, measuring up to 5.8 cm in diameter.  Associated air-fluid levels. Oral contrast within the stomach and  multiple dilated small bowel loops. No colonic contrast is seen.  Findings consistent with persistent high-grade small bowel  obstruction. No free air.  2.  Excreted contrast within the urinary bladder. Mild bibasilar  platelike atelectasis/scarring. 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 Partially visualized pacemaker  leads. Unchanged compression deformity at the thoracolumbar junction.      [facility] [employer] message, 8 am to 4 pm  After 4 pm, call [phone]

## 2024-08-30 NOTE — PROGRESS NOTES
[facility]    Medicine Progress Note - Hospitalist Service    Date of Admission:  8/29/2024    Assessment & Plan     Salvador Costello is a 89 year old male with a history of Former Smoker, HLD, Complete Heart Block s/p PPM, Valvular Heart Disease, Colon Cancer s/p Hemicolectomy, SBO who presents to the ED  with abdominal pain, nausea and dry heaves found to have distal small bowel obstruction on CT abdomen and pelvis.        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  He has intermittent abdominal pain since that time as well.  The night to admission he ate dinner and then developed constant, diffuse lower abdominal cramping with nausea and dry heaves.  Last BM was the evening of 8/27.  Hx of colon cancer s/p hemicolectomy in 1990.  Hx of SBO in February which was treated conservatively.  Afebrile, wbc 11.3, lactic acid 3.8->1.9 after IVF.  CTA abd/pelvis revealed a distal small bowel obstruction as well as showed no evidence of acute vascular emergency. Per radiology lack of visualization of the VENANCIO is not clinically significant and there is no signs of mesenteric ischemia.  - npo, IVF, antiemetics, analgesics prn  - General Surgery consulted, appreciated recommendations  -Patient underwent Gastrografin challenge: No passage of contrast passed the proximal small bowel.  -NG placed however not certain if this would be helpful as patient appears to have obstruction distally     S/p Mechanical AVR and MVR (2007)   PFO s/p closure (2007)   Complete heart block s/p dual-chamber PPM (10/2023)   Pt underwent mechanical mitral and aortic valve replacement in 2007 for severe mitral and aortic valve regurgitation.  Chronically on warfarin with INR goal 2.5.  INR on admission 2.3. He also had complete heart block  resulting in syncope in October 2023 and had a PPM placed.  - pharmacy consulted for warfarin dosing.  If patient were to undergo surgery would place him on IV heparin.        Leukocytosis  WBC increasing from 11.3-13.6 today.  Likely stress response.   -Monitor CBC    HLD  - resume Atorvastatin upon discharge     Former smoker  65-pack-year history     Hx Compression Fractures  Noted on imaging today were a severe compression deformity T12. Mild compression superior endplate L2 and moderate compression L4.  Denies any recent falls.           Diet: NPO for Medical/Clinical Reasons Except for: Meds    DVT Prophylaxis: Warfarin  Palm Catheter: Not present  Lines: None     Cardiac Monitoring: None  Code Status: Full Code      Clinically Significant Risk Factors               # Coagulation Defect: INR = 2.56 (Ref range: 0.85 - 1.15) and/or PTT = N/A, will monitor for bleeding                   # Pacemaker present             Disposition Plan     Medically Ready for Discharge: Anticipated in 2-4 Days             Neisha Corcoran MD  Hospitalist Service  [facility]  Securely message with [employer] (more info)  Text page via [employer] Paging/Directory   ______________________________________________________________________    Interval History   No acute events overnight.  Patient denied having any abdominal pain.  He is not passing gases.  No bowel movement yet.  No nausea or vomiting.  He feels hungry said that he has not eaten for about 3 days and wants to eat.  He is amenable to stay n.p.o..     Point review of systems otherwise negative    Physical Exam   Vital Signs: Temp: 98.3  F (36.8  C) Temp src: Oral BP: (!) 154/86 Pulse: 95   Resp: 16 SpO2: 93 % O2 Device: None (Room air)    Weight: 138 lbs 0 oz    Constitutional: awake, alert, cooperative, no apparent distress, and appears stated age  Eyes: Anicteric sclera  ENT: normocephalic, without obvious abnormality  Respiratory: On room air, equal air entry  bilaterally, no wheezing or crackle  Cardiovascular: Normal rate, regular rhythm, no murmur  GI: Scaphoid abdomen, nontender, nondistended, absent bowel sounds  Musculoskeletal: no lower extremity pitting edema present  Neurologic: Awake, alert, oriented to name, place and time  Neuropsychiatric: Appropriate mood and affect    Medical Decision Making       45 MINUTES SPENT BY ME on the date of service doing chart review, history, exam, documentation & further activities per the note.      Data   ------------------------- PAST 24 HR DATA REVIEWED -----------------------------------------------

## 2024-08-30 NOTE — CONSULTS
General Surgery Consultation    Salvador Costello MRN# [id number]   Age: 89 year old YOB: 1935     Date of Admission:  8/29/2024    Reason for consult:            Small bowel obstruction       Requesting physician: Angela Llanos PA-C                Assessment and Plan:   Assessment:   Salvador Costello is a 89 year old male with history of colon cancer s/p hemicolectomy in the 1990s, now admitted with a small bowel obstruction. He had a similar obstruction ~6 months ago which resolved with conservative measures after a gastrografin challenge after ~ 24 hours.         Plan:   - Continue conservative management with bowel rest, NPO IVF. I have recommended NG tube placement given the large amount of dilated bowel on his CT and his degree of distension, but the patient would like to hold off on NG placement since he did not need one last time.   - Gastografin challenge today  - Surgical intervention may be needed if the clinical picture worsens or if the patient fails to progress with conservative measures.          Chief Complaint:   Small bowel obstruction    History is obtained from the patient         History of Present Illness:   Salvador Costello is a 89 year old  male who presents with abdominal pain diffusely for the past 2 days.  The pain is intermittent and dull and cramping. He has been nauseated, but has not vomited. He does not recall passing any flatus today and no bowel movement for 2 days prior to admission.            Past Medical History:     Past Medical History:   Diagnosis Date    Colon cancer 1990    S/p hemicolectomy    Complete heart block (H) 10/2023    S/p PPM    H/O mechanical aortic valve replacement 2007    H/O mitral valve replacement with mechanical valve 2007    Hyperlipidemia     Osteopenia              Past Surgical History:     Past Surgical History:   Procedure Laterality Date    Aortic valve replacement NOS      Colon cancer resection      COLONOSCOPY N/A 10/21/2014     Procedure: COLONOSCOPY;  Surgeon: Brett Reece MD;  Location:  GI    EP PACEMAKER DEVICE & LEAD IMPLANT- RIGHT ATRIAL & RIGHT VENTRICULAR N/A 10/27/2023    Procedure: Pacemaker Device & Lead Implant - Right Atrial & Right Ventricular;  Surgeon: Santosh Sanchez MD;  Location:  HEART CARDIAC CATH LAB    HERNIA REPAIR      Mitral valve replacement NOS               Social History:     Social History     Tobacco Use    Smoking status: Former    Smokeless tobacco: Not on file   Substance Use Topics    Alcohol use: Yes     Alcohol/week: 2.5 standard drinks of alcohol     Types: 3 Cans of beer per week     Comment: a beer a day             Family History:   History reviewed. No pertinent family history.         Allergies:   No Known Allergies          Medications:     Current Facility-Administered Medications   Medication Dose Route Frequency Provider Last Rate Last Admin    acetaminophen (TYLENOL) tablet 650 mg  650 mg Oral Q4H PRN Angela Llanos PA-C        enoxaparin ANTICOAGULANT (LOVENOX) injection 50 mg  0.75 mg/kg Subcutaneous Q12H Anastacia Manzanares MD   50 mg at 08/29/24 1857    HYDROmorphone (DILAUDID) injection 0.2 mg  0.2 mg Intravenous Q2H PRN Angela Llanos PA-C   0.2 mg at 08/29/24 1646    HYDROmorphone (DILAUDID) injection 0.4 mg  0.4 mg Intravenous Q2H PRN Angela Llanos PA-C        lidocaine (LMX4) cream   Topical Q1H PRN Angela Llanos PA-C        lidocaine 1 % 0.1-1 mL  0.1-1 mL Other Q1H PRN Angela Llanos PA-C        naloxone (NARCAN) injection 0.2 mg  0.2 mg Intravenous Q2 Min PRN Neisha Corcoran MD        Or    naloxone (NARCAN) injection 0.4 mg  0.4 mg Intravenous Q2 Min PRN Neisha Corcoran MD        Or    naloxone (NARCAN) injection 0.2 mg  0.2 mg Intramuscular Q2 Min PRNeisha Mcfadden MD        Or    naloxone (NARCAN) injection 0.4 mg  0.4 mg Intramuscular Q2 Min PRN Neisha Corcoran MD        ondansetron (ZOFRAN ODT) ODT tab 4 mg  4 mg Oral Q6H PRN Angela Llanos PA-C         "Or    ondansetron (ZOFRAN) injection 4 mg  4 mg Intravenous Q6H PRN Angela Llanos PA-C        oxyCODONE (ROXICODONE) tablet 5 mg  5 mg Oral Q4H PRN Angela Llanos PA-C        oxyCODONE IR (ROXICODONE) half-tab 2.5 mg  2.5 mg Oral Q4H PRN Angela Llanos PA-C        pantoprazole (PROTONIX) IV push injection 40 mg  40 mg Intravenous Q24H Angela Llanos PA-C   40 mg at 08/29/24 1429    Patient is already receiving anticoagulation with heparin, enoxaparin (LOVENOX), warfarin (COUMADIN)  or other anticoagulant medication   Does not apply Continuous PRN Angela Llanos PA-C        sodium chloride (PF) 0.9% PF flush 3 mL  3 mL Intracatheter Q8H Angela Llanos PA-C   3 mL at 08/29/24 1900    sodium chloride (PF) 0.9% PF flush 3 mL  3 mL Intracatheter q1 min prn Angela Llanos PA-C        Warfarin Dose Required Daily - Pharmacist Managed  1 each Oral See Admin Instructions Angela Llanos PA-C         Current Facility-Administered Medications   Medication Dose Route Frequency Provider Last Rate Last Admin    enoxaparin ANTICOAGULANT (LOVENOX) injection 50 mg  0.75 mg/kg Subcutaneous Q12H Anastacia Manzanares MD   50 mg at 08/29/24 1857    pantoprazole (PROTONIX) IV push injection 40 mg  40 mg Intravenous Q24H Angela Llanos PA-C   40 mg at 08/29/24 1429    sodium chloride (PF) 0.9% PF flush 3 mL  3 mL Intracatheter Q8H Angela Llanos PA-C   3 mL at 08/29/24 1900    Warfarin Dose Required Daily - Pharmacist Managed  1 each Oral See Admin Instructions Angela Llanos PA-C                Review of Systems:   The 10 point review of systems is negative other than noted in the HPI.          Physical Exam:   BP (!) 172/106 (BP Location: Right arm)   Pulse 86   Temp 98.4  F (36.9  C) (Oral)   Resp 16   Ht 1.803 m (5' 11\")   Wt 62.6 kg (138 lb)   SpO2 93%   BMI 19.25 kg/m    General - Well developed, thin male in no apparent distress  Eyes:  no scleral icterus or redness  Lymphatic: No " cervical, or supraclavicular lymphadenopathy  Lungs: Clear to auscultation bilaterally  Heart: regular rate and rhythm, no murmurs  Abdomen: rounded, distended with no tenderness noted.  No rebound or guarding.  no masses palpated.  MSK: Extremities warm without edema  Neurologic: nonfocal  Psychiatric: Mood and affect appropriate  Skin: Without lesions, rashes, or jaundice         Data:   Labs reviewed  Recent Labs   Lab 08/29/24  0820   WBC 11.3*   HGB 14.2   HCT 43.8   MCV 91        Recent Labs   Lab 08/29/24  0820      POTASSIUM 4.5   CHLORIDE 99   CO2 22   ANIONGAP 16*   *   BUN 23.6*   CR 0.93   GFRESTIMATED 78   AUDELIA 9.3   MAG 2.2   PROTTOTAL 7.0   ALBUMIN 4.4   BILITOTAL 1.1   ALKPHOS 71   AST 41   ALT 23     Recent Labs   Lab 08/29/24  1048 08/29/24  0820   LACT 1.9 3.8*     Recent Labs   Lab 08/29/24  0820   LIPASE 29     Imaging:  All imaging studies reviewed by me and my interpretation of the Ct abd/pelvis is: small bowel obstruction with dilated loops of small bowel and decompressed distal loops    Anastacia Manzanares MD  8:18 PM 8/29/24    Time spent with the patient, reviewing the EMR, reviewing laboratory and imaging studies,   counseling and coordinating care:  45 minutes.

## 2024-08-30 NOTE — PLAN OF CARE
"Pertinent assessments: VSS on room air. Afebrile. Disoriented to situation at times. Forgetful. Up with SBA, gait belt, and walker. NPO except for meds. Saline locked PIV.     Major Shift Events: Admission from the ER. Took PO contrast. Gastrografin imaging pending.    Treatment Plan: Gastrografin challenge, NPO, Surgery following, Lovenox q12h, and discharge pending.    Bedside Nurse: Juan Zuniga RN    Problem: Adult Inpatient Plan of Care  Goal: Plan of Care Review  Description: The Plan of Care Review/Shift note should be completed every shift.  The Outcome Evaluation is a brief statement about your assessment that the patient is improving, declining, or no change.  This information will be displayed automatically on your shift  note.  Outcome: Not Progressing  Flowsheets (Taken 8/29/2024 9525)  Outcome Evaluation: Gastrografin challenge in process.  Plan of Care Reviewed With:   patient   child  Overall Patient Progress: no change  Goal: Patient-Specific Goal (Individualized)  Description: You can add care plan individualizations to a care plan. Examples of Individualization might be:  \"Parent requests to be called daily at 9am for status\", \"I have a hard time hearing out of my right ear\", or \"Do not touch me to wake me up as it startles  me\".  Outcome: Not Progressing  Goal: Absence of Hospital-Acquired Illness or Injury  Outcome: Not Progressing  Intervention: Identify and Manage Fall Risk  Recent Flowsheet Documentation  Taken 8/29/2024 2107 by Juan Zuniga, RN  Safety Promotion/Fall Prevention:   activity supervised   clutter free environment maintained   increased rounding and observation   increase visualization of patient   lighting adjusted   nonskid shoes/slippers when out of bed   patient and family education   room near nurse's station   safety round/check completed   supervised activity   treat reversible contributory factors  Intervention: Prevent Skin Injury  Recent Flowsheet " Documentation  Taken 8/29/2024 1857 by Juan Zuniga RN  Body Position: position changed independently  Intervention: Prevent and Manage VTE (Venous Thromboembolism) Risk  Recent Flowsheet Documentation  Taken 8/29/2024 1857 by Juan Zuniga RN  VTE Prevention/Management: SCDs off (sequential compression devices)  Intervention: Prevent Infection  Recent Flowsheet Documentation  Taken 8/29/2024 1857 by Juan Zuniga RN  Infection Prevention:   cohorting utilized   hand hygiene promoted   rest/sleep promoted   single patient room provided  Goal: Optimal Comfort and Wellbeing  Outcome: Not Progressing  Intervention: Monitor Pain and Promote Comfort  Recent Flowsheet Documentation  Taken 8/29/2024 1646 by Juan Zuniga RN  Pain Management Interventions: medication (see MAR)  Goal: Readiness for Transition of Care  Outcome: Not Progressing  Intervention: Mutually Develop Transition Plan  Recent Flowsheet Documentation  Taken 8/29/2024 2048 by Juan Zuniga RN  Equipment Currently Used at Home: walker, rolling     Goal Outcome Evaluation:      Plan of Care Reviewed With: patient, child    Overall Patient Progress: no change    Overall Patient Progress: no change    Outcome Evaluation: Gastrografin challenge in process.

## 2024-08-31 NOTE — PROGRESS NOTES
[facility]    Medicine Progress Note - Hospitalist Service    Date of Admission:  8/29/2024    Assessment & Plan     89-year-old male with history of mechanical AVR and MVR, dual-chamber pacemaker due to heart block, colon cancer status post hemicolectomy who presented with nausea and dry heaves and was found to have distal small bowel obstruction on CT scan.      Recurrent small bowel obstruction.  Secondary to adhesions in patient with history of hemicolectomy 1990.  General surgery consult appreciated, failed Gastrografin challenge.  NG placed.  Not passing any gas, planning due to adhesiolysis.     Status post mechanical AVR and MVR.  PFO closure and PPM for complete heart block.  Reverse Coumadin with vitamin K 5 mg IV x 1.  Was getting Lovenox, will switch to heparin drip tonight to stop at 6 AM in case she needs surgery tomorrow.    Chronic medical conditions  Hyperlipidemia: Can resume Lipitor on discharge.  History of compression fractures: Severe compression fracture deformity of T12 and mild compression superior endplate L2 and moderate L4 fracture.  No recent falls.          Diet: NPO for Medical/Clinical Reasons Except for: Meds    DVT Prophylaxis: Heparin drip.  Palm Catheter: Not present  Lines: None     Cardiac Monitoring: None  Code Status: Full Code      Clinically Significant Risk Factors               # Coagulation Defect: INR = 2.13 (Ref range: 0.85 - 1.15) and/or PTT = N/A, will monitor for bleeding                   # Pacemaker present         Disposition Plan     Medically Ready for Discharge: Anticipated in 2-4 Days        Eric Rocha MD  Hospitalist Service  [facility]  Securely message with [employer] (more info)  Text page via [employer] Paging/Directory   ______________________________________________________________________    Interval History   Not passing gas    Physical Exam   Vital Signs: Temp: 98.2  F (36.8  C) Temp src: Oral BP: (!) 147/90  Pulse: 95   Resp: 16 SpO2: 94 % O2 Device: None (Room air)    Weight: 138 lbs 0 oz    General Appearance: Alert awake and oriented x 3.  Frail elderly male in no acute distress.  Has NG tube  Respiratory: Clear to auscultation  Cardiovascular: S1-S2 normal  GI: Mildly distended  Skin: No rash  Other: No edema      Medical Decision Making       MANAGEMENT DISCUSSED with the following over the past 24 hours: Patient, surgical team, RN and pharmacy       Data     I have personally reviewed the following data over the past 24 hrs:    6.4  \   13.6   / 262     141 103 38.2 (H) /  142 (H)   4.2 26 0.76 \     INR:  2.13 (H) PTT:  N/A   D-dimer:  N/A Fibrinogen:  N/A       Imaging results reviewed over the past 24 hrs:   Recent Results (from the past 24 hour(s))   XR Abdomen 2 Views    Narrative    EXAM: XR ABDOMEN 2 VIEWS  LOCATION: [facility]  DATE: 8/31/2024    INDICATION: Small bowel obstruction  COMPARISON: Abdominal radiograph 8/30/2024.      Impression    IMPRESSION: Persistent distention of small bowel loops with fluid levels on the upright view, compatible with persistent mechanical obstruction, not significantly changed from prior exam. While there is gas within the stomach and colon under the   hemidiaphragm, there is no definite pneumoperitoneum. Nasogastric tube with tip and sidehole overlying the stomach. Stable postsurgical changes in the heart. No acute bony abnormality.

## 2024-08-31 NOTE — PLAN OF CARE
"To Do:  End of Shift Summary  For vital signs and complete assessments, please see documentation flowsheets.     Pertinent assessments: A&Ox3. VSS on RA. Denies pain, nausea, SOB. NG to LIS with dark brown output. BS active, passing small amounts of gas, no BM this shift. LR infusing at 75mL/hr.   Major Shift Events: NG to LIS  Treatment Plan: NPO, IVF, NG tube, Lovenox, general surgery following   Bedside Nurse: Sulma Godfrey RN     Goal Outcome Evaluation:      Plan of Care Reviewed With: patient    Overall Patient Progress: improvingOverall Patient Progress: improving    Outcome Evaluation: Denies nausea, passing small amounts of gas. No BM this shift.      Problem: Adult Inpatient Plan of Care  Goal: Plan of Care Review  Description: The Plan of Care Review/Shift note should be completed every shift.  The Outcome Evaluation is a brief statement about your assessment that the patient is improving, declining, or no change.  This information will be displayed automatically on your shift  note.  Outcome: Progressing  Flowsheets (Taken 8/30/2024 1568)  Outcome Evaluation: Denies nausea, passing small amounts of gas. No BM this shift.  Plan of Care Reviewed With: patient  Overall Patient Progress: improving  Goal: Patient-Specific Goal (Individualized)  Description: You can add care plan individualizations to a care plan. Examples of Individualization might be:  \"Parent requests to be called daily at 9am for status\", \"I have a hard time hearing out of my right ear\", or \"Do not touch me to wake me up as it startles  me\".  Outcome: Progressing  Goal: Absence of Hospital-Acquired Illness or Injury  Outcome: Progressing  Intervention: Identify and Manage Fall Risk  Recent Flowsheet Documentation  Taken 8/30/2024 1730 by Ivy Godfrey RN  Safety Promotion/Fall Prevention:   activity supervised   increased rounding and observation   increase visualization of patient   room near nurse's station   supervised " activity  Intervention: Prevent and Manage VTE (Venous Thromboembolism) Risk  Recent Flowsheet Documentation  Taken 8/30/2024 1730 by Ivy Godfrey RN  VTE Prevention/Management: SCDs off (sequential compression devices)  Intervention: Prevent Infection  Recent Flowsheet Documentation  Taken 8/30/2024 1730 by Ivy Godfrey RN  Infection Prevention:   cohorting utilized   hand hygiene promoted   rest/sleep promoted   single patient room provided  Goal: Optimal Comfort and Wellbeing  Outcome: Progressing  Goal: Readiness for Transition of Care  Outcome: Progressing     Problem: Pain Acute  Goal: Optimal Pain Control and Function  Outcome: Progressing  Intervention: Prevent or Manage Pain  Recent Flowsheet Documentation  Taken 8/30/2024 1730 by Ivy Godfrey RN  Medication Review/Management: medications reviewed     Problem: Comorbidity Management  Goal: Maintenance of Heart Failure Symptom Control  Outcome: Progressing  Intervention: Maintain Heart Failure Management  Recent Flowsheet Documentation  Taken 8/30/2024 1730 by Ivy Godfrey RN  Medication Review/Management: medications reviewed     Problem: Constipation  Goal: Effective Bowel Elimination  Outcome: Progressing

## 2024-08-31 NOTE — PROGRESS NOTES
"[facility]  General Surgery Progress Note         Assessment and Plan:   Assessment:   Recurrent small bowel obstruction, likely related to adhesions  H/o colon cancer, s/p hemicolectomy   -8/29 GGC: no passage of contrast past the proximal small bowel  -Bowel activity: Some flatus yesterday      Plan:   -IV fluids: LR@75mL/hr  -NG tube. Repeat gastrografin challenge today  -Pain management: acetaminophen, IV dilaudid prn  -Diet: NPO x meds  -Advance activity as tolerated  -Conservative measures.    If patient does not pass repeat GGC, may need operative intervention in the next 1-2 days. PLEASE HOLD WARFARIN and GIVE VITAMIN K in preparation for possible surgery.  Okay for heparin drip, please hold at 6 AM.    Anastacia Manzanares MD           Interval History:   Seated in chair, denies pain or nausea, but mildly tender with palpation. Reportedly passing small amounts of flatus          Physical Exam:   Blood pressure (!) 157/83, pulse 95, temperature 97.4  F (36.3  C), temperature source Oral, resp. rate 16, height 1.803 m (5' 11\"), weight 62.6 kg (138 lb), SpO2 94%.    I/O last 3 completed shifts:  In: 1405 [I.V.:1405]  Out: 1150 [Urine:150; Emesis/NG output:1000]    Constitutional:   awake, alert, cooperative, no apparent distress, and appears stated age     Abdomen: Distended, mildly tender throughout, no peritonitis          Data:     Recent Labs   Lab 08/31/24  0700 08/30/24  0644 08/29/24  0820   WBC 6.2 13.6* 11.3*   HGB 13.7 13.9 14.2   HCT 41.9 43.0 43.8   MCV 91 91 91    310 311     Recent Labs   Lab 08/31/24  0700 08/30/24  0644 08/29/24  0820    139 137   POTASSIUM 4.2 4.7 4.5   CHLORIDE 103 104 99   CO2 26 22 22   ANIONGAP 12 13 16*   * 169* 168*   BUN 38.2* 31.9* 23.6*   CR 0.76 0.86 0.93   GFRESTIMATED 86 83 78   AUDELIA 8.5* 9.1 9.3   MAG 2.3 2.3 2.2   PROTTOTAL  --   --  7.0   ALBUMIN  --   --  4.4   BILITOTAL  --   --  1.1   ALKPHOS  --   --  71   AST  --   --  41   ALT  -- "   --  23     8/30 1235am: XR GASTROGRAFIN CHALLENGE   IMPRESSION: Persistent massively dilated gas-filled small bowel loops. Administered oral contrast is residual within the dilated stomach and proximal dilated small bowel loops at 8 hours post administration. No colonic contrast present. Findings compatible with ongoing high-grade small bowel obstruction. Prior median sternotomy, cardiac valvular prostheses, and cardiac pacemaker. Lung bases are clear. Excreted contrast within the urinary bladder. Degenerative changes of the thoracic and lumbosacral spine. Degenerative changes of the hips. No acute osseous abnormality.    8/30 0835 ABDOMEN TWO VIEWS Preliminary  IMPRESSION:  1.  Dilated small bowel loops, measuring up to 5.8 cm in diameter.  Associated air-fluid levels. Oral contrast within the stomach and  multiple dilated small bowel loops. No colonic contrast is seen.  Findings consistent with persistent high-grade small bowel  obstruction. No free air.  2.  Excreted contrast within the urinary bladder. Mild bibasilar  platelike atelectasis/scarring. 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 Partially visualized pacemaker  leads. Unchanged compression deformity at the thoracolumbar junction.

## 2024-08-31 NOTE — PLAN OF CARE
"End of Shift Summary  For vital signs and complete assessments, please see documentation flowsheets.     Pertinent assessments: VSS except elevated BP. Denies pain, nausea, SOB. NG to LIS with dark brown output. BS hypo, passing small amounts of gas   Major Shift Events:   Treatment Plan: NPO, IVF, NG to LIS,  Lovenox, general surgery following      Plan of Care Reviewed With: patient    Overall Patient Progress: improving    Outcome Evaluation: Denies pain or nausea. Bowel sounds hypoactive, passing small amount of flatus.      Problem: Adult Inpatient Plan of Care  Goal: Plan of Care Review  Description: The Plan of Care Review/Shift note should be completed every shift.  The Outcome Evaluation is a brief statement about your assessment that the patient is improving, declining, or no change.  This information will be displayed automatically on your shift  note.  Outcome: Progressing  Flowsheets (Taken 8/31/2024 0734)  Outcome Evaluation: Denies pain or nausea. Bowel sounds hypoactive, passing small amount of flatus.  Plan of Care Reviewed With: patient  Overall Patient Progress: improving  Goal: Patient-Specific Goal (Individualized)  Description: You can add care plan individualizations to a care plan. Examples of Individualization might be:  \"Parent requests to be called daily at 9am for status\", \"I have a hard time hearing out of my right ear\", or \"Do not touch me to wake me up as it startles  me\".  Outcome: Progressing  Goal: Absence of Hospital-Acquired Illness or Injury  Outcome: Progressing  Intervention: Identify and Manage Fall Risk  Recent Flowsheet Documentation  Taken 8/31/2024 0130 by Laura Jones, RN  Safety Promotion/Fall Prevention: safety round/check completed  Intervention: Prevent Skin Injury  Recent Flowsheet Documentation  Taken 8/31/2024 0130 by Laura Jones, RN  Body Position: position changed independently  Intervention: Prevent Infection  Recent Flowsheet Documentation  Taken 8/31/2024 " 0130 by Laura Jones RN  Infection Prevention: rest/sleep promoted  Goal: Optimal Comfort and Wellbeing  Outcome: Progressing  Goal: Readiness for Transition of Care  Outcome: Progressing     Problem: Pain Acute  Goal: Optimal Pain Control and Function  Outcome: Progressing  Intervention: Prevent or Manage Pain  Recent Flowsheet Documentation  Taken 8/31/2024 0130 by Laura Jones RN  Medication Review/Management: medications reviewed     Problem: Comorbidity Management  Goal: Maintenance of Heart Failure Symptom Control  Outcome: Progressing  Intervention: Maintain Heart Failure Management  Recent Flowsheet Documentation  Taken 8/31/2024 0130 by Laura Jones RN  Medication Review/Management: medications reviewed     Problem: Constipation  Goal: Effective Bowel Elimination  Outcome: Progressing

## 2024-09-01 PROBLEM — N17.0 ACUTE KIDNEY FAILURE WITH TUBULAR NECROSIS (H): Status: ACTIVE | Noted: 2024-01-01

## 2024-09-01 NOTE — ANESTHESIA PROCEDURE NOTES
Central Line/PA Catheter Placement    Pre-Procedure   Staff -        Performed By: anesthesiologist       Referred By: MD Glenna       Pre-Anesthestic Checklist: patient identified, IV checked, site marked, risks and benefits discussed, informed consent, monitors and equipment checked, pre-op evaluation and at physician/surgeon's request  Timeout:       Correct Patient: Yes        Correct Procedure: Yes        Correct Site: Yes        Correct Position: Yes        Correct Laterality: Yes   Line Placement:   This line was placed Post Induction starting at 9/1/2024 1:51 PM and ending at 9/1/2024 2:05 PM    Procedure   Procedure: central line       Laterality: right       Insertion Site: internal jugular.       Patient Position: Trendelenburg  Sterile Prep        All elements of maximal sterile barrier technique followed       Patient Prep/Sterile Barriers: draped, hand hygiene, gloves , hat , mask , draped, gown, sterile gel and probe cover       Skin prep: Chloraprep  Insertion/Injection        Technique: ultrasound guided and Seldinger Technique        1. Ultrasound was used to evaluate the access site.       2. Vein evaluated via ultrasound for patency/adequacy.       3. Using real-time ultrasound the needle/catheter was observed entering the artery/vein.       Type: CVC       Catheter Size: 7 Fr       Catheter Length: 20       Number of Lumens: triple lumen  Narrative         Secured by: suture       Biopatch and Tegaderm dressing used.       Complications: None apparent,        blood aspirated from all lumens,        Verification method: X-ray   Comments:  Attempted left internal jugular, unable to advance the cathter. Proceeded to right internal jugular.  COLIN Begum

## 2024-09-01 NOTE — PROGRESS NOTES
Cross Cover    Called for loss of IV access and inability to restart despite multiple attempts  On heparin drip for mechanical MVR & AVR.  Was on enox after warfarin reversed then switched to heparin drip due to possible need for surgery this am for refractory SBO    Last Anti xa therapeutic at 0.47 @ 130 am.    Stat Anti xa level now  Requested RN to call anesthesia to see if they could restart  If not will need midline placed ASAP this am

## 2024-09-01 NOTE — PROGRESS NOTES
Respiratory Therapy Note    RT paged to PACU to place pt on ventilator. Patient placed on mechanical ventilator at 1415.     [facility] ICU VENTILATOR RESPIRATORY NOTE  Date of Admission: 8/29/24  Date of Intubation (most recent): 9/1/24  Reason for Mechanical Ventilation: Post Op  Number of Days on Mechanical Ventilation: 1  Met Criteria for Pressure Support Trial: No  Reason for No Pressure Support Trial: Patient on pressor and FiO2 > 50%  ETT appearance on chest x-ray: Interval placement of endotracheal tube with tip located 3.5 cm above the page.    Plan:  Continue to monitor patient's respiratory status.    Resp: 12, Vent Mode: CMV/AC, Resp Rate (Set): 16 breaths/min, Tidal Volume (Set, mL): 425 mL, PEEP (cm H2O): 8 cmH2O, Resp Rate (Set): 16 breaths/min, Tidal Volume (Set, mL): 425 mL, PEEP (cm H2O): 8 cmH2O    Patient transported from PACU to ICU room 363 on mechanical ventilator. Patient tolerated well. RT to follow.     Priscila Donaldson, RT  2:15 PM September 1, 2024

## 2024-09-01 NOTE — PLAN OF CARE
"End of Shift Summary  For vital signs and complete assessments, please see documentation flowsheets.     Pertinent assessments: A&O- restless overnight. VSS except for high BP. LS diminished. BS hypoactive. Not passing gas. NG to LIS. Denies nausea. Sitting up in chair most of night. Reports abdominal discomfort and pain- PRN oxy given. Trace edema to both feet. Blanchable redness on spine- placed pillows behind his back while up in chair and protective mepilex applied. NPO. Heparin gtt and LR stopped at 0340 due to loss of IV access. Up Ax1 with walker.    Major Shift Events: lost IV access at 0300. Flyer attempted x3 with no success. MD notified. Anesthesia notified.     Treatment Plan: lap in AM, I/O, IVF    Bedside Nurse: Guerline Bae RN       Goal Outcome Evaluation:      Plan of Care Reviewed With: patient    Overall Patient Progress: no changeOverall Patient Progress: no change    Outcome Evaluation: BS hypoactive, not passing gas, NG to LIS, abdominal discomfort and pain      Problem: Adult Inpatient Plan of Care  Goal: Plan of Care Review  Description: The Plan of Care Review/Shift note should be completed every shift.  The Outcome Evaluation is a brief statement about your assessment that the patient is improving, declining, or no change.  This information will be displayed automatically on your shift  note.  Outcome: Progressing  Flowsheets (Taken 9/1/2024 0240)  Outcome Evaluation: BS hypoactive, not passing gas, NG to LIS, abdominal discomfort and pain  Plan of Care Reviewed With: patient  Overall Patient Progress: no change  Goal: Patient-Specific Goal (Individualized)  Description: You can add care plan individualizations to a care plan. Examples of Individualization might be:  \"Parent requests to be called daily at 9am for status\", \"I have a hard time hearing out of my right ear\", or \"Do not touch me to wake me up as it startles  me\".  Outcome: Progressing  Goal: Absence of Hospital-Acquired " Illness or Injury  Outcome: Progressing  Intervention: Identify and Manage Fall Risk  Recent Flowsheet Documentation  Taken 8/31/2024 1958 by Guerline Bae RN  Safety Promotion/Fall Prevention:   assistive device/personal items within reach   clutter free environment maintained   mobility aid in reach   nonskid shoes/slippers when out of bed   patient and family education   room near nurse's station   room organization consistent   safety round/check completed   supervised activity  Intervention: Prevent Skin Injury  Recent Flowsheet Documentation  Taken 8/31/2024 1958 by Guerline Bae RN  Body Position: position changed independently  Intervention: Prevent and Manage VTE (Venous Thromboembolism) Risk  Recent Flowsheet Documentation  Taken 8/31/2024 1958 by Guerline Bae RN  VTE Prevention/Management: SCDs off (sequential compression devices)  Intervention: Prevent Infection  Recent Flowsheet Documentation  Taken 8/31/2024 1958 by Guerline Bae RN  Infection Prevention: rest/sleep promoted  Goal: Optimal Comfort and Wellbeing  Outcome: Progressing  Goal: Readiness for Transition of Care  Outcome: Progressing     Problem: Pain Acute  Goal: Optimal Pain Control and Function  Outcome: Progressing  Intervention: Prevent or Manage Pain  Recent Flowsheet Documentation  Taken 8/31/2024 1958 by Guerline Bae RN  Medication Review/Management: medications reviewed     Problem: Comorbidity Management  Goal: Maintenance of Heart Failure Symptom Control  Outcome: Progressing  Intervention: Maintain Heart Failure Management  Recent Flowsheet Documentation  Taken 8/31/2024 1958 by Guerline Bae RN  Medication Review/Management: medications reviewed     Problem: Constipation  Goal: Effective Bowel Elimination  Outcome: Progressing

## 2024-09-01 NOTE — OP NOTE
General Surgery Operative Note      Pre-operative diagnosis: Small bowel obstruction   Post-operative diagnosis: Small bowel obstruction due dense adhesions   Procedure: Diagnostic laparoscopy converted to laparotomy, extensive lysis of adhesions   Surgeon: Anastacia Manzanares MD   Assistant(s): Jung Farias PA-C  The Physician Assistant was medically necessary for their expertise in prepping, camera management, suctioning, suturing and retraction.   Anesthesia: General    Estimated blood loss:  Complications: 150 ml  None   Specimens: None     INDICATION FOR PROCEDURE: This is a 89 year old male who presented with abdominal pain of 2 day's duration. CT scan of the abdomen was consistent with small bowel obstruction without a clear transition point.  He has a history of a partial colectomy ~ 35 years ago. He has had a trial of conservative management with gastrografin challenge and has not had any significant antegrade bowel function.  We discussed operative management of small bowel obstruction including risks and benefits, and the patient agreed to proceed.    DESCRIPTION OF PROCEDURE:  The patient was placed on the table in supine position.  A Palm catheter was placed. General endotracheal anesthesia was induced and the abdomen was prepped and draped in standard sterile fashion.  A vertically oriented 12 mm incision was made superior to the umbilicus and the fascia was sharply incised. A William port was placed and insufflation was applied. We placed a laparoscope through the trocar, but it was immediately evident that there were numerous adhesions and no space to work.  Therefore, we extended our incision superiorly and inferiorly and converted to a laparotomy.  He had a long transverse incision in the left lower quadrant extending past midline from his previous colectomy.  On the underside this incision he had small bowel densely adherent to the underside of his abdominal wall.  These adhesions were thick and  mildly vascular. They were very carefully taken down with Metzenbaum scissors, occasionally leaving a layer of peritoneum on the bowel wall as needed.  Notably, the patient's bowel loops were massively dilated.  There was no clear transition point in these anterior abdominal wall adhesions and all of the bowel loops that were  from anterior abdominal wall were dilated.     Once all the adhesions to the anterior abdominal wall were taken down, which took approximately 1 hour, a large Brayden wound retractor was placed. We began the process of eviscerating the bowel to allow for complete lysis of adhesions.  An additional hour of lysis adhesions was undertaken to separate several thick adhesive bands between loops of small bowel as well as between small bowel and sigmoid colon.  As we are working, we encountered 2 areas where the bowel had become so distended that there seem to be some splitting of serosa.  These areas were oversewn with interrupted 3-0 Vicryl seromuscular sutures.  Additionally, there were 3 serosal tears that were oversewn in a similar fashion.  The serosal tears were inherent to the nature of the procedure.      Once all of the small bowel was free of adhesions and could be run from the ligament of Treitz to the ileocecal valve, we began milking intraluminal contents retrograde to the stomach to be evacuated via the NG tube.  A total of 4 L of intraluminal contents were milked back this way.  Additionally, he had some succus draining out of his mouth whenever the NG tube clogged.  When we finished lysing adhesions, there was a fair amount of gas and liquid stool that had progressed into the colon, signifying that the obstruction seemed to be resolving.     Hemostasis was assured.  A sheet of Surgicel was placed over a area on the left colon which had a very slow ooze.  The NG tube was confirmed to be in a good position within the stomach by palpation, though it was still clogging frequently  and required irrigation.  The midline fascia was closed with 0-looped PDS and the skin was closed with staples.  Sterile dressing was applied.  A rectal exam was performed and there was copious watery stool evacuated from the rectum. Hard balls of stool could be palpated as well, but could not be evacuated. The patient tolerated the procedure fairly well, but was still requiring pressors at the conclusion of the operation.  Therefore he will be admitted to the ICU. Sponge and instrument counts were correct.     FINDINGS: Small bowel obstruction caused by dense adhesions throughout the abdomen, total 2 hours of lysis of adhesions performed.  4 L of malodorous intraluminal contents evacuated through the NG tube throughout the operation.     Anastacia Manzanares MD

## 2024-09-01 NOTE — PROGRESS NOTES
Intensivist brief update  DOS: 9/1/2024    Labs noted:27/44/85/20  Cr up at 1.85, gap 14  Lactic 4.7  WBCs 3    # Shock, unclassified (likely distributive, hypovolemic)  # Metabolic acidosis  # Lactic acidosis  # Hypoxemic respiratory failure  # PUSHPA  - Additional volume bolus  - Lactate at 2000    Salvador Moran MD, PhD  Surgical critical care  September 1, 2024, 4:50 PM

## 2024-09-01 NOTE — ANESTHESIA PROCEDURE NOTES
Airway       Patient location during procedure: OR       Procedure Start/Stop Times: 9/1/2024 10:06 AM  Staff -        Anesthesiologist:  Philippe Begum DO       CRNA: Ching Pike APRN CRNA       Performed By: CRNA  Consent for Airway        Urgency: elective  Indications and Patient Condition       Indications for airway management: jose a-procedural       Induction type:intravenous       Mask difficulty assessment: 2 - vent by mask + OA or adjuvant +/- NMBA    Final Airway Details       Final airway type: endotracheal airway       Successful airway: ETT - single  Endotracheal Airway Details        ETT size (mm): 8.0       Cuffed: yes       Cuff volume (mL): 8       Successful intubation technique: video laryngoscopy       VL Blade Size: Glidescope 3       Grade View of Cords: 1       Adjucts: stylet       Position: Right       Measured from: gums/teeth       Secured at (cm): 25       Bite block used: None    Post intubation assessment        Placement verified by: capnometry, equal breath sounds and chest rise        Number of attempts at approach: 1       Number of other approaches attempted: 0       Secured with: commercial tube florian       Ease of procedure: easy       Dentition: Intact and Unchanged    Medication(s) Administered   Medication Administration Time: 9/1/2024 10:06 AM

## 2024-09-01 NOTE — PROGRESS NOTES
[facility]    Medicine Progress Note - Hospitalist Service    Date of Admission:  8/29/2024    Assessment & Plan     89-year-old male with history of mechanical AVR and MVR, dual-chamber pacemaker due to heart block, colon cancer status post hemicolectomy who presented with nausea and dry heaves and was found to have distal small bowel obstruction on CT scan.      Recurrent small bowel obstruction.  Secondary to adhesions in patient with history of hemicolectomy 1990.  Failed Gastrografin challenge.  Underwent laparotomy with extensive lysis of adhesions on 9/1.  Was hypotensive during the surgery and was given crystalloid and albumin bolus and transferred to ICU on ventilator and vasopressor support.    Status post mechanical AVR and MVR.  PFO closure and PPM for complete heart block.  Reverse Coumadin with vitamin K 5 mg IV x 1.  Was getting Lovenox, was switched to heparin drip which was ordered for surgery.  To hold off on heparin drip today as per general surgery.    Severe hypovolemic and distributive shock postoperatively.  Respiratory failure.  Left intubated postoperatively due to hemodynamic instability.  Ventilator management per crit care.  PUSHPA.  Likely prerenal with risk of developing ATN.  Continue LR at 100 mL/h  Goals of care.  I had detailed discussion regarding patient's goals of care with patient family including his daughter Guillermina and son.  They all agreed that he would not want CPR in case he has cardiac arrest.  CODE STATUS changed to no CPR, okay for prearrest intubation.  Chronic medical conditions  Hyperlipidemia: Can resume Lipitor on discharge.  History of compression fractures: Severe compression fracture deformity of T12 and mild compression superior endplate L2 and moderate L4 fracture.  No recent falls.          Diet:      DVT Prophylaxis: SCDs.    Palm Catheter: PRESENT, indication:    Lines: PRESENT      CVC Triple Lumen Right Internal jugular-Site  Assessment: WDL    Cardiac Monitoring: None  Code Status: No CPR- Pre-arrest intubation OK      Clinically Significant Risk Factors               # Coagulation Defect: INR = 1.42 (Ref range: 0.85 - 1.15) and/or PTT = N/A, will monitor for bleeding   # Acute Kidney Injury, unspecified: based on a >150% or 0.3 mg/dL increase in last creatinine compared to past 90 day average, will monitor renal function                 # Pacemaker present         Disposition Plan     Medically Ready for Discharge: Anticipated in 2-4 Days        Eric Rocha MD  Hospitalist Service  [facility]  Securely message with Vocera (more info)  Text page via [employer] Paging/Directory   ______________________________________________________________________    Interval History     Intubated.  Physical Exam   Vital Signs: Temp: 97  F (36.1  C) Temp src: Temporal BP: 91/63 Pulse: 117   Resp: 12 SpO2: 93 % O2 Device: Mechanical Ventilator    Weight: 138 lbs 0 oz    General Appearance: Intubated and sedated.  Frail elderly male in no acute distress.  Has NG tube  Respiratory: Clear to auscultation  Cardiovascular: S1-S2 normal  GI: Laparotomy incision dressed.  Has Palm.  Skin: No rash  Other: No edema      Medical Decision Making       MANAGEMENT DISCUSSED with the following over the past 24 hours:     , RN, anesthesiologist, intensivist and patient's family.    Data     I have personally reviewed the following data over the past 24 hrs:    7.9  \   14.6   / 270     142 103 58.1 (H) /  114 (H)   3.9 23 1.41 (H) \     INR:  1.42 (H) PTT:  N/A   D-dimer:  N/A Fibrinogen:  N/A       Imaging results reviewed over the past 24 hrs:   Recent Results (from the past 24 hour(s))   XR Gastrografin Challenge    Narrative    EXAM: XR GASTROGRAFIN CHALLENGE  LOCATION: [facility]  DATE: 8/31/2024    INDICATION: Small Bowel Obstruction  COMPARISON: Same date abdominal radiograph  TECHNIQUE: Routine water soluble contrast  follow-through challenge.      Impression    IMPRESSION:  1.  Water soluble contrast material is NOT identified in the colon 8 hours post administration.  2.  Persistent distention of multiple loops of small bowel, compatible with known mechanical obstruction. Of note, minimal oral contrast has left the stomach. Nasogastric tube again noted, distal tip obscured by contrast.   3.  Stable postsurgical changes in the heart. Bones are unchanged.   XR Abdomen 2 Views    Narrative    EXAM: XR ABDOMEN 2 VIEWS  LOCATION: [facility]  DATE: 09/01/2024    INDICATION: Small bowel obstruction.  COMPARISON: 08/31/2024.      Impression    IMPRESSION: Numerous dilated small bowel loops throughout the abdomen with air-fluid levels, similar to the previous exam, consistent with small bowel obstruction. Oral contrast is noted in the stomach and within multiple loops of dilated small bowel.   Enteric tube, tip in the gastric body. Sternotomy and aortic valve prostheses.     XR Chest Port 1 View    Narrative    EXAM: XR CHEST PORT 1 VIEW  LOCATION: [facility]  DATE: 9/1/2024    INDICATION: Central line placement  COMPARISON: 10/27/2023      Impression    IMPRESSION: Interval placement of endotracheal tube with tip located 3.5 cm above the page. Right IJ central line tip in mid SVC. Orogastric tube tip and side port within the gastric lumen. Stable left subclavian dual-chamber pacemaker. Sternotomy with   aortic and mitral valve prosthesis redemonstrated. Heart size and vascularity are normal. Calcified granuloma right upper lobe. Shallow ventilation accentuates bibasilar subsegmental atelectasis. No pneumothorax nor pleural effusion.

## 2024-09-01 NOTE — ANESTHESIA POSTPROCEDURE EVALUATION
Patient: Salvador Costello    Procedure: Procedure(s):  DIAGNOSTIC LAPAROSCOPY, LAPAROTOMY, EXTENSIVE LYSIS OF ADHESIONS       Anesthesia Type:  General    Note:  Disposition: ICU            ICU Sign Out: Anesthesiologist/ICU physician sign out WAS performed   Postop Pain Control:    PONV: No   Neuro/Psych:    Airway/Respiratory:             Sign Out: AIRWAY IN SITU/Resp. Support               Airway in situ/Resp. Support: ETT                 Reason: Planned Pre-op   CV/Hemodynamics:             Sign Out: Detailed CV status               Blood Pressure: Pressors               Rate/Rhythm: PM dependent               Perfusion:  Adequate perfusion indices   Other NRE:    DID A NON-ROUTINE EVENT OCCUR?     Event details/Postop Comments:  Patient to ICU, report to Dr. Contreras           Last vitals:  Vitals Value Taken Time   /70 09/01/24 1420   Temp     Pulse 116 09/01/24 1423   Resp 14 09/01/24 1423   SpO2 96 % 09/01/24 1423   Vitals shown include unfiled device data.    Electronically Signed By: Philippe Begum DO  September 1, 2024  2:24 PM

## 2024-09-01 NOTE — PROGRESS NOTES
ICU staff  DOS 9/1/2024    Asked to see this patient for ventilator management.    Mr Costello is an 88 y/o man with a history of mechanical AVR/MVR, heart block with PPM, and colon cancer who presented 8/29 with small bowel obstruction. This was his second SBO per the charts; he underwent laparotomy and partial colectomy in 1990. He failed conservative management and was taken to the OR today for laparotomy. Per discussion with Dr Manzanares, he had adhesive disease but no bowel resection was required. A total of 4L fluid was expressed back up through the NG, which was confirmed to be in the stomach by palpation. He required some pressors during the case, and the anesthesia/operative teams felt it was safest to leave him intubated postoperatively.     Medical history for this patient includes colon cancer s/p colectomy 1990, heart block s/p PPM, mechanical aortic/mitral valve replacements in 2007, and dyslipidemia. Home meds include atorvastatin and warfarin.     Vitals:   Temp:  [97  F (36.1  C)-98.3  F (36.8  C)] 97  F (36.1  C)  Pulse:  [] 117  Resp:  [16-18] 16  BP: (127-149)/(79-98) 147/88  SpO2:  [92 %-96 %] 92 %     Resp: 16  I/O last 3 completed shifts:  In: 900 [I.V.:900]  Out: 1150 [Urine:900; Emesis/NG output:250]    NE 0.06    Exam:  Gen: Intubated, sedated  HEENT: NC/AT, anicteric, emesis when turned despite functioning NGT  Pulm: Mechanically ventilated, equal chest rise  Cor: RRR, PPM in right upper chest wall  Abdomen/GI: Soft, nondistended, dressing CDI  : Palm in place  Extremities: Cool, nonedematous  Skin: Cool, some mottling on knees  Neuro: Sedated  Psych: Unable to assess    Data:   Labs reviewed  - This AM, Cr up to 1.41 from 0.76  Imaging reviewed  - CXR with some atelectasis, ETT and RIJ line look OK    Assessment/plan:  88 y/o man with mechanical aortic and mitral valve prostheses admitted to the hospital 8/29 with small bowel obstruction. Failed conservative management. Now presents  to the ICU following exploratory laparotomy and enterolysis. He remains intubated.    CNS: Intubated, sedated  # Postoperative pain  # Sedation  - Propofol to RASS 0/-1  - PRN hydromorphone    Pulm: Kept intubated because of concerns for aspiration risk, per report.  # Acute hypoxemic respiratory failure  - Mechanical ventilation tonight    CV: On low-dose norepinephrine.  # Hypotension, likely distributive  # Mechanical aortic and mitral valve prostheses  # Heart block s/p permanent dual-chamber pacemaker  - Likely fluid-down a bit, resuscitate as needed  - Give 500 mL LR now as he is tachycardic and on pressors  - Norepinephrine for MAP goal >65  - Will need heparin gtt resumed as soon as OK per surgery, to bridge back to warfarin    GI: Abdomen soft, dressings intact  # Small bowel obstruction s/p laparotomy, enterolysis  # Nutrition  - NPO, NG decompression  - Postoperative cares per surgery    : Postoperative metabolic panel pending.  # PUSHPA  - Presumably pre-renal; fluid support, trend labs and UOP    Heme: Hb 14.6 preoperatively.   # Drug-induced coagulation defect  - Per discussion with Dr Manzanares, hold heparin for tonight (significant raw surface oozing during the case)    Msk: Extremities cool, nonedematous.    Endo: Glucose 175 this AM. Routine glycemic monitoring.    ID: Afebrile, no ongoing indication for antibiotic.    ICU: SCDs, PPI.    This patient is critically ill to my assessment and requires ICU monitoring and cares. A total of 45 minutes critical care time spent on 9/1/2024, exclusive of procedures.     Salvador Moran MD, PhD  Surgical critical care  9/1/2024, 3:10 PM    Clinically Significant Risk Factors               # Coagulation Defect: INR = 1.42 (Ref range: 0.85 - 1.15) and/or PTT = N/A, will monitor for bleeding   # Acute Kidney Injury, unspecified: based on a >150% or 0.3 mg/dL increase in last creatinine compared to past 90 day average, will monitor renal function                 #  Pacemaker present

## 2024-09-01 NOTE — PLAN OF CARE
"  Problem: Adult Inpatient Plan of Care  Goal: Plan of Care Review  Description: The Plan of Care Review/Shift note should be completed every shift.  The Outcome Evaluation is a brief statement about your assessment that the patient is improving, declining, or no change.  This information will be displayed automatically on your shift  note.  Outcome: Progressing  Flowsheets (Taken 8/31/2024 1940)  Outcome Evaluation: denies pain  Plan of Care Reviewed With: patient  Overall Patient Progress: no change  Goal: Patient-Specific Goal (Individualized)  Description: You can add care plan individualizations to a care plan. Examples of Individualization might be:  \"Parent requests to be called daily at 9am for status\", \"I have a hard time hearing out of my right ear\", or \"Do not touch me to wake me up as it startles  me\".  Outcome: Progressing  Goal: Absence of Hospital-Acquired Illness or Injury  Outcome: Progressing  Intervention: Identify and Manage Fall Risk  Recent Flowsheet Documentation  Taken 8/31/2024 1300 by Gifty De Jesus RN  Safety Promotion/Fall Prevention: safety round/check completed  Goal: Optimal Comfort and Wellbeing  Outcome: Progressing  Intervention: Monitor Pain and Promote Comfort  Recent Flowsheet Documentation  Taken 8/31/2024 1935 by Gifty De Jesus RN  Pain Management Interventions: medication (see MAR)  Goal: Readiness for Transition of Care  Outcome: Progressing   End of Shift Summary  For vital signs and complete assessments, please see documentation flowsheets.     Pertinent assessments: A&o--  up in room---  Gastrographin completed ---  NG  retaped multple times ---  medicated for pain x1 ----Heparin started at 1900  to end at 6am  Major Shift Events   test  Treatment Plan: monitor  Bedside Nurse: Gifty De Jesus RN       Problem: Pain Acute  Goal: Optimal Pain Control and Function  Outcome: Progressing  Intervention: Develop Pain Management Plan  Recent Flowsheet Documentation  Taken 8/31/2024 " 1935 by Gifty De Jesus, RN  Pain Management Interventions: medication (see MAR)     Problem: Comorbidity Management  Goal: Maintenance of Heart Failure Symptom Control  Outcome: Progressing     Problem: Constipation  Goal: Effective Bowel Elimination  Outcome: Progressing   Goal Outcome Evaluation:

## 2024-09-01 NOTE — ANESTHESIA PREPROCEDURE EVALUATION
Anesthesia Pre-Procedure Evaluation    Patient: Salvador Costello   MRN: [id number] : 1935        Procedure : Procedure(s):  DIAGNOSTIC LAPAROSCOPY, POSSIBLE LAPAROTOMY, POSSIBLE BOWEL RESECTION          Past Medical History:   Diagnosis Date    Colon cancer     S/p hemicolectomy    Complete heart block (H) 10/2023    S/p PPM    H/O mechanical aortic valve replacement     H/O mitral valve replacement with mechanical valve     Hyperlipidemia     Osteopenia       Past Surgical History:   Procedure Laterality Date    Aortic valve replacement NOS      Colon cancer resection      COLONOSCOPY N/A 10/21/2014    Procedure: COLONOSCOPY;  Surgeon: Brett Reece MD;  Location:  GI    EP PACEMAKER DEVICE & LEAD IMPLANT- RIGHT ATRIAL & RIGHT VENTRICULAR N/A 10/27/2023    Procedure: Pacemaker Device & Lead Implant - Right Atrial & Right Ventricular;  Surgeon: Santosh Sanchez MD;  Location:  HEART CARDI[facility] CATH LAB    HERNIA REPAIR      Mitral valve replacement NOS        No Known Allergies   Social History     Tobacco Use    Smoking status: Former    Smokeless tobacco: Not on file   Substance Use Topics    Alcohol use: Yes     Alcohol/week: 2.5 standard drinks of alcohol     Types: 3 Cans of beer per week     Comment: a beer a day      Wt Readings from Last 1 Encounters:   24 62.6 kg (138 lb)        Anesthesia Evaluation            ROS/MED HX  ENT/Pulmonary:     (+)                tobacco use, Past use,                       Neurologic:  - neg neurologic ROS     Cardiovascular:     (+) Dyslipidemia - -   -  - -              pacemaker, Reason placed: Heart block.            valvular problems/murmurs  AVR, MVR.         METS/Exercise Tolerance:     Hematologic:  - neg hematologic  ROS     Musculoskeletal:  - neg musculoskeletal ROS     GI/Hepatic: Comment: Small bowel obstruction - neg GI/hepatic ROS     Renal/Genitourinary:  - neg Renal ROS     Endo:  - neg endo ROS     Psychiatric/Substance Use:  -  "neg psychiatric ROS     Infectious Disease:  - neg infectious disease ROS     Malignancy:   (+) Malignancy, History of GI.    Other:  - neg other ROS          Physical Exam    Airway        Mallampati: II    Neck ROM: full     Respiratory Devices and Support         Dental           Cardiovascular   cardiovascular exam normal       Rhythm and rate: regular     Pulmonary           (+) decreased breath sounds           OUTSIDE LABS:  CBC:   Lab Results   Component Value Date    WBC 7.9 09/01/2024    WBC 7.1 08/31/2024    HGB 14.6 09/01/2024    HGB 14.0 08/31/2024    HCT 44.5 09/01/2024    HCT 42.8 08/31/2024     09/01/2024     08/31/2024     BMP:   Lab Results   Component Value Date     09/01/2024     08/31/2024    POTASSIUM 3.9 09/01/2024    POTASSIUM 4.2 08/31/2024    CHLORIDE 103 09/01/2024    CHLORIDE 103 08/31/2024    CO2 23 09/01/2024    CO2 26 08/31/2024    BUN 58.1 (H) 09/01/2024    BUN 38.2 (H) 08/31/2024    CR 1.41 (H) 09/01/2024    CR 0.76 08/31/2024     (H) 09/01/2024     (H) 08/31/2024     COAGS:   Lab Results   Component Value Date    INR 1.42 (H) 09/01/2024     POC: No results found for: \"BGM\", \"HCG\", \"HCGS\"  HEPATIC:   Lab Results   Component Value Date    ALBUMIN 4.4 08/29/2024    PROTTOTAL 7.0 08/29/2024    ALT 23 08/29/2024    AST 41 08/29/2024    ALKPHOS 71 08/29/2024    BILITOTAL 1.1 08/29/2024     OTHER:   Lab Results   Component Value Date    LACT 1.9 08/29/2024    AUDELIA 8.8 09/01/2024    MAG 2.3 08/31/2024    LIPASE 29 08/29/2024       Anesthesia Plan    ASA Status:  3       Anesthesia Type: General.     - Airway: ETT   Induction: Intravenous, Propofol.   Maintenance: Balanced.        Consents    Anesthesia Plan(s) and associated risks, benefits, and realistic alternatives discussed. Questions answered and patient/representative(s) expressed understanding.     - Discussed:     - Discussed with:  Patient            Postoperative Care    Pain management: IV " analgesics, Oral pain medications, Multi-modal analgesia.   PONV prophylaxis: Ondansetron (or other 5HT-3), Dexamethasone or Solumedrol     Comments:               Philippe Begum, DO    I have reviewed the pertinent notes and labs in the chart from the past 30 days and (re)examined the patient.  Any updates or changes from those notes are reflected in this note.

## 2024-09-01 NOTE — ANESTHESIA CARE TRANSFER NOTE
Patient: Salvador Costello    Procedure: Procedure(s):  DIAGNOSTIC LAPAROSCOPY, LAPAROTOMY, EXTENSIVE LYSIS OF ADHESIONS       Diagnosis: SBO (small bowel obstruction) (H) [K56.609]  Diagnosis Additional Information: No value filed.    Anesthesia Type:   General     Note:    Oropharynx: endotracheal tube in place and ventilatory support  Level of Consciousness: iatrogenic sedation      Independent Airway: airway patency satisfactory and stable  Dentition: dentition unchanged  Vital Signs Stable: post-procedure vital signs reviewed and stable  Report to RN Given: handoff report given  Patient transferred to: PACU    Handoff Report: Identifed the Patient, Identified the Reponsible Provider, Reviewed the pertinent medical history, Discussed the surgical course, Reviewed Intra-OP anesthesia mangement and issues during anesthesia, Set expectations for post-procedure period and Allowed opportunity for questions and acknowledgement of understanding  Vitals:  Vitals Value Taken Time   BP 90/62 09/01/24 1415   Temp     Pulse 114 09/01/24 1419   Resp 12 09/01/24 1419   SpO2 96 % 09/01/24 1419   Vitals shown include unfiled device data.    Electronically Signed By: ERLINDA Joseph CRNA  September 1, 2024  2:20 PM

## 2024-09-02 NOTE — PLAN OF CARE
ICU End of Shift Summary.  For vital signs and complete assessments, please see documentation flowsheets.      Pertinent assessments:   Neuro: Rass goal 0 to -1, started on propofol for sedation.  Pt decreased temp upon arrival, placed rectal temp probe, temp read 35.3  C. Able to warm pt up to 36 C by end of shift.   Cardiac: Tachycardic with PVCs -130. BP hypotensive, map goal >65. Con't with norepi- max this shift 0.32  Resp: LS clear, intubated on ETT of 8- FiO2 100%.  GI: BS hypoactive. Surgical dressing on midline of abdomen. Abd soft around dressing. Liquid stool, small amount out of rectum when turning patient.   : Cannon intact, small amount of yellow urine. MD notified of decreased urine amount.   Skin: Scattered bruising on pt, scattered scabs on legs. Small red/purple area on pt back R side. LDA placed. Mepilexes placed on pts sacrum and bony prominences on back  Lines: R internal jugular, 1 PIV  Drips: Norepi, Prop, LR     Major Shift Events:   Admitted to ICU  Increasing of norepi d/t decrease in BP.     Plan (Upcoming Events): Con't with BP support of vasopressors,   Discharge/Transfer Needs: TBD     Bedside Shift Report Completed : Yes  Bedside Safety Check Completed: Yes     Goal Outcome Evaluation:      Plan of Care Reviewed With: patient    Overall Patient Progress: decliningOverall Patient Progress: declining    Outcome Evaluation: Admitted to ICU for hypotension, on norepi, increased throughout shift. LR bolus given, NS bolus started at end of shift. Pt remained intubated from PACU.    Notified provider about indwelling cannon catheter discussed removal or continued need.    Did provider choose to remove indwelling cannon catheter? NO    Provider's cannon indication for keeping indwelling cannon catheter: Indication for continued use: Strict 1-2 Hour I & O if external catheters are not an option    Is there an order for indwelling cannon catheter? YES    *If there is a plan to keep cannon  catheter in place at discharge daily notification with provider is not necessary, but please add a notation in the treatment team sticky note that the patient will be discharging with the catheter.      Right wrist and Left wrist restraints continued 9/1/2024    Clinical Justification: Pulling lines, pulling tubes, and pulling equipment  Less Restrictive Alternative: Repositioning, Re-evaluate equipment, Disguise equipment, Pain management, De-escalation, Reorientation  Attending Provider Notified: Yes, Attending Provider's Name: Chandler   New orders placed Yes  Length of Order: 1 Day      Ailyn Hollins RN

## 2024-09-02 NOTE — PROGRESS NOTES
I examined patient using tele ICU.  -I am in [facility] Icu and examined patient  who is in [facility] Icu using tele ICU.     -Patient is hypotensive. Appears dry on examination.  - Lactic acidosis is noted and increased need for pressers is noted.  -I will give more fluids to the patient.  - Add stress dose steroids.  - Check labs frequently  - Switch from propofol to versed.  -Replace lytes as needed.   2gm calcium is ordered.   -an amp of bicarb was also given.     Discussed with bedside nurse.     [facility]  CRITICAL CARE STAFF NOTE    I am managing these acute and ongoing critical issues resulting in critical condition that impairs one or more vital organ systems, incur a high probability of imminent or life-threatening deterioration in the patient's condition and providing frequent personal assessment and manipulation of medications and life support equipment.     1. Septic shock   2. Hypovolemic shock     ICU Interventions: ventilator management, parenteral medications necessitating continuous monitoring including vasoactive medications, medication for heart rhythm control, insulin infusion, and/or sedative/opiates , and interpretation of lab values, cardiac output, cxr, pulse oximetry, blood gases, and/or information/data stored in computers    The patient was seen and examined by me.  We have discussed the patient in detail and I agree with the findings, assessment, and plan as documented when this note was cosigned on this day. The plan was formulated in conjunction with pharmacy, ICU nurses, and respiratory therapist. I have evaluated all laboratory values and imaging studies for the past 24 hours. I have reviewed all the consults that have been ordered and are active for this patient.      Critical Care Time: 75 min.  I spent this time (excluding procedures) personally providing and directing critical care services at the bedside and on the critical care unit.      Roosevelt BINGHAM MPH  Assistant  Professor of Medicine  Pager: [phone]    Clinically Significant Risk Factors               # Coagulation Defect: INR = 1.42 (Ref range: 0.85 - 1.15) and/or PTT = N/A, will monitor for bleeding   # Acute Kidney Injury, unspecified: based on a >150% or 0.3 mg/dL increase in last creatinine compared to past 90 day average, will monitor renal function                 # Pacemaker present            Code Status: No CPR- Pre-arrest intubation OK

## 2024-09-02 NOTE — CODE/RAPID RESPONSE
-At 6:10 AM patient had worsening of blood pressure and I examined patient using tele ICU. I am in [facility] and patient is in [facility] ICU.    -Worsening blood pressure was noted. An amp of bicarb was pushed as well as an increase in norepi was noted.     -An mo of epi was also given.   - V tech was noted and his examination reveleaed no carotid pulse after initial feeble pulse.     -decision was made to not do CPR as per patients wish.     - Further resorative care was stopped at 6:20. Daughter is called and informed.

## 2024-09-02 NOTE — PROGRESS NOTES
Salvador Costello was admitted on 8/29/2024 for SBO s/p laparotomy.     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     Oxygen Therapy: VENT DAY 2: 16/420/+8/30%    SBT: not complete due to recent intubation, pressor usage.     Respiratory Medications: albuterol Q2PRN    Breath Sounds and Secretions: Clear, diminished with minimal secretions    VBG @ 9/1 2000: 7.30/46/41/23    Shift Note: Patient remained intubated throughout the shift with no acute respiratory concerns throughout the night.

## 2024-09-02 NOTE — PROGRESS NOTES
Patient was placed in restraints during direct anesthesia recovery.  See flowsheet for details about provider(s) involved, patient and family education, discontinuation criteria and restraint type.

## 2024-09-02 NOTE — PLAN OF CARE
Goal Outcome Evaluation:      Plan of Care Reviewed With: patient    Overall Patient Progress: improvingOverall Patient Progress: improving    Outcome Evaluation: .    ICU End of Shift Summary.  For vital signs and complete assessments, please see documentation flowsheets.     Pertinent assessments: VSS on vent. LS dim. RASS goal 0/-1. [place]. Abdominal incisions post surgery . Preventative and blanchable redness mepi to sacrum and left mid back. Tele: ST, BBB, Prolong Qtc. Permanent pacemaker. Doppler pulses. Hypoactive BS, multiple loose stools. Rectal tube placed. OG @ 70 @ lips, LIS. Palm with very minimal output. R internal jugular, and PIV. K, and mag protocols. LR running @ 100 ml/hr. Rectal thermometer probe.   Major Shift Events:   - Vaso initiated at beginning of shift,  fixed rate.   - Levo titrated as appropriate per MAP evaluation.  - 25 g Albumin given.  - 1 Amp sodium bicarb given.   - 2 g calcium gluconate given.   - Started stress-dose steroids.   - Transitioned from propofol to versed gtt. Shut off prop per Tele hub instruction.    - Noticed rhythmic movements of the neck and head. (See Note) Versed boluses given per intensivist.   - Placed rectal tube for watery BMs throughout shift.   - Fever overnight, T max 102.4. MD notified, added orders for blood cultures, pending results.   - Movement of OG tube, per tele hub advanced. Repeat X-ray obtained.   - AM blood sugar 66. Switched LR to D10    - 0550 patient's MAP dropped below 65. Levo titrated as appropriate to try to achieve MAP goal. Tele hub camera in. Per their orders, an amp of bicarb, one round epi, pulseless, maxed levo 0.6 per intensivist. Intensivist called daughter and spoke, ultimately patient DNR an .     Plan (Upcoming Events):TBD  Discharge/Transfer Needs: TBD    Bedside Shift Report Completed : Yes  Bedside Safety Check Completed: Yes     Right wrist and Left wrist restraints continued 2024    Clinical Justification:  "Pulling lines, pulling tubes, and pulling equipment  Less Restrictive Alternative: Repositioning, Re-evaluate equipment, Disguise equipment, Alarm, De-escalation, Reorientation  Attending Provider Notified: Yes, Attending Provider's Name: Chandler   New orders placed Yes  Length of Order: 1 Day      Roberto Causey RN       Problem: Adult Inpatient Plan of Care  Goal: Plan of Care Review  Description: The Plan of Care Review/Shift note should be completed every shift.  The Outcome Evaluation is a brief statement about your assessment that the patient is improving, declining, or no change.  This information will be displayed automatically on your shift  note.  Outcome: Progressing  Flowsheets (Taken 9/2/2024 0306)  Outcome Evaluation: .  Plan of Care Reviewed With: patient  Overall Patient Progress: improving  Goal: Patient-Specific Goal (Individualized)  Description: You can add care plan individualizations to a care plan. Examples of Individualization might be:  \"Parent requests to be called daily at 9am for status\", \"I have a hard time hearing out of my right ear\", or \"Do not touch me to wake me up as it startles  me\".  Outcome: Progressing  Goal: Absence of Hospital-Acquired Illness or Injury  Outcome: Progressing  Intervention: Identify and Manage Fall Risk  Recent Flowsheet Documentation  Taken 9/2/2024 0000 by Roberto Causey, RN  Safety Promotion/Fall Prevention:   activity supervised   assistive device/personal items within reach   clutter free environment maintained   increased rounding and observation   lighting adjusted   increase visualization of patient   mobility aid in reach   nonskid shoes/slippers when out of bed   patient and family education   treat underlying cause   treat reversible contributory factors   supervised activity   safety round/check completed   room organization consistent   room near nurse's station  Taken 9/1/2024 2000 by Roberto Causey, RN  Safety Promotion/Fall Prevention:   activity " supervised   assistive device/personal items within reach   clutter free environment maintained   increased rounding and observation   lighting adjusted   increase visualization of patient   mobility aid in reach   nonskid shoes/slippers when out of bed   patient and family education   treat underlying cause   treat reversible contributory factors   supervised activity   safety round/check completed   room organization consistent   room near nurse's station  Intervention: Prevent Skin Injury  Recent Flowsheet Documentation  Taken 9/2/2024 0200 by Roberto Causey RN  Body Position:   turned   left   heels elevated   log-rolled  Taken 9/2/2024 0019 by Roberto Causey RN  Body Position:   turned   right   side-lying  Taken 9/2/2024 0000 by Roberto Causey RN  Body Position:   turned   supine, legs elevated  Skin Protection:   adhesive use limited   incontinence pads utilized   skin to skin areas padded   skin to device areas padded  Device Skin Pressure Protection:   absorbent pad utilized/changed   adhesive use limited   pressure points protected   positioning supports utilized   skin-to-device areas padded   skin-to-skin areas padded  Taken 9/1/2024 2245 by Roberto Causey RN  Body Position:   right   turned   heels elevated  Taken 9/1/2024 2000 by Roberto Causey RN  Body Position:   turned   supine, legs elevated  Skin Protection:   adhesive use limited   incontinence pads utilized   skin to skin areas padded   skin to device areas padded  Device Skin Pressure Protection:   absorbent pad utilized/changed   adhesive use limited   pressure points protected   positioning supports utilized   skin-to-device areas padded   skin-to-skin areas padded  Intervention: Prevent Infection  Recent Flowsheet Documentation  Taken 9/2/2024 0000 by Roberto Causey RN  Infection Prevention:   environmental surveillance performed   equipment surfaces disinfected   hand hygiene promoted   personal protective equipment utilized   rest/sleep  promoted   single patient room provided  Taken 9/1/2024 2000 by Roberto Causey, RN  Infection Prevention:   environmental surveillance performed   equipment surfaces disinfected   hand hygiene promoted   personal protective equipment utilized   rest/sleep promoted   single patient room provided  Goal: Optimal Comfort and Wellbeing  Outcome: Progressing  Intervention: Monitor Pain and Promote Comfort  Recent Flowsheet Documentation  Taken 9/1/2024 2000 by Roberto Causey, RN  Pain Management Interventions:   medication (see MAR)   relaxation techniques promoted  Intervention: Provide Person-Centered Care  Recent Flowsheet Documentation  Taken 9/2/2024 0000 by Roberto Causey RN  Trust Relationship/Rapport:   care explained   choices provided   emotional support provided   empathic listening provided   questions answered   questions encouraged   reassurance provided   thoughts/feelings acknowledged  Taken 9/1/2024 2000 by Roberto Causey RN  Trust Relationship/Rapport:   care explained   choices provided   emotional support provided   empathic listening provided   questions answered   questions encouraged   reassurance provided   thoughts/feelings acknowledged  Goal: Readiness for Transition of Care  Outcome: Progressing     Problem: Pain Acute  Goal: Optimal Pain Control and Function  Outcome: Progressing  Intervention: Develop Pain Management Plan  Recent Flowsheet Documentation  Taken 9/1/2024 2000 by Roberto Causey RN  Pain Management Interventions:   medication (see MAR)   relaxation techniques promoted  Intervention: Prevent or Manage Pain  Recent Flowsheet Documentation  Taken 9/2/2024 0000 by Roberto Causey RN  Bowel Elimination Promotion: adequate fluid intake promoted  Medication Review/Management: medications reviewed  Taken 9/1/2024 2000 by Roberto Causey RN  Bowel Elimination Promotion: adequate fluid intake promoted  Medication Review/Management: medications reviewed  Intervention: Optimize Psychosocial  Wellbeing  Recent Flowsheet Documentation  Taken 9/2/2024 0000 by Roberto Causey RN  Supportive Measures: positive reinforcement provided  Taken 9/1/2024 2000 by Roberto Causey RN  Supportive Measures: positive reinforcement provided     Problem: Comorbidity Management  Goal: Maintenance of Heart Failure Symptom Control  Outcome: Progressing  Intervention: Maintain Heart Failure Management  Recent Flowsheet Documentation  Taken 9/2/2024 0000 by Roberto Causey RN  Medication Review/Management: medications reviewed  Taken 9/1/2024 2000 by Roberto Causey RN  Medication Review/Management: medications reviewed     Problem: Constipation  Goal: Effective Bowel Elimination  Outcome: Progressing  Intervention: Promote Effective Bowel Elimination  Recent Flowsheet Documentation  Taken 9/2/2024 0000 by Roberto Causey RN  Bowel Elimination Management:   hygiene measures promoted   relaxation techniques promoted  Taken 9/1/2024 2000 by Roberto Causey RN  Bowel Elimination Management:   hygiene measures promoted   relaxation techniques promoted     Problem: Gas Exchange Impaired  Goal: Optimal Gas Exchange  Outcome: Progressing  Intervention: Optimize Oxygenation and Ventilation  Recent Flowsheet Documentation  Taken 9/2/2024 0200 by Roberto Causey RN  Head of Bed (HOB) Positioning: HOB at 30 degrees  Taken 9/2/2024 0019 by Roberto Causey RN  Head of Bed (HOB) Positioning: HOB at 30 degrees  Taken 9/2/2024 0000 by Roberto Causey RN  Airway/Ventilation Management:   airway patency maintained   calming measures promoted   pulmonary hygiene promoted  Head of Bed (HOB) Positioning: HOB at 30 degrees  Taken 9/1/2024 2245 by Roberto Causey RN  Head of Bed (HOB) Positioning: HOB at 30 degrees  Taken 9/1/2024 2000 by Roberto Causey RN  Airway/Ventilation Management:   airway patency maintained   calming measures promoted   pulmonary hygiene promoted  Head of Bed (HOB) Positioning: HOB at 30 degrees     Problem: Infection  Goal: Absence of Infection  Signs and Symptoms  Outcome: Progressing  Intervention: Prevent or Manage Infection  Recent Flowsheet Documentation  Taken 9/2/2024 0000 by Roberto Causey RN  Infection Management: aseptic technique maintained  Taken 9/1/2024 2000 by Roberto Causey RN  Infection Management: aseptic technique maintained     Problem: Intestinal Obstruction  Goal: Optimal Bowel Function  Outcome: Progressing  Intervention: Promote Bowel Function  Recent Flowsheet Documentation  Taken 9/2/2024 0200 by Roberto Causey RN  Body Position:   turned   left   heels elevated   log-rolled  Head of Bed (HOB) Positioning: HOB at 30 degrees  Positioning/Transfer Devices:   pillows   wedge   in use  Taken 9/2/2024 0019 by Roberto Causey RN  Body Position:   turned   right   side-lying  Head of Bed (HOB) Positioning: HOB at 30 degrees  Positioning/Transfer Devices:   pillows   wedge   in use  Taken 9/2/2024 0000 by Roberto Causey RN  Body Position:   turned   supine, legs elevated  Head of Bed (HOB) Positioning: HOB at 30 degrees  Positioning/Transfer Devices:   pillows   in use  Taken 9/1/2024 2245 by Roberto Causey RN  Body Position:   right   turned   heels elevated  Head of Bed (HOB) Positioning: HOB at 30 degrees  Positioning/Transfer Devices:   pillows   in use  Taken 9/1/2024 2000 by Roberto Causey RN  Body Position:   turned   supine, legs elevated  Head of Bed (HOB) Positioning: HOB at 30 degrees  Positioning/Transfer Devices:   pillows   in use  Goal: Fluid and Electrolyte Balance  Outcome: Progressing  Intervention: Monitor and Manage Hypovolemia  Recent Flowsheet Documentation  Taken 9/2/2024 0000 by Roberto Causey RN  Fluid/Electrolyte Management: intravenous fluids adjusted  Taken 9/1/2024 2000 by Roberto Causey RN  Fluid/Electrolyte Management: intravenous fluids adjusted  Goal: Absence of Infection Signs and Symptoms  Outcome: Progressing  Intervention: Prevent or Manage Infection  Recent Flowsheet Documentation  Taken 9/2/2024 0000 by Padilla  MARISOL Mejia  Infection Management: aseptic technique maintained  Taken 9/1/2024 2000 by Roberto Causey RN  Infection Management: aseptic technique maintained  Goal: Optimize Nutrition Status  Outcome: Progressing  Intervention: Promote and Optimize Nutrition Status  Recent Flowsheet Documentation  Taken 9/2/2024 0000 by Roberto Causey RN  Nutrition Support Management: weight trending reviewed  Taken 9/1/2024 2000 by Roberto Causey RN  Nutrition Support Management: weight trending reviewed  Goal: Optimal Pain Control and Function  Outcome: Progressing  Intervention: Prevent or Manage Pain  Recent Flowsheet Documentation  Taken 9/1/2024 2000 by Roberto Causey RN  Pain Management Interventions:   medication (see MAR)   relaxation techniques promoted     Problem: Cardiac Output Decreased  Goal: Effective Cardiac Output  Outcome: Progressing  Intervention: Optimize Cardiac Output  Recent Flowsheet Documentation  Taken 9/2/2024 0200 by Roberto Causey RN  Head of Bed (HOB) Positioning: HOB at 30 degrees  Taken 9/2/2024 0019 by Roberto Causey RN  Head of Bed (HOB) Positioning: HOB at 30 degrees  Taken 9/2/2024 0000 by Roberto Causey RN  Head of Bed (HOB) Positioning: HOB at 30 degrees  Environmental Support: calm environment promoted  Taken 9/1/2024 2245 by Roberto Causey RN  Head of Bed (HOB) Positioning: HOB at 30 degrees  Taken 9/1/2024 2000 by Roberto Causey RN  Head of Bed (HOB) Positioning: HOB at 30 degrees  Environmental Support: calm environment promoted     Problem: Mechanical Ventilation Invasive  Goal: Effective Communication  Outcome: Progressing  Intervention: Ensure Effective Communication  Recent Flowsheet Documentation  Taken 9/2/2024 0000 by Roberto Causey RN  Trust Relationship/Rapport:   care explained   choices provided   emotional support provided   empathic listening provided   questions answered   questions encouraged   reassurance provided   thoughts/feelings acknowledged  Taken 9/1/2024 2000 by Roberto Causey  RN  Trust Relationship/Rapport:   care explained   choices provided   emotional support provided   empathic listening provided   questions answered   questions encouraged   reassurance provided   thoughts/feelings acknowledged  Goal: Optimal Device Function  Outcome: Progressing  Intervention: Optimize Device Care and Function  Recent Flowsheet Documentation  Taken 9/2/2024 0200 by Roberto Causey RN  Oral Care:   suction provided   swabbed with antiseptic solution   teeth brushed   tongue brushed  Taken 9/2/2024 0019 by Roberto Causey RN  Oral Care:   suction provided   swabbed with antiseptic solution   teeth brushed   tongue brushed  Taken 9/2/2024 0000 by Roberto Causey RN  Airway Safety Measures: all equipment/monitors on and audible  Airway/Ventilation Management:   airway patency maintained   calming measures promoted   pulmonary hygiene promoted  Oral Care:   suction provided   swabbed with antiseptic solution   teeth brushed   tongue brushed  Taken 9/1/2024 2245 by Roberto Causey RN  Oral Care:   suction provided   teeth brushed   tongue brushed  Taken 9/1/2024 2000 by Roberto Causey RN  Airway Safety Measures: all equipment/monitors on and audible  Airway/Ventilation Management:   airway patency maintained   calming measures promoted   pulmonary hygiene promoted  Oral Care:   suction provided   swabbed with antiseptic solution   teeth brushed   tongue brushed  Goal: Mechanical Ventilation Liberation  Outcome: Progressing  Intervention: Promote Extubation and Mechanical Ventilation Liberation  Recent Flowsheet Documentation  Taken 9/2/2024 0000 by Roberto Causey RN  Medication Review/Management: medications reviewed  Environmental Support: calm environment promoted  Taken 9/1/2024 2000 by Roberto Causey RN  Medication Review/Management: medications reviewed  Environmental Support: calm environment promoted  Goal: Optimal Nutrition Delivery  Outcome: Progressing  Intervention: Optimize Nutrition Delivery  Recent  Flowsheet Documentation  Taken 9/2/2024 0000 by Roberto Causey RN  Nutrition Support Management: weight trending reviewed  Taken 9/1/2024 2000 by Roberto Causey RN  Nutrition Support Management: weight trending reviewed  Goal: Absence of Device-Related Skin and Tissue Injury  Outcome: Progressing  Intervention: Maintain Skin and Tissue Health  Recent Flowsheet Documentation  Taken 9/2/2024 0000 by Roberto Causey RN  Device Skin Pressure Protection:   absorbent pad utilized/changed   adhesive use limited   pressure points protected   positioning supports utilized   skin-to-device areas padded   skin-to-skin areas padded  Taken 9/1/2024 2000 by Roberto Causey RN  Device Skin Pressure Protection:   absorbent pad utilized/changed   adhesive use limited   pressure points protected   positioning supports utilized   skin-to-device areas padded   skin-to-skin areas padded  Goal: Absence of Ventilator-Induced Lung Injury  Outcome: Progressing  Intervention: Facilitate Lung-Protection Measures  Recent Flowsheet Documentation  Taken 9/2/2024 0000 by Roberto Causey RN  Lung Protection Measures: fluid excess minimized  Taken 9/1/2024 2000 by Roberto Causey RN  Lung Protection Measures: fluid excess minimized  Intervention: Prevent Ventilator-Associated Pneumonia  Recent Flowsheet Documentation  Taken 9/2/2024 0200 by Roberto Causey RN  Oral Care:   suction provided   swabbed with antiseptic solution   teeth brushed   tongue brushed  Head of Bed (HOB) Positioning: HOB at 30 degrees  Taken 9/2/2024 0019 by Roberto Causey RN  Oral Care:   suction provided   swabbed with antiseptic solution   teeth brushed   tongue brushed  Head of Bed (HOB) Positioning: HOB at 30 degrees  Taken 9/2/2024 0000 by Roberto Causey RN  Oral Care:   suction provided   swabbed with antiseptic solution   teeth brushed   tongue brushed  Head of Bed (HOB) Positioning: HOB at 30 degrees  Taken 9/1/2024 2245 by Roberto Causey RN  Oral Care:   suction provided   teeth  brushed   tongue brushed  Head of Bed (HOB) Positioning: HOB at 30 degrees  Taken 9/1/2024 2000 by Roberto Causey RN  Oral Care:   suction provided   swabbed with antiseptic solution   teeth brushed   tongue brushed  Head of Bed (HOB) Positioning: HOB at 30 degrees     Problem: Restraint, Nonviolent  Goal: Absence of Harm or Injury  Outcome: Progressing  Intervention: Implement Least Restrictive Safety Strategies  Recent Flowsheet Documentation  Taken 9/2/2024 0000 by Roberto Causey RN  Medical Device Protection: tubing secured  Taken 9/1/2024 2000 by Roberto Causey RN  Medical Device Protection: tubing secured  Intervention: Protect Dignity, Rights and Personal Wellbeing  Recent Flowsheet Documentation  Taken 9/2/2024 0000 by Roberto Causey RN  Trust Relationship/Rapport:   care explained   choices provided   emotional support provided   empathic listening provided   questions answered   questions encouraged   reassurance provided   thoughts/feelings acknowledged  Taken 9/1/2024 2000 by Roberto Causey RN  Trust Relationship/Rapport:   care explained   choices provided   emotional support provided   empathic listening provided   questions answered   questions encouraged   reassurance provided   thoughts/feelings acknowledged  Intervention: Protect Skin and Joint Integrity  Recent Flowsheet Documentation  Taken 9/2/2024 0200 by Roberto Causey RN  Body Position:   turned   left   heels elevated   log-rolled  Taken 9/2/2024 0019 by Roberto Causey RN  Body Position:   turned   right   side-lying  Taken 9/2/2024 0000 by Roberto Causey RN  Body Position:   turned   supine, legs elevated  Skin Protection:   adhesive use limited   incontinence pads utilized   skin to skin areas padded   skin to device areas padded  Taken 9/1/2024 2245 by Roberto Causey RN  Body Position:   right   turned   heels elevated  Taken 9/1/2024 2000 by Roberto Causey RN  Body Position:   turned   supine, legs elevated  Skin Protection:   adhesive use  limited   incontinence pads utilized   skin to skin areas padded   skin to device areas padded

## 2024-09-02 NOTE — PROVIDER NOTIFICATION
MD notified for sustained rhythmic movements. Looks like patient is shrugging their shoulders. Pupils are PERRLA.     Tele Hub did not camera in. Added a one time order for a 2 mg versed bolus from the pump, and instructions to continue going up on the versed gtt.           Also requested an updated RASS goal.     When patient only had propofol for sedation at the start of shift, patient was squeezing hands on command, and performing dorsiflexion when instructed. Patient was also nodding and shaking head in response to questions. At this time, patient met RASS goal.    Later on in the shift, after titration off of prop and onto versed, patient is not meeting RASS goal of 0/-1.          Tele hub notified for increasing gtt to 4 mg/ hr. Patient still making movements, although not as pronounced. RASS of -3/-4. Asked again for updated goal.       Per intensivist, stay at 4 mg/hr on versed, they will address RASS goal.

## 2024-09-02 NOTE — DEATH PRONOUNCEMENT
DEATH PRONOUNCEMENT    This writer was paged to evaluate the patient due to unresponsiveness. The patient has absent pupillary reflexes. Heart tones and breath sounds are absent. There is no response to verbal or painful stimuli. Extremities are cool without appreciable pulses. Time of death is 6:26 AM on 9/2/2024.    Audi Yi DO, MPH  [facility] Hospitalist  [address] [place] [address]  Pager: [phone]  09/02/2024

## 2024-09-02 NOTE — PROVIDER NOTIFICATION
MD notified for large brown, liquid stool output. Rectal tube ordered.     MD notified for NG movement with turn; Tele hub stated since no medications are being administered through the OG tube, and is it only for low-intermittent suction, to simply re advance to the previous elias of 70, and there is NO need for a repeat image to confirm placement.     MD notified for new temp 101.7. New orders for blood cultures obtained.

## 2024-09-03 LAB — BACTERIA BLD CULT: NO GROWTH

## 2024-09-04 ENCOUNTER — TELEPHONE (OUTPATIENT)
Dept: CARDIOLOGY | Facility: CLINIC | Age: 89
End: 2024-09-04
Payer: COMMERCIAL

## 2024-09-04 NOTE — TELEPHONE ENCOUNTER
Daughter had called wanting to know what to do with the remote monitor for Pt's PPM.  Called her and told her that she could just throw it or recycle it at [employer].

## 2024-09-06 NOTE — DISCHARGE SUMMARY
[facility]  Hospitalist Discharge Summary      Date of Admission:  2024  Date of Death:  2024  6:26 AM  Discharging Provider: Eric Rocha MD  Discharge Service: Hospitalist Service    Discharge Diagnoses     Small bowel obstruction.  Postoperative respiratory failure.  Hypovolemic and distributive shock.  Acute kidney injury due to ATN.  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  History of PFO closure and complete heart block.  Hyperlipidemia.        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  This was likely due to adhesions due to hemicolectomy in .  Patient failed Gastrografin challenge and underwent laparotomy with extensive lysis of adhesions on .  Total of 2 hours of lysis was performed and 4 L of malodorous intraluminal contents were evacuated through the NG tube.  Patient was hypotensive during the surgery and was given crystalloid and albumin bolus and transferred to ICU on ventilator and vasopressor support.  After discussion with patient's family, his CODE STATUS was changed to no CPR.    Patient continued to be critically ill after his surgery and remained in profound shock and metabolic acidosis and passed away on the morning of 24.         Consultations This Hospital Stay   SURGERY GENERAL IP CONSULT  PHARMACY TO DOSE WARFARIN  PHARMACY IP CONSULT  PHARMACY IP CONSULT  CARE MANAGEMENT / SOCIAL WORK IP CONSULT    Code Status   Prior    Time Spent on this Encounter   I, Eric Rocha MD, discharged this patient today but I did not personally see the patient today and will not be billing for the patient's discharge.       Eric Rocha MD  [facility]  [facility]  [address] [facility] [address]  Phone: [phone]  Fax: [phone]  ______________________________________________________________________

## 2024-09-07 LAB — BACTERIA BLD CULT: NO GROWTH

## (undated) DEVICE — LINEN POUCH DBL 5427

## (undated) DEVICE — BAG CLEAR TRASH 1.3M 39X33" P4040C

## (undated) DEVICE — SU VICRYL+ 0 27 UR6 VLT VCP603H

## (undated) DEVICE — SU VICRYL 4-0 PS-2 18" UND J496H

## (undated) DEVICE — ESU ELEC BLADE 2.75" COATED/INSULATED E1455

## (undated) DEVICE — GOWN IMPERVIOUS ZONED XLG 9041

## (undated) DEVICE — SHEATH PRELUDE SNAP 13CM 6FR

## (undated) DEVICE — ESU CORD MONOPOLAR 10'  E0510

## (undated) DEVICE — PACK PCMKR PERM SRG PROC LF SAN32PC573

## (undated) DEVICE — SU VICRYL 3-0 SH CR 8X18" J774

## (undated) DEVICE — DEFIB PRO-PADZ LVP LQD GEL ADULT 8900-2105-01

## (undated) DEVICE — SUCTION TIP YANKAUER W/O VENT K86

## (undated) DEVICE — SOL NACL 0.9% INJ 1000ML BAG 2B1324X

## (undated) DEVICE — DRSG ABDOMINAL 07 1/2X8" 7197D

## (undated) DEVICE — LINEN TOWEL PACK X5 5464

## (undated) DEVICE — ENDO TROCAR BLUNT TIP KII BALLOON 12X100MM C0R47

## (undated) DEVICE — SUCTION TIP POOLE K770

## (undated) DEVICE — GLOVE BIOGEL PI MICRO SZ 6.5 48565

## (undated) DEVICE — ESU GROUND PAD ADULT W/CORD E7507

## (undated) DEVICE — STPL SKIN 35W 6.9MM  PXW35

## (undated) DEVICE — SUCTION CANISTER MEDIVAC LINER 3000ML W/LID 65651-530

## (undated) DEVICE — SU PDS II 0 CTX 60" Z990G

## (undated) DEVICE — GLOVE BIOGEL PI MICRO INDICATOR UNDERGLOVE SZ 6.5 48965

## (undated) DEVICE — SPONGE LAP 18X18" X8435

## (undated) DEVICE — Device

## (undated) DEVICE — ENDO TROCAR FIRST ENTRY KII FIOS Z-THRD 05X100MM CTF03

## (undated) DEVICE — SURGICEL HEMOSTAT 2X14" 1951

## (undated) DEVICE — LINEN HALF SHEET 5512

## (undated) DEVICE — CATH TRAY FOLEY COUDE SURESTEP 16FR W/DRN BAG LATEX A304416A

## (undated) DEVICE — RAD INTRODUCER KIT MICRO 5FRX10CM .018 NITINOL G/W

## (undated) DEVICE — CABLE PACING ALLIGATOR CLIP 301-CG

## (undated) DEVICE — ESU PENCIL W/COATED BLADE E2450H

## (undated) RX ORDER — CEFAZOLIN SODIUM 2 G/100ML
INJECTION, SOLUTION INTRAVENOUS
Status: DISPENSED
Start: 2023-01-01

## (undated) RX ORDER — BUPIVACAINE HYDROCHLORIDE 5 MG/ML
INJECTION, SOLUTION EPIDURAL; INTRACAUDAL
Status: DISPENSED
Start: 2023-01-01

## (undated) RX ORDER — FENTANYL CITRATE 50 UG/ML
INJECTION, SOLUTION INTRAMUSCULAR; INTRAVENOUS
Status: DISPENSED
Start: 2024-01-01

## (undated) RX ORDER — BUPIVACAINE HYDROCHLORIDE 5 MG/ML
INJECTION, SOLUTION EPIDURAL; INTRACAUDAL
Status: DISPENSED
Start: 2024-01-01

## (undated) RX ORDER — LIDOCAINE HYDROCHLORIDE 10 MG/ML
INJECTION, SOLUTION EPIDURAL; INFILTRATION; INTRACAUDAL; PERINEURAL
Status: DISPENSED
Start: 2023-01-01

## (undated) RX ORDER — FENTANYL CITRATE 50 UG/ML
INJECTION, SOLUTION INTRAMUSCULAR; INTRAVENOUS
Status: DISPENSED
Start: 2023-01-01

## (undated) RX ORDER — CEFAZOLIN SODIUM/WATER 2 G/20 ML
SYRINGE (ML) INTRAVENOUS
Status: DISPENSED
Start: 2024-01-01